# Patient Record
Sex: FEMALE | Race: WHITE | NOT HISPANIC OR LATINO | Employment: OTHER | URBAN - NONMETROPOLITAN AREA
[De-identification: names, ages, dates, MRNs, and addresses within clinical notes are randomized per-mention and may not be internally consistent; named-entity substitution may affect disease eponyms.]

---

## 2017-04-09 ENCOUNTER — APPOINTMENT (OUTPATIENT)
Dept: GENERAL RADIOLOGY | Facility: HOSPITAL | Age: 47
End: 2017-04-09

## 2017-04-09 ENCOUNTER — HOSPITAL ENCOUNTER (EMERGENCY)
Facility: HOSPITAL | Age: 47
Discharge: HOME | End: 2017-04-09
Attending: EMERGENCY MEDICINE | Admitting: EMERGENCY MEDICINE

## 2017-04-09 VITALS
BODY MASS INDEX: 39.08 KG/M2 | SYSTOLIC BLOOD PRESSURE: 124 MMHG | DIASTOLIC BLOOD PRESSURE: 83 MMHG | RESPIRATION RATE: 16 BRPM | HEART RATE: 83 BPM | WEIGHT: 249 LBS | OXYGEN SATURATION: 99 % | HEIGHT: 67 IN | TEMPERATURE: 97.9 F

## 2017-04-09 DIAGNOSIS — S93.402A SPRAIN OF LEFT ANKLE, UNSPECIFIED LIGAMENT, INITIAL ENCOUNTER: Primary | ICD-10-CM

## 2017-04-09 PROCEDURE — 73610 X-RAY EXAM OF ANKLE: CPT

## 2017-04-09 PROCEDURE — 73610 X-RAY EXAM OF ANKLE: CPT | Performed by: RADIOLOGY

## 2017-04-09 PROCEDURE — 25010000002 KETOROLAC TROMETHAMINE PER 15 MG: Performed by: PHYSICIAN ASSISTANT

## 2017-04-09 PROCEDURE — 99283 EMERGENCY DEPT VISIT LOW MDM: CPT

## 2017-04-09 PROCEDURE — 96372 THER/PROPH/DIAG INJ SC/IM: CPT

## 2017-04-09 RX ORDER — IBUPROFEN 600 MG/1
600 TABLET ORAL EVERY 8 HOURS PRN
Qty: 20 TABLET | Refills: 0 | Status: SHIPPED | OUTPATIENT
Start: 2017-04-09 | End: 2019-03-15

## 2017-04-09 RX ORDER — KETOROLAC TROMETHAMINE 30 MG/ML
60 INJECTION, SOLUTION INTRAMUSCULAR; INTRAVENOUS ONCE
Status: COMPLETED | OUTPATIENT
Start: 2017-04-09 | End: 2017-04-09

## 2017-04-09 RX ADMIN — KETOROLAC TROMETHAMINE 60 MG: 60 INJECTION, SOLUTION INTRAMUSCULAR at 16:07

## 2017-04-09 NOTE — ED PROVIDER NOTES
Subjective   Patient is a 46 y.o. female presenting with lower extremity pain.   History provided by:  Patient  Lower Extremity Issue   Location:  Ankle and foot  Time since incident:  30 minutes  Injury: yes    Mechanism of injury: fall    Fall:     Fall occurred:  Down stairs  Ankle location:  L ankle  Foot location:  L foot and sole of L foot  Pain details:     Quality:  Aching and throbbing    Radiates to:  Does not radiate    Severity:  Moderate    Onset quality:  Sudden    Duration:  1 hour    Timing:  Constant    Progression:  Worsening  Chronicity:  New  Dislocation: no    Foreign body present:  No foreign bodies  Prior injury to area:  Yes  Relieved by:  Nothing  Ineffective treatments:  None tried  Associated symptoms: decreased ROM and swelling    Associated symptoms: no fever    Risk factors: obesity        Review of Systems   Constitutional: Negative.  Negative for fever.   HENT: Negative.    Respiratory: Negative.    Cardiovascular: Negative.  Negative for chest pain.   Gastrointestinal: Negative.  Negative for abdominal pain.   Endocrine: Negative.    Genitourinary: Negative.  Negative for dysuria.   Skin: Negative.    Neurological: Negative.    Psychiatric/Behavioral: Negative.    All other systems reviewed and are negative.      History reviewed. No pertinent past medical history.    Allergies   Allergen Reactions   • Penicillins        Past Surgical History:   Procedure Laterality Date   • BLADDER SURGERY     •  SECTION     • CHOLECYSTECTOMY     • TUBAL ABDOMINAL LIGATION         History reviewed. No pertinent family history.    Social History     Social History   • Marital status:      Spouse name: N/A   • Number of children: N/A   • Years of education: N/A     Social History Main Topics   • Smoking status: Current Every Day Smoker     Packs/day: 0.50     Types: Cigarettes   • Smokeless tobacco: None   • Alcohol use None   • Drug use: None   • Sexual activity: Not Asked     Other  Topics Concern   • None     Social History Narrative   • None           Objective   Physical Exam   Constitutional: She is oriented to person, place, and time. She appears well-developed and well-nourished. No distress.   HENT:   Head: Normocephalic and atraumatic.   Right Ear: External ear normal.   Left Ear: External ear normal.   Nose: Nose normal.   Eyes: Conjunctivae and EOM are normal. Pupils are equal, round, and reactive to light.   Neck: Normal range of motion. Neck supple. No JVD present. No tracheal deviation present.   Cardiovascular: Normal rate, regular rhythm and normal heart sounds.    No murmur heard.  Pulmonary/Chest: Effort normal and breath sounds normal. No respiratory distress. She has no wheezes.   Abdominal: Soft. Bowel sounds are normal. There is no tenderness.   Musculoskeletal: She exhibits edema and tenderness. She exhibits no deformity.   Decreased ROM, swelling, and tenderness noted to L. Ankle.  Pain noted to planter side of left foot.    Neurological: She is alert and oriented to person, place, and time. No cranial nerve deficit.   Skin: Skin is warm and dry. No rash noted. She is not diaphoretic. No erythema. No pallor.   Psychiatric: She has a normal mood and affect. Her behavior is normal. Thought content normal.   Nursing note and vitals reviewed.      Procedures         ED Course  ED Course   Comment By Time   XR reviewed by Dr. Alberts:  Negative for acute bony abnormality. KATHERINE Daugherty 04/09 1700                  MDM  Number of Diagnoses or Management Options  Sprain of left ankle, unspecified ligament, initial encounter: minor     Amount and/or Complexity of Data Reviewed  Tests in the radiology section of CPT®: ordered and reviewed    Risk of Complications, Morbidity, and/or Mortality  Presenting problems: low  Diagnostic procedures: low  Management options: low    Patient Progress  Patient progress: stable      Final diagnoses:   Sprain of left ankle, unspecified  ligament, initial encounter            KATHERINE Daugherty  04/09/17 6916

## 2017-04-26 ENCOUNTER — OFFICE VISIT (OUTPATIENT)
Dept: ORTHOPEDIC SURGERY | Facility: CLINIC | Age: 47
End: 2017-04-26

## 2017-04-26 VITALS — HEIGHT: 67 IN | BODY MASS INDEX: 39.24 KG/M2 | WEIGHT: 250 LBS

## 2017-04-26 DIAGNOSIS — S93.402A SPRAIN OF LEFT ANKLE, UNSPECIFIED LIGAMENT, INITIAL ENCOUNTER: Primary | ICD-10-CM

## 2017-04-26 PROBLEM — M25.572 ACUTE LEFT ANKLE PAIN: Status: ACTIVE | Noted: 2017-04-26

## 2017-04-26 PROBLEM — R15.2 DEFECATION URGENCY: Status: ACTIVE | Noted: 2017-04-26

## 2017-04-26 PROCEDURE — 99203 OFFICE O/P NEW LOW 30 MIN: CPT | Performed by: PHYSICIAN ASSISTANT

## 2017-04-26 RX ORDER — FLUOXETINE 10 MG/1
CAPSULE ORAL
COMMUNITY
Start: 2017-04-20 | End: 2019-08-06

## 2017-04-26 RX ORDER — ERGOCALCIFEROL 1.25 MG/1
CAPSULE ORAL
COMMUNITY
Start: 2017-04-20 | End: 2019-03-15

## 2017-04-26 NOTE — PROGRESS NOTES
New Patient Visit        Patient: Briseyda Edouard  YOB: 1970  Date of encounter: 2017      History of Present Illness:   Briseyda Edouard is a 46 y.o. female who is referred here today by Celine MURPHY for evaluation of left ankle pain.  She states on 2017 she was coming down the steps of her porch when she stepped into a hole.  She states when she did that she rolled her ankle and heard a loud pop.  She states she had pain and swelling in the ankle and extended up into the leg.  She states she had increased pain with weightbearing and was unable to bear any weight on that for the first several weeks.  She still continued to have pain with weightbearing and walks with a limp because of this.  She's been icing this, elevating and using ibuprofen with minimal improvement.  She's localizes the majority of the pain along both the lateral and medial aspects of her ankle.    PMH:   Patient Active Problem List   Diagnosis   • Defecation urgency   • Vitamin D deficiency   • Anxiety and depression   • Acute left ankle pain     Past Medical History:   Diagnosis Date   • Anxiety and depression    • Vitamin D deficiency        PSH:  Past Surgical History:   Procedure Laterality Date   • BLADDER SURGERY     •  SECTION     • CHOLECYSTECTOMY     • TONSILLECTOMY AND ADENOIDECTOMY     • TUBAL ABDOMINAL LIGATION         Allergies:     Allergies   Allergen Reactions   • Penicillins        Medications:     Current Outpatient Prescriptions:   •  FLUoxetine (PROzac) 10 MG capsule, , Disp: , Rfl:   •  ibuprofen (ADVIL,MOTRIN) 600 MG tablet, Take 1 tablet by mouth Every 8 (Eight) Hours As Needed for Mild Pain (1-3)., Disp: 20 tablet, Rfl: 0  •  vitamin D (ERGOCALCIFEROL) 96683 UNITS capsule capsule, , Disp: , Rfl:     Social History:  Social History     Social History   • Marital status:      Spouse name: N/A   • Number of children: N/A   • Years of education: N/A     Occupational History   • Not on  "file.     Social History Main Topics   • Smoking status: Current Every Day Smoker     Packs/day: 0.50     Years: 30.00     Types: Cigarettes   • Smokeless tobacco: Never Used   • Alcohol use No   • Drug use: No   • Sexual activity: Not on file     Other Topics Concern   • Not on file     Social History Narrative       Family History:     Family History   Problem Relation Age of Onset   • Cancer Mother    • Diabetes Father        Review of Systems:   Review of Systems   Constitutional: Negative.    HENT: Negative.    Eyes: Negative.    Respiratory: Negative.    Cardiovascular: Negative.    Gastrointestinal: Negative.    Genitourinary: Positive for frequency and hematuria.   Musculoskeletal:        Pertinent positives in HPI   Neurological: Negative.    Hematological: Negative.    Psychiatric/Behavioral: Positive for dysphoric mood.       Physical Exam: 46 y.o. female  General Appearance:    Alert and oriented x 3, cooperative, in no acute distress                   Vitals:    04/26/17 0822   Weight: 250 lb (113 kg)   Height: 67\" (170.2 cm)                Musculoskeletal:Examination of the left ankle reveals moderate swelling with ecchymosis.  She has tenderness laterally along the lateral ligament complex and medially along the posterior tibial tendon.  She has good motion.  There is no gross instability.  Her neurovascular status is intact.    Radiology:     3 views of the left ankle were reviewed revealing no acute fractures or dislocations.    Assessment    ICD-10-CM ICD-9-CM   1. Sprain of left ankle, unspecified ligament, initial encounter S93.402A 845.00       Plan:  A 46-year-old female with acute injury sustained about 3 weeks ago.  X-rays of the ankle were reviewed revealing no acute fractures.  She has a grade 2-3 ankle sprain.  We will treat conservatively.  Today a immobilizer and provided her with an equalizer boot.  She is to wear the boot with all weightbearing activities.  She can remove it at night " and for bathing purposes.  She'll return back in 4 weeks for reevaluation.  X-rays are not necessary at her follow-up    Dasha MURPHY

## 2017-05-03 ENCOUNTER — OFFICE VISIT (OUTPATIENT)
Dept: UROLOGY | Facility: CLINIC | Age: 47
End: 2017-05-03

## 2017-05-03 VITALS
HEIGHT: 67 IN | WEIGHT: 250 LBS | DIASTOLIC BLOOD PRESSURE: 83 MMHG | SYSTOLIC BLOOD PRESSURE: 130 MMHG | BODY MASS INDEX: 39.24 KG/M2 | HEART RATE: 75 BPM

## 2017-05-03 DIAGNOSIS — R31.0 GROSS HEMATURIA: ICD-10-CM

## 2017-05-03 DIAGNOSIS — N39.0 URINARY TRACT INFECTION, SITE UNSPECIFIED: Primary | ICD-10-CM

## 2017-05-03 LAB
ANION GAP SERPL CALCULATED.3IONS-SCNC: 3 MMOL/L (ref 3.6–11.2)
BILIRUB BLD-MCNC: NEGATIVE MG/DL
BUN BLD-MCNC: 7 MG/DL (ref 7–21)
BUN/CREAT SERPL: 8.2 (ref 7–25)
CALCIUM SPEC-SCNC: 9.5 MG/DL (ref 7.7–10)
CHLORIDE SERPL-SCNC: 113 MMOL/L (ref 99–112)
CLARITY, POC: CLEAR
CO2 SERPL-SCNC: 26 MMOL/L (ref 24.3–31.9)
COLOR UR: YELLOW
CREAT BLD-MCNC: 0.85 MG/DL (ref 0.43–1.29)
GFR SERPL CREATININE-BSD FRML MDRD: 73 ML/MIN/1.73
GLUCOSE BLD-MCNC: 76 MG/DL (ref 70–110)
GLUCOSE UR STRIP-MCNC: NEGATIVE MG/DL
KETONES UR QL: NEGATIVE
LEUKOCYTE EST, POC: NEGATIVE
NITRITE UR-MCNC: NEGATIVE MG/ML
OSMOLALITY SERPL CALC.SUM OF ELEC: 279.8 MOSM/KG (ref 273–305)
PH UR: 5.5 [PH] (ref 5–8)
POTASSIUM BLD-SCNC: 3.8 MMOL/L (ref 3.5–5.3)
PROT UR STRIP-MCNC: NEGATIVE MG/DL
RBC # UR STRIP: ABNORMAL /UL
SODIUM BLD-SCNC: 142 MMOL/L (ref 135–153)
SP GR UR: 1.02 (ref 1–1.03)
UROBILINOGEN UR QL: NORMAL

## 2017-05-03 PROCEDURE — 99214 OFFICE O/P EST MOD 30 MIN: CPT | Performed by: NURSE PRACTITIONER

## 2017-05-03 PROCEDURE — 81003 URINALYSIS AUTO W/O SCOPE: CPT | Performed by: NURSE PRACTITIONER

## 2017-05-03 PROCEDURE — 80048 BASIC METABOLIC PNL TOTAL CA: CPT | Performed by: NURSE PRACTITIONER

## 2017-05-25 ENCOUNTER — OFFICE VISIT (OUTPATIENT)
Dept: ORTHOPEDIC SURGERY | Facility: CLINIC | Age: 47
End: 2017-05-25

## 2017-05-25 VITALS — BODY MASS INDEX: 39.24 KG/M2 | HEIGHT: 67 IN | WEIGHT: 250 LBS

## 2017-05-25 DIAGNOSIS — S93.402D SPRAIN OF LEFT ANKLE, UNSPECIFIED LIGAMENT, SUBSEQUENT ENCOUNTER: Primary | ICD-10-CM

## 2017-05-25 PROCEDURE — 99212 OFFICE O/P EST SF 10 MIN: CPT | Performed by: PHYSICIAN ASSISTANT

## 2017-06-13 ENCOUNTER — HOSPITAL ENCOUNTER (OUTPATIENT)
Dept: CT IMAGING | Facility: HOSPITAL | Age: 47
Discharge: HOME | End: 2017-06-13
Admitting: NURSE PRACTITIONER

## 2017-06-13 ENCOUNTER — PROCEDURE VISIT (OUTPATIENT)
Dept: UROLOGY | Facility: CLINIC | Age: 47
End: 2017-06-13

## 2017-06-13 VITALS — BODY MASS INDEX: 39.08 KG/M2 | HEIGHT: 67 IN | WEIGHT: 249 LBS

## 2017-06-13 DIAGNOSIS — R31.0 GROSS HEMATURIA: Primary | ICD-10-CM

## 2017-06-13 DIAGNOSIS — N39.0 URINARY TRACT INFECTION, SITE UNSPECIFIED: ICD-10-CM

## 2017-06-13 DIAGNOSIS — R31.0 GROSS HEMATURIA: ICD-10-CM

## 2017-06-13 LAB
BILIRUB BLD-MCNC: NEGATIVE MG/DL
CLARITY, POC: CLEAR
COLOR UR: YELLOW
GLUCOSE UR STRIP-MCNC: NEGATIVE MG/DL
KETONES UR QL: NEGATIVE
LEUKOCYTE EST, POC: NEGATIVE
NITRITE UR-MCNC: NEGATIVE MG/ML
PH UR: 6 [PH] (ref 5–8)
PROT UR STRIP-MCNC: NEGATIVE MG/DL
RBC # UR STRIP: ABNORMAL /UL
SP GR UR: 1.01 (ref 1–1.03)
UROBILINOGEN UR QL: NORMAL

## 2017-06-13 PROCEDURE — 0 IOPAMIDOL 61 % SOLUTION: Performed by: NURSE PRACTITIONER

## 2017-06-13 PROCEDURE — 52000 CYSTOURETHROSCOPY: CPT | Performed by: UROLOGY

## 2017-06-13 PROCEDURE — 81003 URINALYSIS AUTO W/O SCOPE: CPT | Performed by: UROLOGY

## 2017-06-13 PROCEDURE — 74178 CT ABD&PLV WO CNTR FLWD CNTR: CPT

## 2017-06-13 PROCEDURE — 74178 CT ABD&PLV WO CNTR FLWD CNTR: CPT | Performed by: RADIOLOGY

## 2017-06-13 RX ADMIN — IOPAMIDOL 100 ML: 612 INJECTION, SOLUTION INTRAVENOUS at 08:19

## 2017-06-13 NOTE — PROGRESS NOTES
Chief Complaint:       Chief Complaint   Patient presents with   • Cystoscopy     For episodes of gross hematuria and left flank pain           HPI:     Pt has had intermittent hematuria 2 months ago.  Pt had a ct scan that did not show any  pathology.   She did have bilateral ovarian cysts and a heterogeneous uterus.  HPI        PMI:      Past Medical History:   Diagnosis Date   • Anxiety and depression    • Cancer     MELANOMA   • Vitamin D deficiency            Medications:        Current Outpatient Prescriptions:   •  Cyanocobalamin (VITAMIN DEFICIENCY SYSTEM-B12) 1000 MCG/ML kit, Inject  as directed., Disp: , Rfl:   •  FLUoxetine (PROzac) 10 MG capsule, , Disp: , Rfl:   •  ibuprofen (ADVIL,MOTRIN) 600 MG tablet, Take 1 tablet by mouth Every 8 (Eight) Hours As Needed for Mild Pain (1-3)., Disp: 20 tablet, Rfl: 0  •  vitamin D (ERGOCALCIFEROL) 87710 UNITS capsule capsule, , Disp: , Rfl:   No current facility-administered medications for this visit.         Allergies:      Allergies   Allergen Reactions   • Penicillins Anaphylaxis           Past Surgical Histroy:      Past Surgical History:   Procedure Laterality Date   • BLADDER SURGERY     •  SECTION     • CHOLECYSTECTOMY     • TONSILLECTOMY AND ADENOIDECTOMY     • TUBAL ABDOMINAL LIGATION             Social History:      Social History     Social History   • Marital status:      Spouse name: N/A   • Number of children: N/A   • Years of education: N/A     Occupational History   • Not on file.     Social History Main Topics   • Smoking status: Current Every Day Smoker     Packs/day: 0.50     Years: 30.00     Types: Cigarettes   • Smokeless tobacco: Never Used   • Alcohol use No   • Drug use: No   • Sexual activity: Not on file     Other Topics Concern   • Not on file     Social History Narrative           Family History:      Family History   Problem Relation Age of Onset   • Cancer Mother      renal cell carconma   • Diabetes Father               Physical Exam:      Physical Exam        Procedure:      Procedure: Cystoscopy Female    Indication: hematuria    Urinalysis Performed Today:  Negative for Infection    Premedication:none    Informed Consent Obtained    Sterile prep performed in usual fashion    6 cc of topical lidocaine inserted urethrally    Flexible cystoscope inserted and bladder examined    Findings: normal: Urethra without lesions, Bladder mucosa without tumors or lesions, No stones seen, ureteral orifices are in orthotopic position and size.    Additional Procedure with Cystoscopy: none    Discussion:      Pt should follow up with her gynecologist regarding the GYN findings of the ct scan and she can return to see us in 1 year and check ua at that time.    Counseling was given to patient for the following topics diagnostic results. and the interim medical history and current results were reviewed.  A treatment plan with follow-up was made. Total time of the encounter was 11 minutes and 11 minutes were spent discussing Gross hematuria [R31.0] face-to-face.      This document has been electronically signed by Bud Nicholas MD June 13, 2017 10:39 AM

## 2017-07-06 ENCOUNTER — OFFICE VISIT (OUTPATIENT)
Dept: ORTHOPEDIC SURGERY | Facility: CLINIC | Age: 47
End: 2017-07-06

## 2017-07-06 VITALS — WEIGHT: 249 LBS | BODY MASS INDEX: 39.08 KG/M2 | HEIGHT: 67 IN

## 2017-07-06 DIAGNOSIS — S93.402D SPRAIN OF LEFT ANKLE, UNSPECIFIED LIGAMENT, SUBSEQUENT ENCOUNTER: Primary | ICD-10-CM

## 2017-07-06 PROCEDURE — 99212 OFFICE O/P EST SF 10 MIN: CPT | Performed by: PHYSICIAN ASSISTANT

## 2017-07-06 NOTE — PROGRESS NOTES
Briseyda Edouard   :1970    Date of encounter:2017        HPI:  Briseyda Edouard is a 46 y.o. female who returns here today for follow-up of a grade 3 left ankle sprain.  This now been approximately 3 months since her initial injury.  She states she no longer has to wear the ankle lace up.  She states pain is continuing to improve.  She states she still has some mild swelling at the end of the day but overall is doing well without significant complaints.      Exam:   Examination of the left ankle reveals no significant swelling or ecchymosis.  She has mild tenderness along the lateral ligament complex.  She has full range of motion without instability.  Her neurovascular status is intact.    Assessment    ICD-10-CM ICD-9-CM   1. Sprain of left ankle, unspecified ligament, subsequent encounter S93.402D 845.00       Plan:   A 46-year-old female with a grade 3 left ankle sprain.  Today she is doing well without further complaints.  She has no instability on examination.  She is released to all activities as tolerated and will return back on an as-needed basis with any further difficulties.    Dasha Alcantar PAC    Cc Celine MURPHY

## 2018-08-07 ENCOUNTER — OFFICE VISIT (OUTPATIENT)
Dept: UROLOGY | Facility: CLINIC | Age: 48
End: 2018-08-07

## 2018-08-07 VITALS — WEIGHT: 249 LBS | BODY MASS INDEX: 39.08 KG/M2 | HEIGHT: 67 IN

## 2018-08-07 DIAGNOSIS — R31.29 MICROHEMATURIA: Primary | ICD-10-CM

## 2018-08-07 LAB
BILIRUB BLD-MCNC: NEGATIVE MG/DL
CLARITY, POC: CLEAR
COLOR UR: YELLOW
GLUCOSE UR STRIP-MCNC: NEGATIVE MG/DL
KETONES UR QL: NEGATIVE
LEUKOCYTE EST, POC: NEGATIVE
NITRITE UR-MCNC: NEGATIVE MG/ML
PH UR: 5.5 [PH] (ref 5–8)
PROT UR STRIP-MCNC: NEGATIVE MG/DL
RBC # UR STRIP: ABNORMAL /UL
SP GR UR: 1.02 (ref 1–1.03)
UROBILINOGEN UR QL: NORMAL

## 2018-08-07 PROCEDURE — 99214 OFFICE O/P EST MOD 30 MIN: CPT | Performed by: UROLOGY

## 2018-08-07 PROCEDURE — 99406 BEHAV CHNG SMOKING 3-10 MIN: CPT | Performed by: UROLOGY

## 2018-08-07 NOTE — PROGRESS NOTES
Chief Complaint:          Chief Complaint   Patient presents with   • Urinary Tract Infection       HPI:   47 y.o. female.  47-year-old white female previously followed with Lexi Washburn always has visible blood had 3 urinary tract infections last year.  Mother is in hospice secondary to renal cell carcinoma she had the diagnosis in the 70's she never had chemotherapy she has a known large hiatal hernia.  She had a negative cystoscopy in 2017 negative CT and they have 2017 she's had mesh placed and mesh removed.  She has a InterStim stimulator this never worked in causing significant dysfunction she has severe urinary incontinence.  She is a  3 para 3 her urine today was 3+ positive for blood I'm going to get a renal ultrasound and follow-up with her based on this she'll need continued follow-up because the gross hematuria we also discussed removing her stimulator unfortunately this is not something I can do here because I have no certification I offered to refer her to the [facility]    Past Medical History:        Past Medical History:   Diagnosis Date   • Anxiety and depression    • Cancer (CMS/HCC)     MELANOMA   • Vitamin D deficiency          Current Meds:     Current Outpatient Prescriptions   Medication Sig Dispense Refill   • Cyanocobalamin (VITAMIN DEFICIENCY SYSTEM-B12) 1000 MCG/ML kit Inject  as directed.     • FLUoxetine (PROzac) 10 MG capsule      • ibuprofen (ADVIL,MOTRIN) 600 MG tablet Take 1 tablet by mouth Every 8 (Eight) Hours As Needed for Mild Pain (1-3). 20 tablet 0   • vitamin D (ERGOCALCIFEROL) 18036 UNITS capsule capsule        No current facility-administered medications for this visit.         Allergies:      Allergies   Allergen Reactions   • Penicillins Anaphylaxis        Past Surgical History:     Past Surgical History:   Procedure Laterality Date   • BLADDER SURGERY     •  SECTION     • CHOLECYSTECTOMY     • TONSILLECTOMY AND ADENOIDECTOMY     • TUBAL  ABDOMINAL LIGATION           Social History:     Social History     Social History   • Marital status:      Spouse name: N/A   • Number of children: N/A   • Years of education: N/A     Occupational History   • Not on file.     Social History Main Topics   • Smoking status: Current Every Day Smoker     Packs/day: 0.50     Years: 30.00     Types: Cigarettes   • Smokeless tobacco: Never Used   • Alcohol use No   • Drug use: No   • Sexual activity: Not on file     Other Topics Concern   • Not on file     Social History Narrative   • No narrative on file       Family History:     Family History   Problem Relation Age of Onset   • Cancer Mother         renal cell carconma   • Diabetes Father        Review of Systems:     Review of Systems   Constitutional: Negative.  Negative for activity change, appetite change, chills, diaphoresis, fatigue and unexpected weight change.   HENT: Negative for congestion, dental problem, drooling, ear discharge, ear pain, facial swelling, hearing loss, mouth sores, nosebleeds, postnasal drip, rhinorrhea, sinus pressure, sneezing, sore throat, tinnitus, trouble swallowing and voice change.    Eyes: Negative.  Negative for photophobia, pain, discharge, redness, itching and visual disturbance.   Respiratory: Negative.  Negative for apnea, cough, choking, chest tightness, shortness of breath, wheezing and stridor.    Cardiovascular: Negative.  Negative for chest pain, palpitations and leg swelling.   Gastrointestinal: Negative.  Negative for abdominal distention, abdominal pain, anal bleeding, blood in stool, constipation, diarrhea, nausea, rectal pain and vomiting.   Endocrine: Negative.  Negative for cold intolerance, heat intolerance, polydipsia, polyphagia and polyuria.   Genitourinary: Positive for hematuria.   Musculoskeletal: Negative.  Negative for arthralgias, back pain, gait problem, joint swelling, myalgias, neck pain and neck stiffness.   Skin: Negative.  Negative for color  change, pallor, rash and wound.   Allergic/Immunologic: Negative.  Negative for environmental allergies, food allergies and immunocompromised state.   Neurological: Negative.  Negative for dizziness, tremors, seizures, syncope, facial asymmetry, speech difficulty, weakness, light-headedness, numbness and headaches.   Hematological: Negative.  Negative for adenopathy. Does not bruise/bleed easily.   Psychiatric/Behavioral: Negative for agitation, behavioral problems, confusion, decreased concentration, dysphoric mood, hallucinations, self-injury, sleep disturbance and suicidal ideas. The patient is not nervous/anxious and is not hyperactive.    All other systems reviewed and are negative.      Physical Exam:     Physical Exam   Constitutional: She appears well-developed and well-nourished.   HENT:   Head: Normocephalic and atraumatic.   Right Ear: External ear normal.   Left Ear: External ear normal.   Mouth/Throat: Oropharynx is clear and moist.   Eyes: Pupils are equal, round, and reactive to light. Conjunctivae are normal.   Cardiovascular: Normal rate, regular rhythm, normal heart sounds and intact distal pulses.    Pulmonary/Chest: Effort normal and breath sounds normal.   Abdominal: Soft. Bowel sounds are normal. She exhibits no distension and no mass. There is no tenderness. There is no rebound and no guarding.   Genitourinary: No vaginal discharge found.   Genitourinary Comments: Right buttocks InterStim causing significant discomfort   Musculoskeletal: Normal range of motion.   Neurological: She is alert. She has normal reflexes.   Skin: Skin is warm and dry.   Psychiatric: She has a normal mood and affect. Her behavior is normal. Judgment and thought content normal.       I have reviewed the following portions of the patient's history: allergies, current medications, past family history, past medical history, past social history, past surgical history, problem list and ROS and confirm it's  accurate.      Procedure:       Assessment/Plan:   Hematuria-patient was diagnosed with hematuria.  We discussed the significance of microscopic hematuria versus gross hematuria.  We discussed the presence or absence of the type of clotting identified including vermiform clots consistent with ureteral bleeding versus just pink tinged urine versus shannan clots.  We discussed the presence of urokinase in the urine which causes the clots to dissolve with time.  We discussed the fact that it takes only a very small amount of blood in the urine to make the urine very red appearing and therefore give one the impression that there is much more blood loss that is really present.  He discussed the use of both an upper and lower tract investigation.  I discussed the fact that an upper tract investigation includes a normal renal ultrasound with a significant risks of missing more subtle lesions.  Progressing to a CT scan without contrast and finally the CT scan with contrast being the gold standard to diagnose the small neoplasms.  We discussed the lower tract investigation consisting of a cystoscopy in many of the cases where the upper tract study is negative.  Also discussed the fact that if there is a contraindication to the use of contrast we would do a noncontrasted study and this also has a chance of missing small lesions.  The specific instance would be cases of diabetes and chronic renal insufficiency.  Discussed the fact that there is about a 96% chance of a negative workup with episodes of microscopic hematuria and with much greater in the face of gross hematuria.  We discussed the fact that this is a non-cumulative test.  In other words if there is hematuria next year I would recommend continuing to work up the condition because of the fact that neoplasms may be small at the first workup and easily are missed.  I discussed the differential diagnosis of hematuria including trauma, neoplasia, infection, etc.  We  discussed the fact that if there is any history of chronic kidney disease or risk factors such as diabetes for contrast a noncontrasted study will be utilized.  We will initiate an investigation.  She previously had a CT and cystoscopy this year we'll do an ultrasound  Urinary incontinence:  Patient was diagnosed with urinary incontinence.  We discussed treatable and non-treatable causes of both stress and urge urinary incontinence.  With regards to stress urinary incontinence we discussed its relationship to childbirth and pelvic health.  We discussed the grading of stress incontinence with trying to quantitate the number of pads used.  We talked about leaking urine with laughing, lifting, coughing, and sexual intercourse.  Talked about the urge component and the concept of mixed incontinence where upon the stress treatable at the urge may exist and that 50% of the time the urge will resolve with treatment of the stress incontinence.  We talked with the diagnostic workup including a postvoid residual urine, urine and even a simple cystometrogram.  I discussed the findings that may be neurologically related including commonly seen with multiple sclerosis, Parkinson's disease, and stroke.  I talked about the various therapeutic options including anticholinergics, beta 3 agonists, and alpha blockade if there is a component of obstruction.  I discussed the side effects of anti-cholinergic including dry mouth, double vision etc.     Patient's Body mass index is 38.99 kg/m². BMI is above normal parameters. Recommendations include: educational material.    I advised Briseyda of the risks of continuing to use tobacco, and I provided her with tobacco cessation educational materials in the After Visit Summary.     During this visit, I spent 3-10 minutes counseling the patient regarding tobacco cessation.        This document has been electronically signed by ANKUSH LEVY MD August 7, 2018 8:58 AM

## 2018-08-09 PROBLEM — R31.29 MICROHEMATURIA: Status: ACTIVE | Noted: 2018-08-09

## 2018-08-10 ENCOUNTER — HOSPITAL ENCOUNTER (OUTPATIENT)
Dept: ULTRASOUND IMAGING | Facility: HOSPITAL | Age: 48
Discharge: HOME | End: 2018-08-10
Attending: UROLOGY | Admitting: UROLOGY

## 2018-08-10 DIAGNOSIS — R31.29 MICROHEMATURIA: ICD-10-CM

## 2018-08-10 PROCEDURE — 76775 US EXAM ABDO BACK WALL LIM: CPT

## 2018-08-10 PROCEDURE — 76775 US EXAM ABDO BACK WALL LIM: CPT | Performed by: RADIOLOGY

## 2018-09-28 ENCOUNTER — TRANSCRIBE ORDERS (OUTPATIENT)
Dept: ADMINISTRATIVE | Facility: HOSPITAL | Age: 48
End: 2018-09-28

## 2018-09-28 DIAGNOSIS — J32.0 CHRONIC MAXILLARY SINUSITIS: Primary | ICD-10-CM

## 2018-10-01 ENCOUNTER — HOSPITAL ENCOUNTER (OUTPATIENT)
Dept: CT IMAGING | Facility: HOSPITAL | Age: 48
Discharge: HOME | End: 2018-10-01
Admitting: OTOLARYNGOLOGY

## 2018-10-01 DIAGNOSIS — J32.0 CHRONIC MAXILLARY SINUSITIS: ICD-10-CM

## 2018-10-01 PROCEDURE — 70486 CT MAXILLOFACIAL W/O DYE: CPT | Performed by: RADIOLOGY

## 2018-10-01 PROCEDURE — 70486 CT MAXILLOFACIAL W/O DYE: CPT

## 2019-03-15 ENCOUNTER — APPOINTMENT (OUTPATIENT)
Dept: CT IMAGING | Facility: HOSPITAL | Age: 49
End: 2019-03-15

## 2019-03-15 ENCOUNTER — HOSPITAL ENCOUNTER (EMERGENCY)
Facility: HOSPITAL | Age: 49
Discharge: HOME | End: 2019-03-15
Attending: EMERGENCY MEDICINE | Admitting: EMERGENCY MEDICINE

## 2019-03-15 VITALS
BODY MASS INDEX: 39.24 KG/M2 | DIASTOLIC BLOOD PRESSURE: 92 MMHG | SYSTOLIC BLOOD PRESSURE: 137 MMHG | RESPIRATION RATE: 18 BRPM | HEIGHT: 67 IN | OXYGEN SATURATION: 95 % | HEART RATE: 87 BPM | WEIGHT: 250 LBS | TEMPERATURE: 99.7 F

## 2019-03-15 DIAGNOSIS — S16.1XXA STRAIN OF NECK MUSCLE, INITIAL ENCOUNTER: ICD-10-CM

## 2019-03-15 DIAGNOSIS — N30.01 ACUTE CYSTITIS WITH HEMATURIA: Primary | ICD-10-CM

## 2019-03-15 DIAGNOSIS — S39.012A STRAIN OF LUMBAR REGION, INITIAL ENCOUNTER: ICD-10-CM

## 2019-03-15 DIAGNOSIS — J01.00 ACUTE NON-RECURRENT MAXILLARY SINUSITIS: ICD-10-CM

## 2019-03-15 LAB
ALBUMIN SERPL-MCNC: 4.09 G/DL (ref 3.5–5.2)
ALBUMIN/GLOB SERPL: 1.6 G/DL
ALP SERPL-CCNC: 101 U/L (ref 39–117)
ALT SERPL W P-5'-P-CCNC: 7 U/L (ref 1–33)
ANION GAP SERPL CALCULATED.3IONS-SCNC: 12.5 MMOL/L
AST SERPL-CCNC: 19 U/L (ref 1–32)
B-HCG UR QL: NEGATIVE
BACTERIA UR QL AUTO: ABNORMAL /HPF
BASOPHILS # BLD AUTO: 0.05 10*3/MM3 (ref 0–0.2)
BASOPHILS NFR BLD AUTO: 0.6 % (ref 0–1.5)
BILIRUB SERPL-MCNC: 0.2 MG/DL (ref 0.1–1.2)
BILIRUB UR QL STRIP: NEGATIVE
BUN BLD-MCNC: 5 MG/DL (ref 6–20)
BUN/CREAT SERPL: 5.7 (ref 7–25)
CALCIUM SPEC-SCNC: 8.8 MG/DL (ref 8.6–10.5)
CHLORIDE SERPL-SCNC: 106 MMOL/L (ref 98–107)
CLARITY UR: CLEAR
CO2 SERPL-SCNC: 20.5 MMOL/L (ref 22–29)
COLOR UR: YELLOW
CREAT BLD-MCNC: 0.88 MG/DL (ref 0.57–1)
CRP SERPL-MCNC: 1.01 MG/DL (ref 0–0.5)
DEPRECATED RDW RBC AUTO: 41.9 FL (ref 37–54)
EOSINOPHIL # BLD AUTO: 0.3 10*3/MM3 (ref 0–0.4)
EOSINOPHIL NFR BLD AUTO: 3.7 % (ref 0.3–6.2)
ERYTHROCYTE [DISTWIDTH] IN BLOOD BY AUTOMATED COUNT: 13 % (ref 12.3–15.4)
ERYTHROCYTE [SEDIMENTATION RATE] IN BLOOD: 10 MM/HR (ref 0–20)
FLUAV AG NPH QL: NEGATIVE
FLUBV AG NPH QL IA: NEGATIVE
GFR SERPL CREATININE-BSD FRML MDRD: 69 ML/MIN/1.73
GLOBULIN UR ELPH-MCNC: 2.6 GM/DL
GLUCOSE BLD-MCNC: 98 MG/DL (ref 65–99)
GLUCOSE UR STRIP-MCNC: NEGATIVE MG/DL
HCT VFR BLD AUTO: 43.4 % (ref 34–46.6)
HGB BLD-MCNC: 14.1 G/DL (ref 12–15.9)
HGB UR QL STRIP.AUTO: ABNORMAL
HYALINE CASTS UR QL AUTO: ABNORMAL /LPF
IMM GRANULOCYTES # BLD AUTO: 0.05 10*3/MM3 (ref 0–0.05)
IMM GRANULOCYTES NFR BLD AUTO: 0.6 % (ref 0–0.5)
KETONES UR QL STRIP: NEGATIVE
LEUKOCYTE ESTERASE UR QL STRIP.AUTO: ABNORMAL
LYMPHOCYTES # BLD AUTO: 1.14 10*3/MM3 (ref 0.7–3.1)
LYMPHOCYTES NFR BLD AUTO: 14.2 % (ref 19.6–45.3)
MCH RBC QN AUTO: 29 PG (ref 26.6–33)
MCHC RBC AUTO-ENTMCNC: 32.5 G/DL (ref 31.5–35.7)
MCV RBC AUTO: 89.1 FL (ref 79–97)
MONOCYTES # BLD AUTO: 1.13 10*3/MM3 (ref 0.1–0.9)
MONOCYTES NFR BLD AUTO: 14.1 % (ref 5–12)
NEUTROPHILS # BLD AUTO: 5.37 10*3/MM3 (ref 1.4–7)
NEUTROPHILS NFR BLD AUTO: 66.8 % (ref 42.7–76)
NITRITE UR QL STRIP: NEGATIVE
PH UR STRIP.AUTO: 6 [PH] (ref 5–8)
PLATELET # BLD AUTO: 251 10*3/MM3 (ref 140–450)
PMV BLD AUTO: 10.8 FL (ref 6–12)
POTASSIUM BLD-SCNC: 3.6 MMOL/L (ref 3.5–5.2)
PROT SERPL-MCNC: 6.7 G/DL (ref 6–8.5)
PROT UR QL STRIP: NEGATIVE
RBC # BLD AUTO: 4.87 10*6/MM3 (ref 3.77–5.28)
RBC # UR: ABNORMAL /HPF
REF LAB TEST METHOD: ABNORMAL
S PYO AG THROAT QL: NEGATIVE
SODIUM BLD-SCNC: 139 MMOL/L (ref 136–145)
SP GR UR STRIP: 1.01 (ref 1–1.03)
SQUAMOUS #/AREA URNS HPF: ABNORMAL /HPF
UROBILINOGEN UR QL STRIP: ABNORMAL
WBC NRBC COR # BLD: 8.04 10*3/MM3 (ref 3.4–10.8)
WBC UR QL AUTO: ABNORMAL /HPF

## 2019-03-15 PROCEDURE — 72128 CT CHEST SPINE W/O DYE: CPT

## 2019-03-15 PROCEDURE — 25010000002 KETOROLAC TROMETHAMINE PER 15 MG: Performed by: PHYSICIAN ASSISTANT

## 2019-03-15 PROCEDURE — 87880 STREP A ASSAY W/OPTIC: CPT | Performed by: PHYSICIAN ASSISTANT

## 2019-03-15 PROCEDURE — 72131 CT LUMBAR SPINE W/O DYE: CPT

## 2019-03-15 PROCEDURE — 85025 COMPLETE CBC W/AUTO DIFF WBC: CPT | Performed by: PHYSICIAN ASSISTANT

## 2019-03-15 PROCEDURE — 72131 CT LUMBAR SPINE W/O DYE: CPT | Performed by: RADIOLOGY

## 2019-03-15 PROCEDURE — 72128 CT CHEST SPINE W/O DYE: CPT | Performed by: RADIOLOGY

## 2019-03-15 PROCEDURE — 87081 CULTURE SCREEN ONLY: CPT | Performed by: PHYSICIAN ASSISTANT

## 2019-03-15 PROCEDURE — 96372 THER/PROPH/DIAG INJ SC/IM: CPT

## 2019-03-15 PROCEDURE — 85652 RBC SED RATE AUTOMATED: CPT | Performed by: PHYSICIAN ASSISTANT

## 2019-03-15 PROCEDURE — 25010000002 CEFTRIAXONE PER 250 MG: Performed by: PHYSICIAN ASSISTANT

## 2019-03-15 PROCEDURE — 81025 URINE PREGNANCY TEST: CPT | Performed by: PHYSICIAN ASSISTANT

## 2019-03-15 PROCEDURE — 87804 INFLUENZA ASSAY W/OPTIC: CPT | Performed by: PHYSICIAN ASSISTANT

## 2019-03-15 PROCEDURE — 80053 COMPREHEN METABOLIC PANEL: CPT | Performed by: PHYSICIAN ASSISTANT

## 2019-03-15 PROCEDURE — 25010000002 METHYLPREDNISOLONE PER 40 MG: Performed by: PHYSICIAN ASSISTANT

## 2019-03-15 PROCEDURE — 86140 C-REACTIVE PROTEIN: CPT | Performed by: PHYSICIAN ASSISTANT

## 2019-03-15 PROCEDURE — 72125 CT NECK SPINE W/O DYE: CPT

## 2019-03-15 PROCEDURE — 99284 EMERGENCY DEPT VISIT MOD MDM: CPT

## 2019-03-15 PROCEDURE — 81001 URINALYSIS AUTO W/SCOPE: CPT | Performed by: PHYSICIAN ASSISTANT

## 2019-03-15 PROCEDURE — 72125 CT NECK SPINE W/O DYE: CPT | Performed by: RADIOLOGY

## 2019-03-15 PROCEDURE — 36415 COLL VENOUS BLD VENIPUNCTURE: CPT

## 2019-03-15 RX ORDER — CEFTRIAXONE 1 G/1
1000 INJECTION, POWDER, FOR SOLUTION INTRAMUSCULAR; INTRAVENOUS ONCE
Status: COMPLETED | OUTPATIENT
Start: 2019-03-15 | End: 2019-03-15

## 2019-03-15 RX ORDER — KETOROLAC TROMETHAMINE 30 MG/ML
60 INJECTION, SOLUTION INTRAMUSCULAR; INTRAVENOUS ONCE
Status: COMPLETED | OUTPATIENT
Start: 2019-03-15 | End: 2019-03-15

## 2019-03-15 RX ORDER — METHYLPREDNISOLONE SODIUM SUCCINATE 40 MG/ML
80 INJECTION, POWDER, LYOPHILIZED, FOR SOLUTION INTRAMUSCULAR; INTRAVENOUS ONCE
Status: COMPLETED | OUTPATIENT
Start: 2019-03-15 | End: 2019-03-15

## 2019-03-15 RX ORDER — ORPHENADRINE CITRATE 30 MG/ML
60 INJECTION INTRAMUSCULAR; INTRAVENOUS ONCE
Status: DISCONTINUED | OUTPATIENT
Start: 2019-03-15 | End: 2019-03-15

## 2019-03-15 RX ORDER — CYCLOBENZAPRINE HCL 10 MG
10 TABLET ORAL 3 TIMES DAILY PRN
Qty: 21 TABLET | Refills: 0 | Status: SHIPPED | OUTPATIENT
Start: 2019-03-15 | End: 2019-03-22

## 2019-03-15 RX ORDER — NAPROXEN 500 MG/1
500 TABLET ORAL 2 TIMES DAILY WITH MEALS
Qty: 14 TABLET | Refills: 0 | Status: SHIPPED | OUTPATIENT
Start: 2019-03-15 | End: 2019-03-22

## 2019-03-15 RX ORDER — LIDOCAINE HYDROCHLORIDE 10 MG/ML
2.1 INJECTION, SOLUTION EPIDURAL; INFILTRATION; INTRACAUDAL; PERINEURAL ONCE
Status: COMPLETED | OUTPATIENT
Start: 2019-03-15 | End: 2019-03-15

## 2019-03-15 RX ORDER — CEPHALEXIN 500 MG/1
500 CAPSULE ORAL 3 TIMES DAILY
Qty: 21 CAPSULE | Refills: 0 | Status: SHIPPED | OUTPATIENT
Start: 2019-03-15 | End: 2019-03-22

## 2019-03-15 RX ORDER — CYCLOBENZAPRINE HCL 10 MG
10 TABLET ORAL ONCE
Status: COMPLETED | OUTPATIENT
Start: 2019-03-15 | End: 2019-03-15

## 2019-03-15 RX ADMIN — CYCLOBENZAPRINE 10 MG: 10 TABLET, FILM COATED ORAL at 16:50

## 2019-03-15 RX ADMIN — LIDOCAINE HYDROCHLORIDE 2.1 ML: 10 INJECTION, SOLUTION EPIDURAL; INFILTRATION; INTRACAUDAL; PERINEURAL at 18:58

## 2019-03-15 RX ADMIN — METHYLPREDNISOLONE SODIUM SUCCINATE 80 MG: 40 INJECTION, POWDER, FOR SOLUTION INTRAMUSCULAR; INTRAVENOUS at 18:59

## 2019-03-15 RX ADMIN — CEFTRIAXONE 1000 MG: 1 INJECTION, POWDER, FOR SOLUTION INTRAMUSCULAR; INTRAVENOUS at 18:58

## 2019-03-15 RX ADMIN — KETOROLAC TROMETHAMINE 60 MG: 60 INJECTION, SOLUTION INTRAMUSCULAR at 16:48

## 2019-03-15 NOTE — DISCHARGE INSTRUCTIONS
Please complete your antibiotics and use flexeril and naproxen as needed for pain relief. Please see your PCP in 2 days or return to ER if symptoms worsen.

## 2019-03-15 NOTE — ED PROVIDER NOTES
Subjective   This is a 48 year old female patient who presents to the ER with 2 separate issues. The first issue is back pain. Patient has had intermittent neck, tspine and lspine pain that started 3 days ago. This pain has worsened and become constant since earlier this morning. The pain is aching in quality and radiates into the posterior aspect of the bilateral lower extremities. She denies numbness, tingling, bowel and bladder dysfunction. No history of injury.    Second issue patient presents for is cough, sore throat, headache and chills. She has no sick contacts at this time.             Review of Systems   Constitutional: Negative.  Negative for fever.   HENT: Positive for congestion, sinus pain and sore throat. Negative for dental problem, drooling, ear discharge, ear pain, facial swelling, hearing loss, mouth sores, nosebleeds, postnasal drip, rhinorrhea, sinus pressure, sneezing, tinnitus, trouble swallowing and voice change.    Respiratory: Negative.    Cardiovascular: Negative.  Negative for chest pain.   Gastrointestinal: Negative.  Negative for abdominal pain.   Endocrine: Negative.    Genitourinary: Negative.  Negative for dysuria.   Musculoskeletal: Positive for back pain and neck pain. Negative for arthralgias, gait problem, joint swelling, myalgias and neck stiffness.   Skin: Negative.    Neurological: Negative.    Psychiatric/Behavioral: Negative.    All other systems reviewed and are negative.      Past Medical History:   Diagnosis Date   • Anxiety and depression    • Cancer (CMS/HCC)     MELANOMA   • Vitamin D deficiency        Allergies   Allergen Reactions   • Penicillins Anaphylaxis       Past Surgical History:   Procedure Laterality Date   • BLADDER SURGERY     •  SECTION     • CHOLECYSTECTOMY     • TONSILLECTOMY AND ADENOIDECTOMY     • TUBAL ABDOMINAL LIGATION         Family History   Problem Relation Age of Onset   • Cancer Mother         renal cell carconma   • Diabetes Father         Social History     Socioeconomic History   • Marital status:      Spouse name: Not on file   • Number of children: Not on file   • Years of education: Not on file   • Highest education level: Not on file   Tobacco Use   • Smoking status: Current Every Day Smoker     Packs/day: 0.50     Years: 30.00     Pack years: 15.00     Types: Cigarettes   • Smokeless tobacco: Never Used   Substance and Sexual Activity   • Alcohol use: No   • Drug use: No           Objective   Physical Exam   Constitutional: She is oriented to person, place, and time. She appears well-developed and well-nourished. No distress.   HENT:   Head: Normocephalic and atraumatic.   Right Ear: External ear normal.   Left Ear: External ear normal.   Nose: Nose normal.   Mouth/Throat: Oropharynx is clear and moist. No oropharyngeal exudate.   Eyes: Conjunctivae and EOM are normal. Pupils are equal, round, and reactive to light.   Neck: Normal range of motion. Neck supple. No JVD present. No tracheal deviation present.   Cardiovascular: Normal rate, regular rhythm and normal heart sounds. Exam reveals no gallop and no friction rub.   No murmur heard.  Pulmonary/Chest: Effort normal and breath sounds normal. No stridor. No respiratory distress. She has no wheezes. She has no rales. She exhibits no tenderness.   Abdominal: Soft. Bowel sounds are normal. She exhibits no distension and no mass. There is no tenderness. There is no rebound and no guarding. No hernia.   Musculoskeletal: Normal range of motion. She exhibits tenderness. She exhibits no edema or deformity.   Tenderness to palpation in the paraspinal muscles of the cspine, tspine and lspine. Full ROM BUE and BLE. Neurovascular status and sensation BUE and BLE intact.    Neurological: She is alert and oriented to person, place, and time. No cranial nerve deficit.   Skin: Skin is warm and dry. No rash noted. She is not diaphoretic. No erythema. No pallor.   Psychiatric: She has a normal  mood and affect. Her behavior is normal. Thought content normal.   Nursing note and vitals reviewed.      Procedures           ED Course  ED Course as of Mar 15 1903   Fri Mar 15, 2019   1823 Spondylosis without stenosis; no evidence for acute abnormality per [facility] report.  CT Cervical Spine Without Contrast [MM]   1827 Mild spondylosis without stenosis per [facility] report.  CT Thoracic Spine Without Contrast [MM]   1830 Spondylosis without stenosis per [facility] report.  CT Lumbar Spine Without Contrast [MM]   1834 Patient diagnosed with UTI, acute sinusitis, and neck and low back strains. Will be d/c home with rx for flexeril, naproxen and keflex. Will see PCP in 2 days or return to ER if symptoms worsen.   [MM]      ED Course User Index  [MM] Paty Stern PA                  MDM  Number of Diagnoses or Management Options  Acute cystitis with hematuria:   Acute non-recurrent maxillary sinusitis:   Strain of lumbar region, initial encounter:   Strain of neck muscle, initial encounter:      Amount and/or Complexity of Data Reviewed  Clinical lab tests: ordered and reviewed  Tests in the radiology section of CPT®: ordered and reviewed  Discuss the patient with other providers: yes          Final diagnoses:   Acute cystitis with hematuria   Acute non-recurrent maxillary sinusitis   Strain of lumbar region, initial encounter   Strain of neck muscle, initial encounter            Paty Stern PA  03/15/19 1904

## 2019-03-17 LAB — BACTERIA SPEC AEROBE CULT: NORMAL

## 2019-05-16 ENCOUNTER — TRANSCRIBE ORDERS (OUTPATIENT)
Dept: ADMINISTRATIVE | Facility: HOSPITAL | Age: 49
End: 2019-05-16

## 2019-05-16 DIAGNOSIS — N92.1 METRORRHAGIA: Primary | ICD-10-CM

## 2019-05-20 ENCOUNTER — HOSPITAL ENCOUNTER (OUTPATIENT)
Dept: ULTRASOUND IMAGING | Facility: HOSPITAL | Age: 49
Discharge: HOME | End: 2019-05-20
Admitting: PHYSICIAN ASSISTANT

## 2019-05-20 DIAGNOSIS — N92.1 METRORRHAGIA: ICD-10-CM

## 2019-05-20 PROCEDURE — 76856 US EXAM PELVIC COMPLETE: CPT | Performed by: RADIOLOGY

## 2019-05-20 PROCEDURE — 76856 US EXAM PELVIC COMPLETE: CPT

## 2019-06-04 DIAGNOSIS — M25.562 PAIN IN BOTH KNEES, UNSPECIFIED CHRONICITY: Primary | ICD-10-CM

## 2019-06-04 DIAGNOSIS — M25.561 PAIN IN BOTH KNEES, UNSPECIFIED CHRONICITY: Primary | ICD-10-CM

## 2019-06-05 ENCOUNTER — APPOINTMENT (OUTPATIENT)
Dept: GENERAL RADIOLOGY | Facility: HOSPITAL | Age: 49
End: 2019-06-05

## 2019-08-06 ENCOUNTER — OFFICE VISIT (OUTPATIENT)
Dept: UROLOGY | Facility: CLINIC | Age: 49
End: 2019-08-06

## 2019-08-06 VITALS
WEIGHT: 242.6 LBS | TEMPERATURE: 97.9 F | HEIGHT: 67 IN | DIASTOLIC BLOOD PRESSURE: 90 MMHG | SYSTOLIC BLOOD PRESSURE: 140 MMHG | BODY MASS INDEX: 38.08 KG/M2

## 2019-08-06 DIAGNOSIS — R31.29 MICROHEMATURIA: Primary | ICD-10-CM

## 2019-08-06 DIAGNOSIS — R31.0 GROSS HEMATURIA: ICD-10-CM

## 2019-08-06 LAB
BILIRUB BLD-MCNC: NEGATIVE MG/DL
CLARITY, POC: CLEAR
COLOR UR: YELLOW
GLUCOSE UR STRIP-MCNC: NEGATIVE MG/DL
KETONES UR QL: NEGATIVE
LEUKOCYTE EST, POC: ABNORMAL
NITRITE UR-MCNC: NEGATIVE MG/ML
PH UR: 6 [PH] (ref 5–8)
PROT UR STRIP-MCNC: NEGATIVE MG/DL
RBC # UR STRIP: ABNORMAL /UL
SP GR UR: 1.01 (ref 1–1.03)
UROBILINOGEN UR QL: ABNORMAL

## 2019-08-06 PROCEDURE — 87086 URINE CULTURE/COLONY COUNT: CPT | Performed by: NURSE PRACTITIONER

## 2019-08-06 PROCEDURE — 99213 OFFICE O/P EST LOW 20 MIN: CPT | Performed by: UROLOGY

## 2019-08-06 RX ORDER — IBUPROFEN 800 MG/1
TABLET ORAL
COMMUNITY
Start: 2019-06-19 | End: 2021-06-15

## 2019-08-06 RX ORDER — ERGOCALCIFEROL 1.25 MG/1
50000 CAPSULE ORAL
COMMUNITY
Start: 2019-05-23

## 2019-08-06 RX ORDER — METHOCARBAMOL 750 MG/1
TABLET, FILM COATED ORAL
COMMUNITY
Start: 2019-06-19 | End: 2020-08-21

## 2019-08-06 RX ORDER — MEDROXYPROGESTERONE ACETATE 10 MG/1
10 TABLET ORAL TAKE AS DIRECTED
COMMUNITY
Start: 2019-06-27 | End: 2020-08-21

## 2019-08-06 RX ORDER — FLUOXETINE HYDROCHLORIDE 20 MG/1
60 CAPSULE ORAL DAILY
COMMUNITY
Start: 2019-06-27

## 2019-08-06 RX ORDER — BUSPIRONE HYDROCHLORIDE 5 MG/1
5 TABLET ORAL 3 TIMES DAILY
COMMUNITY
Start: 2019-06-27 | End: 2021-02-22 | Stop reason: DRUGHIGH

## 2019-08-06 NOTE — PROGRESS NOTES
Chief Complaint:          Chief Complaint   Patient presents with   • microhematuria       HPI:   48 y.o. female  previously followed with Lexi Washburn always has visible blood had 3 urinary tract infections last year.  Mother is in hospice secondary to renal cell carcinoma she had the diagnosis in the 70's she never had chemotherapy she has a known large hiatal hernia.  She had a negative cystoscopy in 2017 negative CT and they have 2017 she's had mesh placed and mesh removed.  She has a InterStim stimulator this never worked in causing significant dysfunction she has severe urinary incontinence.  She is a  3 para 3 her urine today was 3+ positive for blood I'm going to get a renal ultrasound and follow-up with her based on this she'll need continued follow-up because the gross hematuria we also discussed removing her stimulator unfortunately this is not something I can do here because I have no certification I offered to refer her to the [facility].  She returns today in follow-up of microscopic hematuria today urine was positive for blood she did have an episode of gross hematuria she saw 2 months ago in the emergency room but was not admitted she had fevers chills no nausea no vomiting no diarrhea but weakness in bilateral low back pain I am in a repeat a CT because of the episode of gross painless hematuria      Past Medical History:        Past Medical History:   Diagnosis Date   • Anxiety and depression    • Cancer (CMS/HCC)     MELANOMA   • Vitamin D deficiency          Current Meds:     Current Outpatient Medications   Medication Sig Dispense Refill   • FLUoxetine (PROzac) 10 MG capsule        No current facility-administered medications for this visit.         Allergies:      Allergies   Allergen Reactions   • Penicillins Anaphylaxis        Past Surgical History:     Past Surgical History:   Procedure Laterality Date   • BLADDER SURGERY     •  SECTION     • CHOLECYSTECTOMY      • TONSILLECTOMY AND ADENOIDECTOMY     • TUBAL ABDOMINAL LIGATION           Social History:     Social History     Socioeconomic History   • Marital status:      Spouse name: Not on file   • Number of children: Not on file   • Years of education: Not on file   • Highest education level: Not on file   Tobacco Use   • Smoking status: Current Every Day Smoker     Packs/day: 0.50     Years: 30.00     Pack years: 15.00     Types: Cigarettes   • Smokeless tobacco: Never Used   Substance and Sexual Activity   • Alcohol use: No   • Drug use: No       Family History:     Family History   Problem Relation Age of Onset   • Cancer Mother         renal cell carconma   • Diabetes Father        Review of Systems:     Review of Systems   Constitutional: Negative.  Negative for activity change, appetite change, chills, diaphoresis, fatigue and unexpected weight change.   HENT: Negative for congestion, dental problem, drooling, ear discharge, ear pain, facial swelling, hearing loss, mouth sores, nosebleeds, postnasal drip, rhinorrhea, sinus pressure, sneezing, sore throat, tinnitus, trouble swallowing and voice change.    Eyes: Negative.  Negative for photophobia, pain, discharge, redness, itching and visual disturbance.   Respiratory: Negative.  Negative for apnea, cough, choking, chest tightness, shortness of breath, wheezing and stridor.    Cardiovascular: Negative.  Negative for chest pain, palpitations and leg swelling.   Gastrointestinal: Negative.  Negative for abdominal distention, abdominal pain, anal bleeding, blood in stool, constipation, diarrhea, nausea, rectal pain and vomiting.   Endocrine: Negative.  Negative for cold intolerance, heat intolerance, polydipsia, polyphagia and polyuria.   Genitourinary: Positive for hematuria.   Musculoskeletal: Negative.  Negative for arthralgias, back pain, gait problem, joint swelling, myalgias, neck pain and neck stiffness.   Skin: Negative.  Negative for color change,  pallor, rash and wound.   Allergic/Immunologic: Negative.  Negative for environmental allergies, food allergies and immunocompromised state.   Neurological: Negative.  Negative for dizziness, tremors, seizures, syncope, facial asymmetry, speech difficulty, weakness, light-headedness, numbness and headaches.   Hematological: Negative.  Negative for adenopathy. Does not bruise/bleed easily.   Psychiatric/Behavioral: Negative for agitation, behavioral problems, confusion, decreased concentration, dysphoric mood, hallucinations, self-injury, sleep disturbance and suicidal ideas. The patient is not nervous/anxious and is not hyperactive.    All other systems reviewed and are negative.      Physical Exam:     Physical Exam   Constitutional: She appears well-developed and well-nourished.   HENT:   Head: Normocephalic and atraumatic.   Right Ear: External ear normal.   Left Ear: External ear normal.   Mouth/Throat: Oropharynx is clear and moist.   Eyes: Conjunctivae are normal. Pupils are equal, round, and reactive to light.   Cardiovascular: Normal rate, regular rhythm, normal heart sounds and intact distal pulses.   Pulmonary/Chest: Effort normal and breath sounds normal.   Abdominal: Soft. Bowel sounds are normal. She exhibits no distension and no mass. There is no tenderness. There is no rebound and no guarding.   Genitourinary: No vaginal discharge found.   Musculoskeletal: Normal range of motion.   Neurological: She is alert. She has normal reflexes.   Skin: Skin is warm and dry.   Psychiatric: She has a normal mood and affect. Her behavior is normal. Judgment and thought content normal.       I have reviewed the following portions of the patient's history: allergies, current medications, past family history, past medical history, past social history, past surgical history, problem list and ROS and confirm it's accurate.      Procedure:       Assessment/Plan:   Hematuria-patient was diagnosed with hematuria.  We  discussed the significance of microscopic hematuria versus gross hematuria.  We discussed the presence or absence of the type of clotting identified including vermiform clots consistent with ureteral bleeding versus just pink tinged urine versus shannan clots.  We discussed the presence of urokinase in the urine which causes the clots to dissolve with time.  We discussed the fact that it takes only a very small amount of blood in the urine to make the urine very red appearing and therefore give one the impression that there is much more blood loss that is really present.  He discussed the use of both an upper and lower tract investigation.  I discussed the fact that an upper tract investigation includes a normal renal ultrasound with a significant risks of missing more subtle lesions.  Progressing to a CT scan without contrast and finally the CT scan with contrast being the gold standard to diagnose the small neoplasms.  We discussed the lower tract investigation consisting of a cystoscopy in many of the cases where the upper tract study is negative.  Also discussed the fact that if there is a contraindication to the use of contrast we would do a noncontrasted study and this also has a chance of missing small lesions.  The specific instance would be cases of diabetes and chronic renal insufficiency.  Discussed the fact that there is about a 96% chance of a negative workup with episodes of microscopic hematuria and with much greater in the face of gross hematuria.  We discussed the fact that this is a non-cumulative test.  In other words if there is hematuria next year I would recommend continuing to work up the condition because of the fact that neoplasms may be small at the first workup and easily are missed.  I discussed the differential diagnosis of hematuria including trauma, neoplasia, infection, etc.  We discussed the fact that if there is any history of chronic kidney disease or risk factors such as diabetes  for contrast a noncontrasted study will be utilized.  We will initiate an investigation.  For CT in view of the episode of gross painless hematuria            Patient's There is no height or weight on file to calculate BMI. BMI is above normal parameters. Recommendations include: educational material.              This document has been electronically signed by ANKUSH LEVY MD August 6, 2019 9:37 AM

## 2019-08-07 LAB — BACTERIA SPEC AEROBE CULT: NORMAL

## 2019-08-08 PROBLEM — R31.0 GROSS HEMATURIA: Status: ACTIVE | Noted: 2019-08-08

## 2019-08-16 ENCOUNTER — HOSPITAL ENCOUNTER (OUTPATIENT)
Dept: CT IMAGING | Facility: HOSPITAL | Age: 49
Discharge: HOME | End: 2019-08-16
Admitting: UROLOGY

## 2019-08-16 DIAGNOSIS — R31.29 MICROHEMATURIA: ICD-10-CM

## 2019-08-16 PROCEDURE — 74176 CT ABD & PELVIS W/O CONTRAST: CPT | Performed by: RADIOLOGY

## 2019-08-16 PROCEDURE — 74176 CT ABD & PELVIS W/O CONTRAST: CPT

## 2019-08-22 ENCOUNTER — OFFICE VISIT (OUTPATIENT)
Dept: UROLOGY | Facility: CLINIC | Age: 49
End: 2019-08-22

## 2019-08-22 VITALS
BODY MASS INDEX: 39.12 KG/M2 | SYSTOLIC BLOOD PRESSURE: 138 MMHG | WEIGHT: 249.8 LBS | DIASTOLIC BLOOD PRESSURE: 72 MMHG | TEMPERATURE: 97.9 F

## 2019-08-22 DIAGNOSIS — Z80.51 FAMILY HISTORY OF RENAL CELL CARCINOMA: Primary | ICD-10-CM

## 2019-08-22 DIAGNOSIS — R10.2 PELVIC PAIN IN FEMALE: ICD-10-CM

## 2019-08-22 PROCEDURE — 99213 OFFICE O/P EST LOW 20 MIN: CPT | Performed by: UROLOGY

## 2019-08-22 NOTE — PROGRESS NOTES
Chief Complaint:          Chief Complaint   Patient presents with   • Flank Pain     review CT scan. negative for any stones       HPI:   48 y.o. female previously followed with Lexi Washburn always has visible blood had 3 urinary tract infections last year.  Mother is in hospice secondary to renal cell carcinoma she had the diagnosis in the 70's she never had chemotherapy she has a known large hiatal hernia.  She had a negative cystoscopy in 2017 negative CT and they have 2017 she's had mesh placed and mesh removed.  She has a InterStim stimulator this never worked in causing significant dysfunction she has severe urinary incontinence.  She is a  3 para 3 her urine today was 3+ positive for blood I'm going to get a renal ultrasound and follow-up with her based on this she'll need continued follow-up because the gross hematuria we also discussed removing her stimulator unfortunately this is not something I can do here because I have no certification I offered to refer her to the [facility].  She returns today in follow-up of microscopic hematuria today urine was positive for blood she did have an episode of gross hematuria she saw 2 months ago in the emergency room but was not admitted she had fevers chills no nausea no vomiting no diarrhea but weakness in bilateral low back pain I am in a repeat a CT because of the episode of gross painless hematuria  She returns today I reviewed her CT scan which was a stone protocol.  She is had frequent infections.  She sees a gynecologist has her Pap smears.  She has a very significant family history of renal cell carcinoma in her mother who had a very long and painful course.  She has severe pain from the prior InterStim and I would recommend removal I will set her up with Dr. Jose Adams in [place], I will see her back in 12 months    The image depicted above shows normal upper tracts this was noncontrast CT scan.  Past Medical History:        Past  Medical History:   Diagnosis Date   • Anxiety and depression    • Cancer (CMS/HCC)     MELANOMA   • Vitamin D deficiency          Current Meds:     Current Outpatient Medications   Medication Sig Dispense Refill   • busPIRone (BUSPAR) 5 MG tablet      • FLUoxetine (PROzac) 20 MG capsule      • ibuprofen (ADVIL,MOTRIN) 800 MG tablet      • medroxyPROGESTERone (PROVERA) 10 MG tablet      • methocarbamol (ROBAXIN) 750 MG tablet      • vitamin D (ERGOCALCIFEROL) 92601 units capsule capsule        No current facility-administered medications for this visit.         Allergies:      Allergies   Allergen Reactions   • Penicillins Anaphylaxis        Past Surgical History:     Past Surgical History:   Procedure Laterality Date   • BLADDER SURGERY     •  SECTION     • CHOLECYSTECTOMY     • TONSILLECTOMY AND ADENOIDECTOMY     • TUBAL ABDOMINAL LIGATION           Social History:     Social History     Socioeconomic History   • Marital status:      Spouse name: Not on file   • Number of children: Not on file   • Years of education: Not on file   • Highest education level: Not on file   Tobacco Use   • Smoking status: Current Every Day Smoker     Packs/day: 0.50     Years: 30.00     Pack years: 15.00     Types: Cigarettes   • Smokeless tobacco: Never Used   Substance and Sexual Activity   • Alcohol use: No   • Drug use: No       Family History:     Family History   Problem Relation Age of Onset   • Cancer Mother         renal cell carconma   • Diabetes Father        Review of Systems:     Review of Systems   Constitutional: Negative.  Negative for activity change, appetite change, chills, diaphoresis, fatigue and unexpected weight change.   HENT: Negative for congestion, dental problem, drooling, ear discharge, ear pain, facial swelling, hearing loss, mouth sores, nosebleeds, postnasal drip, rhinorrhea, sinus pressure, sneezing, sore throat, tinnitus, trouble swallowing and voice change.    Eyes: Negative.  Negative  for photophobia, pain, discharge, redness, itching and visual disturbance.   Respiratory: Negative.  Negative for apnea, cough, choking, chest tightness, shortness of breath, wheezing and stridor.    Cardiovascular: Negative.  Negative for chest pain, palpitations and leg swelling.   Gastrointestinal: Negative.  Negative for abdominal distention, abdominal pain, anal bleeding, blood in stool, constipation, diarrhea, nausea, rectal pain and vomiting.   Endocrine: Negative.  Negative for cold intolerance, heat intolerance, polydipsia, polyphagia and polyuria.   Musculoskeletal: Negative.  Negative for arthralgias, back pain, gait problem, joint swelling, myalgias, neck pain and neck stiffness.   Skin: Negative.  Negative for color change, pallor, rash and wound.   Allergic/Immunologic: Negative.  Negative for environmental allergies, food allergies and immunocompromised state.   Neurological: Negative.  Negative for dizziness, tremors, seizures, syncope, facial asymmetry, speech difficulty, weakness, light-headedness, numbness and headaches.   Hematological: Negative.  Negative for adenopathy. Does not bruise/bleed easily.   Psychiatric/Behavioral: Negative for agitation, behavioral problems, confusion, decreased concentration, dysphoric mood, hallucinations, self-injury, sleep disturbance and suicidal ideas. The patient is not nervous/anxious and is not hyperactive.    All other systems reviewed and are negative.      Physical Exam:     Physical Exam   Constitutional: She appears well-developed and well-nourished.   HENT:   Head: Normocephalic and atraumatic.   Right Ear: External ear normal.   Left Ear: External ear normal.   Mouth/Throat: Oropharynx is clear and moist.   Eyes: Conjunctivae are normal. Pupils are equal, round, and reactive to light.   Cardiovascular: Normal rate, regular rhythm, normal heart sounds and intact distal pulses.   Pulmonary/Chest: Effort normal and breath sounds normal.   Abdominal:  Soft. Bowel sounds are normal. She exhibits no distension and no mass. There is no tenderness. There is no rebound and no guarding.   Genitourinary: No vaginal discharge found.   Musculoskeletal: Normal range of motion.   Neurological: She is alert. She has normal reflexes.   Skin: Skin is warm and dry.   Psychiatric: She has a normal mood and affect. Her behavior is normal. Judgment and thought content normal.       I have reviewed the following portions of the patient's history: allergies, current medications, past family history, past medical history, past social history, past surgical history, problem list and ROS and confirm it's accurate.      Procedure:       Assessment/Plan:   History of renal cell carcinoma we will continue careful vigilance  Pelvic pain-he is desirous of removal of her InterStim and I will set her up to see Dr. Jose Adams at the [facility]            Patient's Body mass index is 39.12 kg/m². BMI is above normal parameters. Recommendations include: educational material.              This document has been electronically signed by ANKUSH LEVY MD August 22, 2019 11:02 AM

## 2019-08-26 DIAGNOSIS — R10.2 PELVIC PAIN IN FEMALE: Primary | ICD-10-CM

## 2019-08-26 PROBLEM — Z80.51 FAMILY HISTORY OF RENAL CELL CARCINOMA: Status: ACTIVE | Noted: 2019-08-26

## 2019-09-05 ENCOUNTER — OFFICE VISIT (OUTPATIENT)
Dept: UROLOGY | Facility: CLINIC | Age: 49
End: 2019-09-05

## 2019-09-05 ENCOUNTER — HOSPITAL ENCOUNTER (OUTPATIENT)
Dept: GENERAL RADIOLOGY | Facility: HOSPITAL | Age: 49
Discharge: HOME | End: 2019-09-05
Admitting: UROLOGY

## 2019-09-05 VITALS
SYSTOLIC BLOOD PRESSURE: 148 MMHG | WEIGHT: 234 LBS | BODY MASS INDEX: 36.73 KG/M2 | OXYGEN SATURATION: 98 % | HEART RATE: 87 BPM | TEMPERATURE: 97.5 F | HEIGHT: 67 IN | DIASTOLIC BLOOD PRESSURE: 94 MMHG

## 2019-09-05 DIAGNOSIS — N39.46 MIXED STRESS AND URGE URINARY INCONTINENCE: ICD-10-CM

## 2019-09-05 DIAGNOSIS — R31.21 ASYMPTOMATIC MICROSCOPIC HEMATURIA: Primary | ICD-10-CM

## 2019-09-05 DIAGNOSIS — R10.2 PELVIC PAIN IN FEMALE: ICD-10-CM

## 2019-09-05 DIAGNOSIS — Z80.51 FAMILY HISTORY OF RENAL CELL CARCINOMA: ICD-10-CM

## 2019-09-05 PROCEDURE — 99214 OFFICE O/P EST MOD 30 MIN: CPT | Performed by: UROLOGY

## 2019-09-05 PROCEDURE — 72170 X-RAY EXAM OF PELVIS: CPT

## 2019-09-05 RX ORDER — FLUTICASONE PROPIONATE 50 MCG
SPRAY, SUSPENSION (ML) NASAL
COMMUNITY
Start: 2019-08-30 | End: 2020-08-21

## 2019-09-05 NOTE — PROGRESS NOTES
"Chief Complaint  ALEXEY    Referring Provider  Steven Wayne,*    HPI  Ms. Edouard is a 49 y.o. female presents with complaint of urinary incontinence for >10 years. Pt has primarily urge mixed urinary incontinence.     Severity: Pt uses 3-4 pads a day and has tried some Rx, interstim for UUI, and mid urethral sling (5 yrs ago) for STEFF in the past  Associated Sx: Pt has + h/o daytime frequency, urgency and nocturia x3.      No h/o poor stream, straining, hesitancy or incomplete voiding.     2-3 UTI, + gross hematuria in past, + nephrolithiasis in past    She had interstim placed 2008 by Dr. Reveles in [place], [place]. She thinks it has been \"dead\" for 5-6 years. She thinks it worked for 6 mo after placed.   She has never tried botox    No vaginal discharge or bleeding.  No h/o something coming out of vagina   No fever/chills  No weight loss, appetite normal.    no Constipation  2 History of vaginal deliveries    Occasional pelvic pain    Past Medical History  Past Medical History:   Diagnosis Date   • Anxiety and depression    • Cancer (CMS/[facility])     MELANOMA   • Hot flashes    • Menstrual changes    • Pelvic pain    • Vitamin D deficiency        Past Surgical History  Past Surgical History:   Procedure Laterality Date   • BLADDER SURGERY     •  SECTION     • CHOLECYSTECTOMY     • TONSILLECTOMY AND ADENOIDECTOMY     • TUBAL ABDOMINAL LIGATION         Medications  Current Outpatient Medications   Medication Sig Dispense Refill   • busPIRone (BUSPAR) 5 MG tablet      • FLUoxetine (PROzac) 20 MG capsule      • fluticasone (FLONASE) 50 MCG/ACT nasal spray      • medroxyPROGESTERone (PROVERA) 10 MG tablet      • methocarbamol (ROBAXIN) 750 MG tablet      • vitamin D (ERGOCALCIFEROL) 55287 units capsule capsule      • ibuprofen (ADVIL,MOTRIN) 800 MG tablet        No current facility-administered medications for this visit.        Allergies  Allergies   Allergen Reactions   • Penicillins Anaphylaxis   • Sulfa " "Antibiotics Hives       Social History  Social History     Socioeconomic History   • Marital status:      Spouse name: Not on file   • Number of children: Not on file   • Years of education: Not on file   • Highest education level: Not on file   Tobacco Use   • Smoking status: Current Every Day Smoker     Packs/day: 0.50     Years: 30.00     Pack years: 15.00     Types: Cigarettes   • Smokeless tobacco: Never Used   Substance and Sexual Activity   • Alcohol use: No   • Drug use: No   • Sexual activity: Defer       Family History  She has no family history of bladder CA  RCC in mother     Review of Systems  Constitutional: No fevers or chills  Skin: Negative for rash  Endocrine: No heat/cold intolerance   Cardiovascular: Negative for chest pain or dyspnea on exertion  Respiratory: Negative for shortness of breath or wheezing  Gastrointestinal: No nausea or vomiting  Genitourinary: Negative for current gross hematuria.  Musculoskeletal: No flank pain  Neurological:  Negative for frequent headaches or dizziness  Lymph/Heme: Negative for leg swelling or calf pain.    Physical Exam  Visit Vitals  /94 (BP Location: Right arm, Patient Position: Sitting, Cuff Size: Adult)   Pulse 87   Temp 97.5 °F (36.4 °C)   Ht 170.2 cm (67\")   Wt 106 kg (234 lb)   SpO2 98%   BMI 36.65 kg/m²     Constitutional: NAD, WDWN  HEENT: NCAT. Conjunctivae normal  MMM  Cardiovascular: Regular rate  Pulmonary/Chest: Respirations are even and non-labored bilaterally  Abdominal: Soft with no distension, tenderness, masses, guarding or CVA tenderness; the battery unit for her InterStim device is placed very high in her lower back, well above her belt line.   Neurological: A + O x 3,  cranial nerves II-XII grossly intact  Extremities: PARDEEP x 4, warm, no clubbing, no cyanosis  Skin: Pink, warm, dry with no rash  Psychiatric:  Normal mood and affect    Genitourinary:   deferred    Labs  Brief Urine Lab Results  (Last result in the past 365 " days)      Color   Clarity   Blood   Leuk Est   Nitrite   Protein   CREAT   Urine HCG        08/06/19 1012 Yellow Clear 3+ Trace Negative Negative               Lab Results   Component Value Date    GLUCOSE 98 03/15/2019    CALCIUM 8.8 03/15/2019     03/15/2019    K 3.6 03/15/2019    CO2 20.5 (L) 03/15/2019     03/15/2019    BUN 5 (L) 03/15/2019    CREATININE 0.88 03/15/2019    EGFRIFAFRI 61 06/22/2016    EGFRIFNONA 69 03/15/2019    BCR 5.7 (L) 03/15/2019    ANIONGAP 12.5 03/15/2019       Lab Results   Component Value Date    WBC 8.04 03/15/2019    HGB 14.1 03/15/2019    HCT 43.4 03/15/2019    MCV 89.1 03/15/2019     03/15/2019       Xr Pelvis 1 Or 2 View  Result Date: 9/5/2019  Left sacral nerve stimulator in place.  This report was finalized on 9/5/2019 3:16 PM by Ventura Tracey M.D..    Ct Abdomen Pelvis Stone Protocol  Result Date: 8/16/2019  No evidence of stone or obstruction was seen involving the collecting system of either kidney.  846.43 mGy.cm The radiation dose reduction device was utilized for each scan per the ALARA (as low as reasonably achievable) protocol.  This report was finalized on 8/16/2019 2:07 PM by Dr. Chapo Rosales II, MD.        I have personally reviewed her labs and  imaging.     Assessment  Ms. Edouard is a 49 y.o. female with mixed urinary incontinence, STEFF >UUI. She has a history of mid urethral sling as well as InterStim, which no longer functions, was placed in 2008, and the battery unit is placed too high above her belt line, and rubs against chairs.     Plan  1. Fu next week for cysto, PE, PVR; I am likely thinking cystoscopy with bulking agent for her, though we need to rule out any mesh erosion.   2.  She will think about her case and decide if she wants a new InterStim placed in a more appropriate position in the meantime versus Botox      Jose Adams MD

## 2019-09-19 ENCOUNTER — PROCEDURE VISIT (OUTPATIENT)
Dept: UROLOGY | Facility: CLINIC | Age: 49
End: 2019-09-19

## 2019-09-19 VITALS — BODY MASS INDEX: 36.73 KG/M2 | HEIGHT: 67 IN | WEIGHT: 234 LBS | RESPIRATION RATE: 18 BRPM

## 2019-09-19 DIAGNOSIS — N39.41 URGE INCONTINENCE OF URINE: Primary | ICD-10-CM

## 2019-09-19 PROCEDURE — 52000 CYSTOURETHROSCOPY: CPT | Performed by: UROLOGY

## 2019-09-19 PROCEDURE — 99213 OFFICE O/P EST LOW 20 MIN: CPT | Performed by: UROLOGY

## 2019-09-19 NOTE — PROGRESS NOTES
"Chief Complaint  ALEXEY    Referring Provider  No ref. provider found    HPI  Ms. Edouard is a 49 y.o. female presents with complaint of urinary incontinence for >10 years. Pt has primarily urge mixed urinary incontinence.     Severity: Pt uses 3-4 pads a day and has tried some Rx, interstim for UUI, and mid urethral sling (5 yrs ago) for STEFF in the past  Associated Sx: Pt has + h/o daytime frequency, urgency and nocturia x3.      No h/o poor stream, straining, hesitancy or incomplete voiding.     2-3 UTI, + gross hematuria in past, + nephrolithiasis in past    She had interstim placed 2008 by Dr. Reveles in [place], [place]. She thinks it has been \"dead\" for 5-6 years. She thinks it worked for 6 mo after placed.   She has never tried botox    No vaginal discharge or bleeding.  No h/o something coming out of vagina   No fever/chills  No weight loss, appetite normal.    no Constipation  2 History of vaginal deliveries    Occasional pelvic pain    Past Medical History  Past Medical History:   Diagnosis Date   • Anxiety and depression    • Cancer (CMS/HCC)     MELANOMA   • Hot flashes    • Menstrual changes    • Pelvic pain    • Vitamin D deficiency        Past Surgical History  Past Surgical History:   Procedure Laterality Date   • BLADDER SURGERY     •  SECTION     • CHOLECYSTECTOMY     • TONSILLECTOMY AND ADENOIDECTOMY     • TUBAL ABDOMINAL LIGATION         Medications  Current Outpatient Medications   Medication Sig Dispense Refill   • busPIRone (BUSPAR) 5 MG tablet      • FLUoxetine (PROzac) 20 MG capsule      • fluticasone (FLONASE) 50 MCG/ACT nasal spray      • ibuprofen (ADVIL,MOTRIN) 800 MG tablet      • medroxyPROGESTERone (PROVERA) 10 MG tablet      • methocarbamol (ROBAXIN) 750 MG tablet      • vitamin D (ERGOCALCIFEROL) 21896 units capsule capsule        No current facility-administered medications for this visit.        Allergies  Allergies   Allergen Reactions   • Penicillins Anaphylaxis   • Sulfa " "Antibiotics Hives       Social History  Social History     Socioeconomic History   • Marital status:      Spouse name: Not on file   • Number of children: Not on file   • Years of education: Not on file   • Highest education level: Not on file   Tobacco Use   • Smoking status: Current Every Day Smoker     Packs/day: 0.50     Years: 30.00     Pack years: 15.00     Types: Cigarettes   • Smokeless tobacco: Never Used   Substance and Sexual Activity   • Alcohol use: No   • Drug use: No   • Sexual activity: Defer       Family History  She has no family history of bladder CA  RCC in mother     Review of Systems  Constitutional: No fevers or chills  Skin: Negative for rash  Endocrine: No heat/cold intolerance   Cardiovascular: Negative for chest pain or dyspnea on exertion  Respiratory: Negative for shortness of breath or wheezing  Gastrointestinal: No nausea or vomiting  Genitourinary: Negative for current gross hematuria.  Musculoskeletal: No flank pain  Neurological:  Negative for frequent headaches or dizziness  Lymph/Heme: Negative for leg swelling or calf pain.    Physical Exam  Visit Vitals  Resp 18   Ht 170.2 cm (67.01\")   Wt 106 kg (234 lb)   BMI 36.64 kg/m²     Constitutional: NAD, WDWN  HEENT: NCAT. Conjunctivae normal  MMM  Cardiovascular: Regular rate  Pulmonary/Chest: Respirations are even and non-labored bilaterally  Abdominal: Soft with no distension, tenderness, masses, guarding or CVA tenderness; the battery unit for her InterStim device is placed very high in her lower back, well above her belt line.   Neurological: A + O x 3,  cranial nerves II-XII grossly intact  Extremities: PARDEEP x 4, warm, no clubbing, no cyanosis  Skin: Pink, warm, dry with no rash  Psychiatric:  Normal mood and affect    Genitourinary:   No real stress urinary incontinence on cough leak test    Labs  Brief Urine Lab Results  (Last result in the past 365 days)      Color   Clarity   Blood   Leuk Est   Nitrite   Protein   CREAT "   Urine HCG        08/06/19 1012 Yellow Clear 3+ Trace Negative Negative               Lab Results   Component Value Date    GLUCOSE 98 03/15/2019    CALCIUM 8.8 03/15/2019     03/15/2019    K 3.6 03/15/2019    CO2 20.5 (L) 03/15/2019     03/15/2019    BUN 5 (L) 03/15/2019    CREATININE 0.88 03/15/2019    EGFRIFAFRI 61 06/22/2016    EGFRIFNONA 69 03/15/2019    BCR 5.7 (L) 03/15/2019    ANIONGAP 12.5 03/15/2019       Lab Results   Component Value Date    WBC 8.04 03/15/2019    HGB 14.1 03/15/2019    HCT 43.4 03/15/2019    MCV 89.1 03/15/2019     03/15/2019       Xr Pelvis 1 Or 2 View  Result Date: 9/5/2019  Left sacral nerve stimulator in place.  This report was finalized on 9/5/2019 3:16 PM by Ventura Tracey M.D..    Ct Abdomen Pelvis Stone Protocol  Result Date: 8/16/2019  No evidence of stone or obstruction was seen involving the collecting system of either kidney.  846.43 mGy.cm The radiation dose reduction device was utilized for each scan per the ALARA (as low as reasonably achievable) protocol.  This report was finalized on 8/16/2019 2:07 PM by Dr. Chapo Rosales II, MD.      Preprocedure diagnosis  Urge urinary incontinence    Postprocedure diagnosis  No bladder pathology identified.  No mesh erosion identified.    Procedure  Flexible Cystourethroscopy    Attending surgeon  Jose Adams MD    Anesthesia  2% lidocaine jelly intraurethrally    Complications  None    Indications  49 y.o. female undergoing a flexible cystoscopy for the above mentioned indications.    Informed consent was obtained.      Findings  Cystoscopy revealed one right and left ureteral orifice in the normal anatomic position, normal bladder mucosa and no tumors, masses or stones.      Procedure  The patient was placed in supine position and prepped and draped in sterile fashion with lidocaine jelly per urethra for anesthesia.  A timeout was performed.  The 14F flexible cystoscope was lubricated and gently  placed through the urethra and into the bladder.  The bladder was completely visualized.  The cystoscope was retroflexed and the bladder neck visualized.  The scope was withdrawn and the procedure terminated.  The patient tolerated the procedure well.          I have personally reviewed her labs and  imaging.     Assessment  Ms. Edouard is a 49 y.o. female with mixed urinary incontinence, STEFF >UUI. She has a history of mid urethral sling as well as InterStim, which no longer functions, was placed in 2008, and the battery unit is placed too high above her belt line, and rubs against chairs.     We discussed the risks, benefits, and alternatives to InterStim removal.  I offered her replacement with a better location of the lead and the battery unit.  She politely declined.  She voiced her understanding of all the risks and wished to proceed.    Plan  1.  Schedule for removal of InterStim device, then at 2 week FU appt we will do in office botox      Jose Adams MD

## 2019-10-16 ENCOUNTER — APPOINTMENT (OUTPATIENT)
Dept: PREADMISSION TESTING | Facility: HOSPITAL | Age: 49
End: 2019-10-16

## 2019-10-16 VITALS — WEIGHT: 231 LBS | HEIGHT: 67 IN | BODY MASS INDEX: 36.26 KG/M2

## 2019-10-16 RX ORDER — ASCORBIC ACID 500 MG
500 TABLET ORAL DAILY
COMMUNITY
End: 2021-06-15

## 2019-10-23 ENCOUNTER — HOSPITAL ENCOUNTER (OUTPATIENT)
Facility: HOSPITAL | Age: 49
Setting detail: HOSPITAL OUTPATIENT SURGERY
Discharge: HOME | End: 2019-10-23
Attending: UROLOGY | Admitting: UROLOGY

## 2019-10-23 ENCOUNTER — ANESTHESIA EVENT (OUTPATIENT)
Dept: PERIOP | Facility: HOSPITAL | Age: 49
End: 2019-10-23

## 2019-10-23 ENCOUNTER — APPOINTMENT (OUTPATIENT)
Dept: GENERAL RADIOLOGY | Facility: HOSPITAL | Age: 49
End: 2019-10-23

## 2019-10-23 ENCOUNTER — ANESTHESIA (OUTPATIENT)
Dept: PERIOP | Facility: HOSPITAL | Age: 49
End: 2019-10-23

## 2019-10-23 VITALS
RESPIRATION RATE: 18 BRPM | SYSTOLIC BLOOD PRESSURE: 140 MMHG | HEART RATE: 86 BPM | TEMPERATURE: 98.2 F | DIASTOLIC BLOOD PRESSURE: 82 MMHG | OXYGEN SATURATION: 97 %

## 2019-10-23 DIAGNOSIS — N39.41 URGE INCONTINENCE OF URINE: ICD-10-CM

## 2019-10-23 LAB
B-HCG UR QL: NEGATIVE
INTERNAL NEGATIVE CONTROL: NEGATIVE
INTERNAL POSITIVE CONTROL: POSITIVE
Lab: NORMAL

## 2019-10-23 PROCEDURE — 94799 UNLISTED PULMONARY SVC/PX: CPT

## 2019-10-23 PROCEDURE — 25010000002 KETOROLAC TROMETHAMINE PER 15 MG: Performed by: NURSE ANESTHETIST, CERTIFIED REGISTERED

## 2019-10-23 PROCEDURE — 25010000002 PROPOFOL 10 MG/ML EMULSION: Performed by: NURSE ANESTHETIST, CERTIFIED REGISTERED

## 2019-10-23 PROCEDURE — 94640 AIRWAY INHALATION TREATMENT: CPT

## 2019-10-23 PROCEDURE — 71045 X-RAY EXAM CHEST 1 VIEW: CPT

## 2019-10-23 PROCEDURE — 25010000002 MIDAZOLAM PER 1 MG: Performed by: NURSE ANESTHETIST, CERTIFIED REGISTERED

## 2019-10-23 PROCEDURE — 64585 REV/RMV PERPH NSTIM ELTRD RA: CPT | Performed by: UROLOGY

## 2019-10-23 PROCEDURE — 25010000002 ONDANSETRON PER 1 MG: Performed by: NURSE ANESTHETIST, CERTIFIED REGISTERED

## 2019-10-23 PROCEDURE — 25010000002 FENTANYL CITRATE (PF) 100 MCG/2ML SOLUTION: Performed by: NURSE ANESTHETIST, CERTIFIED REGISTERED

## 2019-10-23 PROCEDURE — 25010000003 LIDOCAINE 1 % SOLUTION: Performed by: UROLOGY

## 2019-10-23 PROCEDURE — 76000 FLUOROSCOPY <1 HR PHYS/QHP: CPT

## 2019-10-23 PROCEDURE — 25010000002 DEXAMETHASONE PER 1 MG: Performed by: NURSE ANESTHETIST, CERTIFIED REGISTERED

## 2019-10-23 PROCEDURE — 81025 URINE PREGNANCY TEST: CPT | Performed by: UROLOGY

## 2019-10-23 PROCEDURE — 64595 REV/RMV PRPH SAC/GSTR NPG/R: CPT | Performed by: UROLOGY

## 2019-10-23 RX ORDER — KETAMINE HCL IN NACL, ISO-OSM 100MG/10ML
SYRINGE (ML) INJECTION AS NEEDED
Status: DISCONTINUED | OUTPATIENT
Start: 2019-10-23 | End: 2019-10-23 | Stop reason: SURG

## 2019-10-23 RX ORDER — LEVOFLOXACIN 500 MG/1
500 TABLET, FILM COATED ORAL DAILY
Qty: 5 TABLET | Refills: 0 | Status: SHIPPED | OUTPATIENT
Start: 2019-10-23 | End: 2019-10-28

## 2019-10-23 RX ORDER — ONDANSETRON 2 MG/ML
INJECTION INTRAMUSCULAR; INTRAVENOUS AS NEEDED
Status: DISCONTINUED | OUTPATIENT
Start: 2019-10-23 | End: 2019-10-23 | Stop reason: SURG

## 2019-10-23 RX ORDER — FENTANYL CITRATE 50 UG/ML
INJECTION, SOLUTION INTRAMUSCULAR; INTRAVENOUS AS NEEDED
Status: DISCONTINUED | OUTPATIENT
Start: 2019-10-23 | End: 2019-10-23 | Stop reason: SURG

## 2019-10-23 RX ORDER — OXYCODONE HYDROCHLORIDE 5 MG/1
5 TABLET ORAL EVERY 6 HOURS PRN
Qty: 5 TABLET | Refills: 0 | Status: SHIPPED | OUTPATIENT
Start: 2019-10-23 | End: 2020-08-21

## 2019-10-23 RX ORDER — ACETAMINOPHEN 325 MG/1
650 TABLET ORAL EVERY 6 HOURS
Qty: 30 TABLET | Refills: 0 | Status: SHIPPED | OUTPATIENT
Start: 2019-10-23 | End: 2019-10-26

## 2019-10-23 RX ORDER — PROMETHAZINE HYDROCHLORIDE 25 MG/1
25 TABLET ORAL ONCE AS NEEDED
Status: DISCONTINUED | OUTPATIENT
Start: 2019-10-23 | End: 2019-10-23 | Stop reason: HOSPADM

## 2019-10-23 RX ORDER — MEPERIDINE HYDROCHLORIDE 50 MG/ML
12.5 INJECTION INTRAMUSCULAR; INTRAVENOUS; SUBCUTANEOUS
Status: DISCONTINUED | OUTPATIENT
Start: 2019-10-23 | End: 2019-10-23 | Stop reason: HOSPADM

## 2019-10-23 RX ORDER — IPRATROPIUM BROMIDE AND ALBUTEROL SULFATE 2.5; .5 MG/3ML; MG/3ML
SOLUTION RESPIRATORY (INHALATION)
Status: COMPLETED
Start: 2019-10-23 | End: 2019-10-23

## 2019-10-23 RX ORDER — PROMETHAZINE HYDROCHLORIDE 25 MG/1
25 SUPPOSITORY RECTAL ONCE AS NEEDED
Status: DISCONTINUED | OUTPATIENT
Start: 2019-10-23 | End: 2019-10-23 | Stop reason: HOSPADM

## 2019-10-23 RX ORDER — SODIUM CHLORIDE, SODIUM LACTATE, POTASSIUM CHLORIDE, CALCIUM CHLORIDE 600; 310; 30; 20 MG/100ML; MG/100ML; MG/100ML; MG/100ML
1000 INJECTION, SOLUTION INTRAVENOUS CONTINUOUS
Status: DISCONTINUED | OUTPATIENT
Start: 2019-10-23 | End: 2019-10-23 | Stop reason: HOSPADM

## 2019-10-23 RX ORDER — LIDOCAINE HYDROCHLORIDE 10 MG/ML
INJECTION, SOLUTION INFILTRATION; PERINEURAL AS NEEDED
Status: DISCONTINUED | OUTPATIENT
Start: 2019-10-23 | End: 2019-10-23 | Stop reason: HOSPADM

## 2019-10-23 RX ORDER — PROMETHAZINE HYDROCHLORIDE 25 MG/ML
6.25 INJECTION, SOLUTION INTRAMUSCULAR; INTRAVENOUS ONCE AS NEEDED
Status: DISCONTINUED | OUTPATIENT
Start: 2019-10-23 | End: 2019-10-23 | Stop reason: HOSPADM

## 2019-10-23 RX ORDER — MEPERIDINE HYDROCHLORIDE 50 MG/ML
25 INJECTION INTRAMUSCULAR; INTRAVENOUS; SUBCUTANEOUS ONCE
Status: COMPLETED | OUTPATIENT
Start: 2019-10-23 | End: 2019-10-23

## 2019-10-23 RX ORDER — SODIUM CHLORIDE 0.9 % (FLUSH) 0.9 %
10 SYRINGE (ML) INJECTION AS NEEDED
Status: DISCONTINUED | OUTPATIENT
Start: 2019-10-23 | End: 2019-10-23 | Stop reason: HOSPADM

## 2019-10-23 RX ORDER — CLINDAMYCIN PHOSPHATE 900 MG/50ML
900 INJECTION, SOLUTION INTRAVENOUS ONCE
Status: COMPLETED | OUTPATIENT
Start: 2019-10-23 | End: 2019-10-23

## 2019-10-23 RX ORDER — DEXAMETHASONE SODIUM PHOSPHATE 4 MG/ML
INJECTION, SOLUTION INTRA-ARTICULAR; INTRALESIONAL; INTRAMUSCULAR; INTRAVENOUS; SOFT TISSUE AS NEEDED
Status: DISCONTINUED | OUTPATIENT
Start: 2019-10-23 | End: 2019-10-23 | Stop reason: SURG

## 2019-10-23 RX ORDER — MIDAZOLAM HYDROCHLORIDE 1 MG/ML
INJECTION INTRAMUSCULAR; INTRAVENOUS AS NEEDED
Status: DISCONTINUED | OUTPATIENT
Start: 2019-10-23 | End: 2019-10-23 | Stop reason: SURG

## 2019-10-23 RX ORDER — KETOROLAC TROMETHAMINE 30 MG/ML
INJECTION, SOLUTION INTRAMUSCULAR; INTRAVENOUS AS NEEDED
Status: DISCONTINUED | OUTPATIENT
Start: 2019-10-23 | End: 2019-10-23 | Stop reason: SURG

## 2019-10-23 RX ORDER — MEPERIDINE HYDROCHLORIDE 50 MG/ML
INJECTION INTRAMUSCULAR; INTRAVENOUS; SUBCUTANEOUS
Status: COMPLETED
Start: 2019-10-23 | End: 2019-10-23

## 2019-10-23 RX ORDER — IPRATROPIUM BROMIDE AND ALBUTEROL SULFATE 2.5; .5 MG/3ML; MG/3ML
3 SOLUTION RESPIRATORY (INHALATION) ONCE AS NEEDED
Status: COMPLETED | OUTPATIENT
Start: 2019-10-23 | End: 2019-10-23

## 2019-10-23 RX ORDER — ONDANSETRON 2 MG/ML
4 INJECTION INTRAMUSCULAR; INTRAVENOUS ONCE AS NEEDED
Status: DISCONTINUED | OUTPATIENT
Start: 2019-10-23 | End: 2019-10-23 | Stop reason: HOSPADM

## 2019-10-23 RX ORDER — LIDOCAINE HYDROCHLORIDE 20 MG/ML
INJECTION, SOLUTION INTRAVENOUS AS NEEDED
Status: DISCONTINUED | OUTPATIENT
Start: 2019-10-23 | End: 2019-10-23 | Stop reason: SURG

## 2019-10-23 RX ORDER — DOCUSATE SODIUM 100 MG/1
100 CAPSULE, LIQUID FILLED ORAL 2 TIMES DAILY
Qty: 15 CAPSULE | Refills: 1 | Status: SHIPPED | OUTPATIENT
Start: 2019-10-23 | End: 2020-08-21

## 2019-10-23 RX ADMIN — IPRATROPIUM BROMIDE AND ALBUTEROL SULFATE 3 ML: 2.5; .5 SOLUTION RESPIRATORY (INHALATION) at 10:07

## 2019-10-23 RX ADMIN — ONDANSETRON 4 MG: 2 INJECTION INTRAMUSCULAR; INTRAVENOUS at 08:48

## 2019-10-23 RX ADMIN — MEPERIDINE HYDROCHLORIDE 12.5 MG: 50 INJECTION INTRAMUSCULAR; INTRAVENOUS; SUBCUTANEOUS at 10:25

## 2019-10-23 RX ADMIN — MEPERIDINE HYDROCHLORIDE 12.5 MG: 50 INJECTION INTRAMUSCULAR; INTRAVENOUS; SUBCUTANEOUS at 10:19

## 2019-10-23 RX ADMIN — MIDAZOLAM HYDROCHLORIDE 2 MG: 1 INJECTION, SOLUTION INTRAMUSCULAR; INTRAVENOUS at 08:40

## 2019-10-23 RX ADMIN — KETOROLAC TROMETHAMINE 30 MG: 30 INJECTION, SOLUTION INTRAMUSCULAR at 08:48

## 2019-10-23 RX ADMIN — FENTANYL CITRATE 25 MCG: 50 INJECTION, SOLUTION INTRAMUSCULAR; INTRAVENOUS at 09:22

## 2019-10-23 RX ADMIN — Medication 20 MG: at 08:45

## 2019-10-23 RX ADMIN — SODIUM CHLORIDE, POTASSIUM CHLORIDE, SODIUM LACTATE AND CALCIUM CHLORIDE: 600; 310; 30; 20 INJECTION, SOLUTION INTRAVENOUS at 09:53

## 2019-10-23 RX ADMIN — CLINDAMYCIN PHOSPHATE 900 MG: 900 INJECTION, SOLUTION INTRAVENOUS at 08:48

## 2019-10-23 RX ADMIN — SODIUM CHLORIDE, POTASSIUM CHLORIDE, SODIUM LACTATE AND CALCIUM CHLORIDE 1000 ML: 600; 310; 30; 20 INJECTION, SOLUTION INTRAVENOUS at 08:35

## 2019-10-23 RX ADMIN — PROPOFOL 100 MCG/KG/MIN: 10 INJECTION, EMULSION INTRAVENOUS at 08:45

## 2019-10-23 RX ADMIN — IPRATROPIUM BROMIDE AND ALBUTEROL SULFATE 3 ML: .5; 3 SOLUTION RESPIRATORY (INHALATION) at 10:07

## 2019-10-23 RX ADMIN — DEXAMETHASONE SODIUM PHOSPHATE 4 MG: 4 INJECTION, SOLUTION INTRAMUSCULAR; INTRAVENOUS at 08:48

## 2019-10-23 RX ADMIN — FENTANYL CITRATE 25 MCG: 50 INJECTION, SOLUTION INTRAMUSCULAR; INTRAVENOUS at 09:53

## 2019-10-23 RX ADMIN — Medication 10 MG: at 09:22

## 2019-10-23 RX ADMIN — LIDOCAINE HYDROCHLORIDE 100 MG: 20 INJECTION, SOLUTION INTRAVENOUS at 09:53

## 2019-10-23 RX ADMIN — PHENOL 2 SPRAY: 1.5 LIQUID ORAL at 10:49

## 2019-10-23 RX ADMIN — FENTANYL CITRATE 50 MCG: 50 INJECTION, SOLUTION INTRAMUSCULAR; INTRAVENOUS at 08:40

## 2019-10-23 RX ADMIN — LIDOCAINE HYDROCHLORIDE 60 MG: 20 INJECTION, SOLUTION INTRAVENOUS at 08:45

## 2019-10-23 RX ADMIN — LIDOCAINE HYDROCHLORIDE: 20 SOLUTION ORAL; TOPICAL at 10:43

## 2019-10-23 NOTE — SIGNIFICANT NOTE
1125-  Celine Talamantes [facility] v/o, anesthesia to evaluate pt prior to discharge to home.

## 2019-10-23 NOTE — OP NOTE
Operative Report     DATE PERFORMED: 10/23/19     ATTENDING PHYSICIAN: Jose Adams MD    PREOPERATIVE DIAGNOSIS: Urge urinary incontinence    POSTOPERATIVE DIAGNOSIS: Same    PROCEDURE PERFORMED:   1) InterStim removal  2) fluoroscopy less than 1 hour    ANESTHESIA: MAC     BLOOD LOSS: Minimal.     SPECIMEN: None.     COMPLICATIONS: None.     INDICATION FOR PROCEDURE: Patient Briseyda Edouard is a 49 y.o.-year-old with a history of urinary retention/ Refractory urge incontinence, who underwent an InterStim procedure in a years ago and says her InterStim is not working now for her at all.  She desires for it to be removed.    DESCRIPTION OF PROCEDURE:   The patient was taken to the main operating theater and placed in the supine position. Sequential compression devices were applied to both lower extremities. The patient underwent smooth induction of MAC anesthesia. The patient received intravenous antibiotics prior to the procedure. An operative time-out was performed identifying the patient, the procedure and the surgeon. The patient was placed in the prone position, carefully with all pressure points padded. Surgical site was sterilely prepped and draped.     We began the procedure by making an incision on the previously made pocket for . An approximately 4 cm incision was revised and this was deepened with the help of Bovie electrocautery, going through the subcutaneous tissue.  The IPG was then removed and disconnected from the wire.  We made a separate counterincision over the sacrum with the assistance of fluoroscopy to localize the lead.  We dissected out and removed the tines in the the lead intact.  The deep tissue was closed in both incisions with 3-0 running Vicryl.  The patient did vomit during closure, and there was concern for aspiration.  Therefore the patient had to be flipped onto her side for the procedure could be finished.  The incisions were then cleaned with Betadine after the  patient was stabilized and the skin was closed with 3-0 running Vicryl.    The patient was transferred to PACU in stable condition. The instrument, needle and sponge count was correct x2.     She will follow-up with me in 4 weeks for Botox.

## 2019-10-23 NOTE — SIGNIFICANT NOTE
Notified [facility] RN  of v/o to have pt cleared for discharge to home from [facility].

## 2019-10-23 NOTE — DISCHARGE INSTRUCTIONS
No pushing, pulling, tugging,  heavy lifting, or strenuous activity.  No major decision making, driving, or drinking alcoholic beverages for 24 hours. ( due to the medications you have  received)  Always use good hand hygiene/washing techniques.  NO driving while taking pain medications.    To assist you in voiding:  Drink plenty of fluids  Listen to running water while attempting to void.    If you are unable to urinate and you have an uncomfortable urge to void or it has been   6 hours since you were discharged, return to the Emergency Room    * if you have an incision:  Check your incision area every day for signs of infection.   Check for:  * more redness, swelling, or pain  *more fluid or blood  *warmth  *pus or bad smell.    Follow Dr. Adams's instructions as directed.      Home Care After Interstim removal  The following instructions will help you care for yourself, or be cared for upon your return home today.  These are guidelines for your care right after surgery only.     Diet  Drink plenty of liquids and eat light meals today.    Start your regular diet tomorrow.    Activity  Start normal activities in twenty-four (24) hours.    Wound Care and Hygiene  No restrictions, start normal routine.  Keep dressing on until follow up visit.    Shower and bathing  You may shower 48 hours after surgery  You may take a bath in 2 weeks  Please avoid swimming for 2-4 weeks    Anesthesia Precautions & Expectations  After anesthesia, rest for 24 hours.  Do not drive, drink alcoholic beverages or make any important decisions during this time.  General anesthesia may cause a sore throat, jaw discomfort or muscle aches.  These symptoms can last for one or two days.     What to Expect after Surgery  Soreness over the incision.  Mild bleeding at the incision site.    Call your Doctor  Severe pain not controlled by oral medication  Temperature above 101.5 degrees  Inability to urinate within eight (8) hours after  surgery  Drainage from the incision    Other Contacts  Urology Office:  [id number]   [place], [place] [address] [phone] office  [phone] fax    Follow up Appointment  You have a follow up scheduled with Dr. Adams on 11/21/2019 10:10 AM.  We will do Botox at that time.

## 2019-10-23 NOTE — ANESTHESIA POSTPROCEDURE EVALUATION
Patient: Briseyda Edouard    Procedure Summary     Date:  10/23/19 Room / Location:  [facility] OR  /  [facility]    Anesthesia Start:  0839 Anesthesia Stop:  1001    Procedure:  INTERSTIM REMOVAL (N/A ) Diagnosis:       Urge incontinence of urine      (Urge incontinence of urine [N39.41])    Surgeon:  Jose Adams MD Provider:  Yessy Talamantes CRNA    Anesthesia Type:  MAC ASA Status:  3          Anesthesia Type: MAC  Last vitals  BP   140/82 (10/23/19 1200)   Temp   98.2 °F (36.8 °C) (10/23/19 1130)   Pulse   86 (10/23/19 1200)   Resp   18 (10/23/19 1200)     SpO2   97 % (10/23/19 1200)     Post Anesthesia Care and Evaluation    Patient location during evaluation: PHASE II  Patient participation: complete - patient participated  Level of consciousness: awake and alert  Pain score: 0  Pain management: satisfactory to patient  Airway patency: patent (pt complains of sore throat, no obvious trauma to OP )  Anesthetic complications: No anesthetic complications  PONV Status: none  Cardiovascular status: acceptable and stable  Respiratory status: acceptable and room air  Hydration status: acceptable    Comments: CXR negative for aspiration, oxygen saturation stable on RA oxygen and rhonchi upon expiration cleared with cough, discussed signs and symptoms of pneumonia and discussed results of negative findings on CXR, pt told to use chloraseptic throat spray as needed and to continue with ice water. Pt given instructions of post op care and how to get in contact with the anesthesia department. Dr. Adams aware and pt discharged to home with her daughter.

## 2019-10-23 NOTE — H&P
HPI  Briseyda Edouard is a 49 y.o. with history of refractory urge urinary incontinence, with a nonfunctioning InterStim device that she wants removed.    No recent fevers or new LUTS  Does not take any blood thinners    Past Medical History  Past Medical History:   Diagnosis Date   • Anxiety and depression    • Body piercing     both ears   • Cancer (CMS/HCC)     MELANOMA-right breast, back   • Diverticulitis    • Hiatal hernia    • History of being tatooed    • Hot flashes    • Kidney stones    • Menstrual changes    • Pelvic pain    • Pneumonia    • Urinary incontinence    • Vitamin D deficiency    • Wears glasses     for reading       Past Surgical History  Past Surgical History:   Procedure Laterality Date   • BLADDER SURGERY      sling x 2   •  SECTION      x1   • CHOLECYSTECTOMY     • COLONOSCOPY     • ENDOSCOPY     • SKIN BIOPSY     • TONSILLECTOMY AND ADENOIDECTOMY     • TUBAL ABDOMINAL LIGATION         Medications    Current Facility-Administered Medications:   •  lactated ringers infusion 1,000 mL, 1,000 mL, Intravenous, Continuous, Jose Adams MD  •  sodium chloride 0.9 % flush 10 mL, 10 mL, Intravenous, PRN, Jose Adams MD    Allergies  Allergies   Allergen Reactions   • Penicillins Anaphylaxis   • Sulfa Antibiotics Hives       Social History  Social History     Socioeconomic History   • Marital status:      Spouse name: Not on file   • Number of children: Not on file   • Years of education: Not on file   • Highest education level: Not on file   Tobacco Use   • Smoking status: Current Every Day Smoker     Packs/day: 0.50     Years: 30.00     Pack years: 15.00     Types: Cigarettes   • Smokeless tobacco: Never Used   Substance and Sexual Activity   • Alcohol use: No   • Drug use: No   • Sexual activity: Defer       Review of Systems  Constitutional: No fevers or chills  Skin: Negative for rash  Endocrine: No heat/cold intolerance   Cardiovascular: Negative for chest pain  or dyspnea on exertion  Respiratory: Negative for shortness of breath or wheezing  Gastrointestinal: No constipation, nausea or vomiting  Genitourinary: Negative for new lower urinary tract symptoms, current gross hematuria or dysuria.  Musculoskeletal: No flank pain  Neurological:  Negative for frequent headaches or dizziness  Lymph/Heme: Negative for leg swelling or calf pain.    Physical Exam  Visit Vitals  LMP 10/09/2019     Constitutional: NAD, WDWN.   HEENT: NCAT. Conjunctivae normal.  MMM.    Cardiovascular: Regular rate.  Pulmonary/Chest: Respirations are even and non-labored bilaterally.  Abdominal: Soft. No distension, tenderness, masses or guarding. No CVA tenderness.  Neurological: A + O x 3.  Cranial Nerves II-XII grossly intact. Normal gait.  Extremities: PARDEEP x 4, Warm. No clubbing.  No cyanosis.    Skin: Pink, warm and dry.  No rashes noted.  Psychiatric:  Normal mood and affect    Labs  @LASTLABOSUSHORT(SODIUM,POTASSIUM,CHLORIDE,CO2,BUN,CREATSERUM,GLUCOSE)@  @LASTLABOSUSHORT(WBC,WBCCOUNT,WBCFETAL,HGB,HCT,PLATELET,MCV)@   No results found for: PSA  No components found for: CREATSERUM      Assessment  Briseyda Edouard is a 49 y.o. female who presents with urge urinary incontinence    Plan  1. To OR for InterStim removal    Jose Adams MD

## 2019-10-23 NOTE — ANESTHESIA PREPROCEDURE EVALUATION
Anesthesia Evaluation     Patient summary reviewed and Nursing notes reviewed   no history of anesthetic complications:  NPO Solid Status: > 8 hours  NPO Liquid Status: > 8 hours           Airway   Mallampati: II  TM distance: >3 FB  Neck ROM: full  No difficulty expected  Dental - normal exam     Pulmonary - normal exam   (+) pneumonia (4-5 years ago ) resolved , a smoker Current Abstained day of surgery,   Cardiovascular - negative cardio ROS and normal exam  Exercise tolerance: good (4-7 METS)        Neuro/Psych  (+) psychiatric history Anxiety and Depression,     GI/Hepatic/Renal/Endo    (+) obesity,  hiatal hernia,  renal disease stones,     Musculoskeletal (-) negative ROS    Abdominal    Substance History - negative use     OB/GYN    (-)  Pregnant    Comment: Urine pregnancy negative       Other      history of cancer (melanoma ) remission                    Anesthesia Plan    ASA 3     MAC   (Risks and benefits discussed including risk of aspiration, recall and dental damage. All patient questions answered. Will continue with POC.)  intravenous induction   Anesthetic plan, all risks, benefits, and alternatives have been provided, discussed and informed consent has been obtained with: patient.

## 2019-11-15 ENCOUNTER — PROCEDURE VISIT (OUTPATIENT)
Dept: UROLOGY | Facility: CLINIC | Age: 49
End: 2019-11-15

## 2019-11-15 VITALS — HEIGHT: 67 IN | BODY MASS INDEX: 36.26 KG/M2 | RESPIRATION RATE: 18 BRPM | WEIGHT: 231 LBS

## 2019-11-15 DIAGNOSIS — N39.41 URGE INCONTINENCE OF URINE: ICD-10-CM

## 2019-11-15 PROCEDURE — 52287 CYSTOSCOPY CHEMODENERVATION: CPT | Performed by: UROLOGY

## 2019-11-15 PROCEDURE — 81003 URINALYSIS AUTO W/O SCOPE: CPT | Performed by: UROLOGY

## 2019-11-15 NOTE — PROGRESS NOTES
"Chief Complaint  ALEXEY    Referring Provider  No ref. provider found    HPI  Ms. Edouard is a 49 y.o. female presents with complaint of urinary incontinence for >10 years. Pt has primarily urge mixed urinary incontinence.     She is status post removal of her InterStim device.  She is healed well and has had no fevers and minimal discomfort.    For historical review,  Severity: Pt uses 3-4 pads a day and has tried some Rx, interstim for UUI, and mid urethral sling (5 yrs ago) for STEFF in the past  Associated Sx: Pt has + h/o daytime frequency, urgency and nocturia x3.      No h/o poor stream, straining, hesitancy or incomplete voiding.     2-3 UTI, + gross hematuria in past, + nephrolithiasis in past    She had interstim placed 2008 by Dr. Reveles in [place], [place]. She thinks it has been \"dead\" for 5-6 years. She thinks it worked for 6 mo after placed.   She has never tried botox    No vaginal discharge or bleeding.  No h/o something coming out of vagina   No fever/chills  No weight loss, appetite normal.    no Constipation  2 History of vaginal deliveries    Occasional pelvic pain    Past Medical History  Past Medical History:   Diagnosis Date   • Anxiety and depression    • Body piercing     both ears   • Cancer (CMS/HCC)     MELANOMA-right breast, back   • Diverticulitis    • Hiatal hernia    • History of being tatooed    • Hot flashes    • Kidney stones    • Menstrual changes    • Pelvic pain    • Pneumonia    • Urinary incontinence    • Vitamin D deficiency    • Wears glasses     for reading       Past Surgical History  Past Surgical History:   Procedure Laterality Date   • BLADDER SURGERY      sling x 2   •  SECTION      x1   • CHOLECYSTECTOMY     • COLONOSCOPY     • ENDOSCOPY     • INTERSTIM REMOVAL N/A 10/23/2019    Procedure: INTERSTIM REMOVAL;  Surgeon: Jose Adams MD;  Location: [facility];  Service: Urology   • SKIN BIOPSY     • TONSILLECTOMY AND ADENOIDECTOMY     • TUBAL ABDOMINAL LIGATION "         Medications  Current Outpatient Medications   Medication Sig Dispense Refill   • B Complex Vitamins (VITAMIN B COMPLEX PO) Take 1 capsule by mouth Daily.     • busPIRone (BUSPAR) 5 MG tablet Take 5 mg by mouth 3 (Three) Times a Day.     • docusate sodium (COLACE) 100 MG capsule Take 1 capsule by mouth 2 (Two) Times a Day. If taking percocet 15 capsule 1   • FLUoxetine (PROzac) 20 MG capsule Take 20 mg by mouth Daily.     • fluticasone (FLONASE) 50 MCG/ACT nasal spray      • ibuprofen (ADVIL,MOTRIN) 800 MG tablet      • medroxyPROGESTERone (PROVERA) 10 MG tablet Take 10 mg by mouth Take As Directed. Takes for 10 days/month     • methocarbamol (ROBAXIN) 750 MG tablet      • oxyCODONE (ROXICODONE) 5 MG immediate release tablet Take 1 tablet by mouth Every 6 (Six) Hours As Needed for Moderate Pain  or Severe Pain . 5 tablet 0   • vitamin C (ASCORBIC ACID) 500 MG tablet Take 500 mg by mouth Daily.     • vitamin D (ERGOCALCIFEROL) 48726 units capsule capsule Take 50,000 Units by mouth Every 7 (Seven) Days.     • VITAMIN E PO Take 1 capsule by mouth Daily.       No current facility-administered medications for this visit.        Allergies  Allergies   Allergen Reactions   • Penicillins Anaphylaxis   • Sulfa Antibiotics Hives       Social History  Social History     Socioeconomic History   • Marital status:      Spouse name: Not on file   • Number of children: Not on file   • Years of education: Not on file   • Highest education level: Not on file   Tobacco Use   • Smoking status: Current Every Day Smoker     Packs/day: 0.50     Years: 30.00     Pack years: 15.00     Types: Cigarettes   • Smokeless tobacco: Never Used   Substance and Sexual Activity   • Alcohol use: No   • Drug use: No   • Sexual activity: Defer       Family History  She has no family history of bladder CA  RCC in mother     Review of Systems  Constitutional: No fevers or chills  Skin: Negative for rash  Endocrine: No heat/cold intolerance  "  Cardiovascular: Negative for chest pain or dyspnea on exertion  Respiratory: Negative for shortness of breath or wheezing  Gastrointestinal: No nausea or vomiting  Genitourinary: Negative for current gross hematuria.  Musculoskeletal: No flank pain  Neurological:  Negative for frequent headaches or dizziness  Lymph/Heme: Negative for leg swelling or calf pain.    Physical Exam  Visit Vitals  Resp 18   Ht 170.2 cm (67.01\")   Wt 105 kg (231 lb)   BMI 36.17 kg/m²     Constitutional: NAD, WDWN  HEENT: NCAT. Conjunctivae normal  MMM  Cardiovascular: Regular rate  Pulmonary/Chest: Respirations are even and non-labored bilaterally  Abdominal: Soft with no distension, tenderness, masses, guarding or CVA tenderness; the battery unit for her InterStim device is placed very high in her lower back, well above her belt line.   Neurological: A + O x 3,  cranial nerves II-XII grossly intact  Extremities: PARDEEP x 4, warm, no clubbing, no cyanosis  Skin: Pink, warm, dry with no rash  Psychiatric:  Normal mood and affect    Genitourinary:   No real stress urinary incontinence on cough leak test    Labs  Brief Urine Lab Results  (Last result in the past 365 days)      Color   Clarity   Blood   Leuk Est   Nitrite   Protein   CREAT   Urine HCG        10/23/19 0835               Negative           Lab Results   Component Value Date    GLUCOSE 98 03/15/2019    CALCIUM 8.8 03/15/2019     03/15/2019    K 3.6 03/15/2019    CO2 20.5 (L) 03/15/2019     03/15/2019    BUN 5 (L) 03/15/2019    CREATININE 0.88 03/15/2019    EGFRIFAFRI 61 06/22/2016    EGFRIFNONA 69 03/15/2019    BCR 5.7 (L) 03/15/2019    ANIONGAP 12.5 03/15/2019       Lab Results   Component Value Date    WBC 8.04 03/15/2019    HGB 14.1 03/15/2019    HCT 43.4 03/15/2019    MCV 89.1 03/15/2019     03/15/2019       Xr Pelvis 1 Or 2 View  Result Date: 9/5/2019  Left sacral nerve stimulator in place.  This report was finalized on 9/5/2019 3:16 PM by Ventura" EULALIO Tracey.    Ct Abdomen Pelvis Stone Protocol  Result Date: 8/16/2019  No evidence of stone or obstruction was seen involving the collecting system of either kidney.  846.43 mGy.cm The radiation dose reduction device was utilized for each scan per the ALARA (as low as reasonably achievable) protocol.  This report was finalized on 8/16/2019 2:07 PM by Dr. Chapo Rosalse II, MD.      Preprocedure diagnosis  Urge urinary incontinence    Postprocedure diagnosis  No bladder pathology identified.  No mesh erosion identified.    Procedure  Flexible Cystourethroscopy    Attending surgeon  Jose Adams MD    Anesthesia  2% lidocaine jelly intraurethrally    Complications  None    Indications  49 y.o. female undergoing a flexible cystoscopy for the above mentioned indications.    Informed consent was obtained.      Findings  Cystoscopy revealed one right and left ureteral orifice in the normal anatomic position, normal bladder mucosa and no tumors, masses or stones.      Procedure  The patient was placed in supine position and prepped and draped in sterile fashion with lidocaine jelly per urethra for anesthesia.  A timeout was performed.  The 14F flexible cystoscope was lubricated and gently placed through the urethra and into the bladder.  The bladder was completely visualized.  The cystoscope was retroflexed and the bladder neck visualized.  The scope was withdrawn and the procedure terminated.  The patient tolerated the procedure well.          I have personally reviewed her labs and  imaging.     Assessment  Ms. Edouard is a 49 y.o. female with mixed urinary incontinence, STEFF >UUI. She has a history of mid urethral sling as well as InterStim, which no longer functions, was placed in 2008, and the battery unit is placed too high above her belt line, and rubs against chairs.  She is status post removal of her InterStim device and presents today for Botox.    OPERATIVE NOTE:  DATE:  11/15/19    OPERATION  PERFORMED:   Cystoscopy with bladder Botox (onabotulinumtoxin A) injection ( 100 units). NDC#3417-1528-54    PREOPERATIVE DIAGNOSES:   1. Refractory Overactive bladder  2. Urinary incontinence.     POSTOPERATIVE DIAGNOSES:   Same     ATTENDING SURGEON:   Jose Adams MD    SPECIMEN: none    BLOOD LOSS :  0 ml    COMPLICATIONS: none    INDICATION: Briseyda Edouard is a 49 y.o. yrs old patient with Refractory Overactive bladder, who has failed multiple PO meds. After discussion, a decision was made to proceed with bladder botox injection.   The efficacy is close to 80% with 6% chance of retention of urine and need for self catheterization. Pt will usually need re-treatment in 6 - 9 months.    ANESTHESIA: 30 cc of 1 % lidocaine, kept for 30 minutes before procedure.    DESCRIPTION OF OPERATION:   Patient brought into the clinic procedure room, was placed in supine position. Local 1 % lidocaine was placed in bladder with catheter and kept for 30 minutes before procedure. Pt was then converted to a dorsal lithotomy position. The genitalia prepped and draped in the usual sterile manner. Pt received PO Cipro before the procedure.    We then placed a flexible cystoscope per urethra. The bladder was inspected. There were no lesions in the bladder or in the urethra. There were no tumors or calculi. There were grade 1 trabeculations. There was no foreign body.     I personally reconstituted the Botox on a back table. We added a drop of methylene blue for identification of injection site. 100 units were placed in 20 mL of saline to create a 5 unit/mL dilution. We used a bladder map with 5 x 4 grid, injecting the trigone, lateral wall, and posterior wall, injecting 1 mL into each site at a 5 mm depth. Botox was injected without any complications. We took care to avoid any significant vessels. There was no evidence of any bleeding or systemic absorption at the end of the procedure. The bladder was drained the cystoscope was  removed. The patient tolerated this procedure well.    I was present and participated in the entire procedure.      Plan  1.  Follow-up in 2 weeks for post void residual and then 6 months for repeat Botox      Jose Adams MD

## 2019-12-05 ENCOUNTER — OFFICE VISIT (OUTPATIENT)
Dept: UROLOGY | Facility: CLINIC | Age: 49
End: 2019-12-05

## 2019-12-05 VITALS
HEART RATE: 86 BPM | HEIGHT: 67 IN | DIASTOLIC BLOOD PRESSURE: 86 MMHG | WEIGHT: 231 LBS | OXYGEN SATURATION: 100 % | RESPIRATION RATE: 18 BRPM | BODY MASS INDEX: 36.26 KG/M2 | SYSTOLIC BLOOD PRESSURE: 128 MMHG

## 2019-12-05 DIAGNOSIS — N39.41 URGE INCONTINENCE: ICD-10-CM

## 2019-12-05 PROCEDURE — 99213 OFFICE O/P EST LOW 20 MIN: CPT | Performed by: UROLOGY

## 2019-12-05 PROCEDURE — 51798 US URINE CAPACITY MEASURE: CPT | Performed by: UROLOGY

## 2019-12-05 NOTE — PROGRESS NOTES
"Chief Complaint  ALEXEY    Referring Provider  No ref. provider found    HPI  Ms. Edouard is a 49 y.o. female presents with complaint of urinary incontinence for >10 years. Pt has primarily urge mixed urinary incontinence.     She is here for 2-week follow-up after Botox.  She is not having any leaking she says.  She says she is not using any pads.  She is very happy.    For historical review,  Severity: Pt uses 3-4 pads a day and has tried some Rx, interstim for UUI, and mid urethral sling (5 yrs ago) for STEFF in the past  Associated Sx: Pt has + h/o daytime frequency, urgency and nocturia x3.      No h/o poor stream, straining, hesitancy or incomplete voiding.     2-3 UTI, + gross hematuria in past, + nephrolithiasis in past    She had interstim placed 2008 by Dr. Reveles in [place], [place]. She thinks it has been \"dead\" for 5-6 years. She thinks it worked for 6 mo after placed.   She has never tried botox    No vaginal discharge or bleeding.  No h/o something coming out of vagina   No fever/chills  No weight loss, appetite normal.    no Constipation  2 History of vaginal deliveries    Occasional pelvic pain    Past Medical History  Past Medical History:   Diagnosis Date   • Anxiety and depression    • Body piercing     both ears   • Cancer (CMS/HCC)     MELANOMA-right breast, back   • Diverticulitis    • Hiatal hernia    • History of being tatooed    • Hot flashes    • Kidney stones    • Menstrual changes    • Pelvic pain    • Pneumonia    • Urinary incontinence    • Vitamin D deficiency    • Wears glasses     for reading       Past Surgical History  Past Surgical History:   Procedure Laterality Date   • BLADDER SURGERY      sling x 2   •  SECTION      x1   • CHOLECYSTECTOMY     • COLONOSCOPY     • ENDOSCOPY     • INTERSTIM REMOVAL N/A 10/23/2019    Procedure: INTERSTIM REMOVAL;  Surgeon: Jose Adams MD;  Location: [facility];  Service: Urology   • SKIN BIOPSY     • TONSILLECTOMY AND ADENOIDECTOMY   "   • TUBAL ABDOMINAL LIGATION         Medications  Current Outpatient Medications   Medication Sig Dispense Refill   • B Complex Vitamins (VITAMIN B COMPLEX PO) Take 1 capsule by mouth Daily.     • busPIRone (BUSPAR) 5 MG tablet Take 5 mg by mouth 3 (Three) Times a Day.     • docusate sodium (COLACE) 100 MG capsule Take 1 capsule by mouth 2 (Two) Times a Day. If taking percocet 15 capsule 1   • FLUoxetine (PROzac) 20 MG capsule Take 20 mg by mouth Daily.     • fluticasone (FLONASE) 50 MCG/ACT nasal spray      • ibuprofen (ADVIL,MOTRIN) 800 MG tablet      • medroxyPROGESTERone (PROVERA) 10 MG tablet Take 10 mg by mouth Take As Directed. Takes for 10 days/month     • methocarbamol (ROBAXIN) 750 MG tablet      • oxyCODONE (ROXICODONE) 5 MG immediate release tablet Take 1 tablet by mouth Every 6 (Six) Hours As Needed for Moderate Pain  or Severe Pain . 5 tablet 0   • vitamin C (ASCORBIC ACID) 500 MG tablet Take 500 mg by mouth Daily.     • vitamin D (ERGOCALCIFEROL) 25932 units capsule capsule Take 50,000 Units by mouth Every 7 (Seven) Days.     • VITAMIN E PO Take 1 capsule by mouth Daily.       No current facility-administered medications for this visit.        Allergies  Allergies   Allergen Reactions   • Penicillins Anaphylaxis   • Sulfa Antibiotics Hives       Social History  Social History     Socioeconomic History   • Marital status:      Spouse name: Not on file   • Number of children: Not on file   • Years of education: Not on file   • Highest education level: Not on file   Tobacco Use   • Smoking status: Current Every Day Smoker     Packs/day: 0.50     Years: 30.00     Pack years: 15.00     Types: Cigarettes   • Smokeless tobacco: Never Used   Substance and Sexual Activity   • Alcohol use: No   • Drug use: No   • Sexual activity: Defer       Family History  She has no family history of bladder CA  RCC in mother     Review of Systems  Constitutional: No fevers or chills  Skin: Negative for  "rash  Endocrine: No heat/cold intolerance   Cardiovascular: Negative for chest pain or dyspnea on exertion  Respiratory: Negative for shortness of breath or wheezing  Gastrointestinal: No nausea or vomiting  Genitourinary: Negative for current gross hematuria.  Musculoskeletal: No flank pain  Neurological:  Negative for frequent headaches or dizziness  Lymph/Heme: Negative for leg swelling or calf pain.    Physical Exam  Visit Vitals  /86   Pulse 86   Resp 18   Ht 170.2 cm (67.01\")   Wt 105 kg (231 lb)   SpO2 100%   BMI 36.17 kg/m²     Constitutional: NAD, WDWN  HEENT: NCAT. Conjunctivae normal  MMM  Cardiovascular: Regular rate  Pulmonary/Chest: Respirations are even and non-labored bilaterally  Abdominal: Soft with no distension, tenderness, masses, guarding or CVA tenderness; the battery unit for her InterStim device is placed very high in her lower back, well above her belt line.   Neurological: A + O x 3,  cranial nerves II-XII grossly intact  Extremities: APRDEEP x 4, warm, no clubbing, no cyanosis  Skin: Pink, warm, dry with no rash  Psychiatric:  Normal mood and affect    Genitourinary:   No real stress urinary incontinence on cough leak test    Labs  Brief Urine Lab Results  (Last result in the past 365 days)      Color   Clarity   Blood   Leuk Est   Nitrite   Protein   CREAT   Urine HCG        11/15/19 1628 Yellow Clear 2+ Negative Negative Negative               Lab Results   Component Value Date    GLUCOSE 98 03/15/2019    CALCIUM 8.8 03/15/2019     03/15/2019    K 3.6 03/15/2019    CO2 20.5 (L) 03/15/2019     03/15/2019    BUN 5 (L) 03/15/2019    CREATININE 0.88 03/15/2019    EGFRIFAFRI 61 06/22/2016    EGFRIFNONA 69 03/15/2019    BCR 5.7 (L) 03/15/2019    ANIONGAP 12.5 03/15/2019       Lab Results   Component Value Date    WBC 8.04 03/15/2019    HGB 14.1 03/15/2019    HCT 43.4 03/15/2019    MCV 89.1 03/15/2019     03/15/2019       Xr Pelvis 1 Or 2 View  Result Date: 9/5/2019  Left " sacral nerve stimulator in place.  This report was finalized on 9/5/2019 3:16 PM by Ventura Tracey M.D..    Ct Abdomen Pelvis Stone Protocol  Result Date: 8/16/2019  No evidence of stone or obstruction was seen involving the collecting system of either kidney.  846.43 mGy.cm The radiation dose reduction device was utilized for each scan per the ALARA (as low as reasonably achievable) protocol.  This report was finalized on 8/16/2019 2:07 PM by Dr. Chapo Rosales II, MD.      PVR  Post-void residual performed with ultrasound scanner by staff and interpreted by me -13  This was done to make sure she is not part of the 6% the develop urinary retention after Botox.        I have personally reviewed her labs and  imaging.     Assessment  Ms. Edouard is a 49 y.o. female with mixed urinary incontinence, STEFF >UUI. She has a history of mid urethral sling as well as InterStim, which no longer functions, was placed in 2008, and the battery unit is placed too high above her belt line, and rubs against chairs.  She is status post removal of her InterStim device and presents today for Botox.      Plan  1.  Follow-up in  6 months for repeat Botox      Jose Adams MD

## 2019-12-12 ENCOUNTER — OFFICE VISIT (OUTPATIENT)
Dept: SURGERY | Facility: CLINIC | Age: 49
End: 2019-12-12

## 2019-12-12 VITALS — HEIGHT: 67 IN | BODY MASS INDEX: 36.33 KG/M2 | WEIGHT: 231.48 LBS

## 2019-12-12 DIAGNOSIS — M25.512 ACUTE PAIN OF LEFT SHOULDER: Primary | ICD-10-CM

## 2019-12-12 PROCEDURE — 99202 OFFICE O/P NEW SF 15 MIN: CPT | Performed by: SURGERY

## 2019-12-12 NOTE — PROGRESS NOTES
Subjective   Briseyda Edouard is a 49 y.o. female.     Chief Complaint: left shoulder pain and lump    History of Present Illness She has had a pain and lump in the scapula area of her left shoulder that seems to vary in size at times. She had a melanoma excised there a few years ago but no sign of recurrence. She does do a lot of lifting patients at work.     The following portions of the patient's history were reviewed and updated as appropriate: current medications, past family history, past medical history, past social history, past surgical history and problem list.    Review of Systems    Objective   Physical Exam I cannot feel a mass. The old scar is well healed. No adenopathy felt.    Assessment/Plan   Briseyda was seen today for mass.    Diagnoses and all orders for this visit:    Acute pain of left shoulder    the pain may be from muscle spasm. If the lump becomes more defined return.

## 2020-07-09 ENCOUNTER — PROCEDURE VISIT (OUTPATIENT)
Dept: UROLOGY | Facility: CLINIC | Age: 50
End: 2020-07-09

## 2020-07-09 VITALS — HEIGHT: 67 IN | WEIGHT: 231 LBS | BODY MASS INDEX: 36.26 KG/M2

## 2020-07-09 DIAGNOSIS — R31.29 MICROSCOPIC HEMATURIA: Primary | ICD-10-CM

## 2020-07-09 DIAGNOSIS — N39.41 URGE INCONTINENCE: ICD-10-CM

## 2020-07-09 LAB
BILIRUB BLD-MCNC: NEGATIVE MG/DL
CLARITY, POC: CLEAR
COLOR UR: YELLOW
GLUCOSE UR STRIP-MCNC: NEGATIVE MG/DL
KETONES UR QL: NEGATIVE
LEUKOCYTE EST, POC: NEGATIVE
NITRITE UR-MCNC: NEGATIVE MG/ML
PH UR: 6 [PH] (ref 5–8)
PROT UR STRIP-MCNC: NEGATIVE MG/DL
RBC # UR STRIP: ABNORMAL /UL
SP GR UR: 1.02 (ref 1–1.03)
UROBILINOGEN UR QL: NORMAL

## 2020-07-09 PROCEDURE — 81003 URINALYSIS AUTO W/O SCOPE: CPT | Performed by: UROLOGY

## 2020-07-09 PROCEDURE — 52287 CYSTOSCOPY CHEMODENERVATION: CPT | Performed by: UROLOGY

## 2020-07-10 NOTE — PROGRESS NOTES
OPERATIVE NOTE:    DATE: 7/9/2020    OPERATION PERFORMED:   Cystoscopy with bladder Botox (onabotulinumtoxin A) injection ( 100 units). NDC#9952-5505-24    PREOPERATIVE DIAGNOSES:   1. Refractory Overactive bladder  2. Urinary incontinence.     POSTOPERATIVE DIAGNOSES:   Same     ATTENDING SURGEON:   Jose Adams MD    SPECIMEN: none    BLOOD LOSS : 0 ml    COMPLICATIONS: none    INDICATION: Briseyda Edouard is a 49 y.o. yrs old patient with Refractory Overactive bladder, who has failed multiple PO meds. After discussion, a decision was made to proceed with bladder botox injection.   The efficacy is close to 80% with 6% chance of retention of urine and need for self catheterization. Pt will usually need re-treatment in 6 - 9 months.    ANESTHESIA: 30 cc of 1 % lidocaine, kept for 30 minutes before procedure.    DESCRIPTION OF OPERATION:   Patient brought into the clinic procedure room, was placed in supine position. Local 1 % lidocaine was placed in bladder with catheter and kept for 30 minutes before procedure. Pt was then converted to a dorsal lithotomy position. The genitalia prepped and draped in the usual sterile manner. Pt received PO Cipro before the procedure.    We then placed a flexible cystoscope per urethra. The bladder was inspected. There were no lesions in the bladder or in the urethra. There were no tumors or calculi. There were grade 1 trabeculations. There was no foreign body.     I personally reconstituted the Botox on a back table. We added a drop of methylene blue for identification of injection site.  100 hundred units were placed in 20 mL of saline to create a 5 unit/mL dilution. We used a bladder map with 5 x 4 grid, injecting the trigone, lateral wall, and posterior wall, injecting 1 mL into each site at a 5 mm depth. Botox was injected without any complications. We took care to avoid any significant vessels. There was no evidence of any bleeding or systemic absorption at the end of the  procedure. The bladder was drained the cystoscope was removed. The patient tolerated this procedure well.    I was present and participated in the entire procedure.

## 2020-08-06 ENCOUNTER — OFFICE VISIT (OUTPATIENT)
Dept: UROLOGY | Facility: CLINIC | Age: 50
End: 2020-08-06

## 2020-08-06 VITALS
SYSTOLIC BLOOD PRESSURE: 138 MMHG | HEIGHT: 67 IN | HEART RATE: 89 BPM | RESPIRATION RATE: 18 BRPM | WEIGHT: 231 LBS | TEMPERATURE: 99.2 F | OXYGEN SATURATION: 99 % | DIASTOLIC BLOOD PRESSURE: 72 MMHG | BODY MASS INDEX: 36.26 KG/M2

## 2020-08-06 DIAGNOSIS — N39.0 URINARY TRACT INFECTION WITHOUT HEMATURIA, SITE UNSPECIFIED: ICD-10-CM

## 2020-08-06 DIAGNOSIS — N39.41 URGE INCONTINENCE: Primary | ICD-10-CM

## 2020-08-06 LAB
BILIRUB BLD-MCNC: NEGATIVE MG/DL
CLARITY, POC: CLEAR
COLOR UR: YELLOW
GLUCOSE UR STRIP-MCNC: NEGATIVE MG/DL
KETONES UR QL: NEGATIVE
LEUKOCYTE EST, POC: NEGATIVE
NITRITE UR-MCNC: POSITIVE MG/ML
PH UR: 5.5 [PH] (ref 5–8)
PROT UR STRIP-MCNC: NEGATIVE MG/DL
RBC # UR STRIP: ABNORMAL /UL
SP GR UR: 1.02 (ref 1–1.03)
UROBILINOGEN UR QL: NORMAL

## 2020-08-06 PROCEDURE — 51798 US URINE CAPACITY MEASURE: CPT | Performed by: UROLOGY

## 2020-08-06 PROCEDURE — 99214 OFFICE O/P EST MOD 30 MIN: CPT | Performed by: UROLOGY

## 2020-08-06 RX ORDER — NITROFURANTOIN 25; 75 MG/1; MG/1
100 CAPSULE ORAL 2 TIMES DAILY
Qty: 14 CAPSULE | Refills: 0 | Status: SHIPPED | OUTPATIENT
Start: 2020-08-06 | End: 2020-08-21

## 2020-08-06 NOTE — PROGRESS NOTES
"Chief Complaint  ALEXEY    Referring Provider  No ref. provider found    HPI  Ms. Edouard is a 49 y.o. female presents with complaint of urinary incontinence for >10 years. Pt has primarily urge mixed urinary incontinence.     She is here for 2-week follow-up after Botox.  She is not having any leaking she says.  She says she is not using any pads.  She does think she has a urinary tract infection today due to dysuria.    For historical review,  Severity: Pt uses 3-4 pads a day and has tried some Rx, interstim for UUI, and mid urethral sling (5 yrs ago) for STEFF in the past  Associated Sx: Pt has + h/o daytime frequency, urgency and nocturia x3.      No h/o poor stream, straining, hesitancy or incomplete voiding.     2-3 UTI, + gross hematuria in past, + nephrolithiasis in past    She had interstim placed 2008 by Dr. Reveles in [place], [place]. She thinks it has been \"dead\" for 5-6 years. She thinks it worked for 6 mo after placed.   She has never tried botox    No vaginal discharge or bleeding.  No h/o something coming out of vagina   No fever/chills  No weight loss, appetite normal.    no Constipation  2 History of vaginal deliveries    Occasional pelvic pain    Past Medical History  Past Medical History:   Diagnosis Date   • Anxiety and depression    • Body piercing     both ears   • Cancer (CMS/HCC)     MELANOMA-right breast, back   • Diverticulitis    • Hiatal hernia    • History of being tatooed    • Hot flashes    • Kidney stones    • Menstrual changes    • Pelvic pain    • Pneumonia    • Urinary incontinence    • Vitamin D deficiency    • Wears glasses     for reading       Past Surgical History  Past Surgical History:   Procedure Laterality Date   • BLADDER SURGERY      sling x 2   •  SECTION      x1   • CHOLECYSTECTOMY     • COLONOSCOPY     • ENDOSCOPY     • INTERSTIM REMOVAL N/A 10/23/2019    Procedure: INTERSTIM REMOVAL;  Surgeon: Jose Adams MD;  Location: [facility];  Service: Urology   • " SKIN BIOPSY     • TONSILLECTOMY AND ADENOIDECTOMY     • TUBAL ABDOMINAL LIGATION         Medications  Current Outpatient Medications   Medication Sig Dispense Refill   • B Complex Vitamins (VITAMIN B COMPLEX PO) Take 1 capsule by mouth Daily.     • busPIRone (BUSPAR) 5 MG tablet Take 5 mg by mouth 3 (Three) Times a Day.     • FLUoxetine (PROzac) 20 MG capsule Take 10 mg by mouth 3 (Three) Times a Day.     • vitamin C (ASCORBIC ACID) 500 MG tablet Take 500 mg by mouth Daily.     • vitamin D (ERGOCALCIFEROL) 56085 units capsule capsule Take 50,000 Units by mouth Every 7 (Seven) Days.     • VITAMIN E PO Take 1 capsule by mouth Daily.     • docusate sodium (COLACE) 100 MG capsule Take 1 capsule by mouth 2 (Two) Times a Day. If taking percocet 15 capsule 1   • fluticasone (FLONASE) 50 MCG/ACT nasal spray      • ibuprofen (ADVIL,MOTRIN) 800 MG tablet      • medroxyPROGESTERone (PROVERA) 10 MG tablet Take 10 mg by mouth Take As Directed. Takes for 10 days/month     • methocarbamol (ROBAXIN) 750 MG tablet      • oxyCODONE (ROXICODONE) 5 MG immediate release tablet Take 1 tablet by mouth Every 6 (Six) Hours As Needed for Moderate Pain  or Severe Pain . 5 tablet 0     No current facility-administered medications for this visit.        Allergies  Allergies   Allergen Reactions   • Penicillins Anaphylaxis       Social History  Social History     Socioeconomic History   • Marital status:      Spouse name: Not on file   • Number of children: Not on file   • Years of education: Not on file   • Highest education level: Not on file   Tobacco Use   • Smoking status: Current Every Day Smoker     Packs/day: 0.50     Years: 30.00     Pack years: 15.00     Types: Cigarettes   • Smokeless tobacco: Never Used   Substance and Sexual Activity   • Alcohol use: No   • Drug use: No   • Sexual activity: Defer       Family History  She has no family history of bladder CA  RCC in mother     Review of Systems  Constitutional: No fevers or  "chills  Skin: Negative for rash  Endocrine: No heat/cold intolerance   Cardiovascular: Negative for chest pain or dyspnea on exertion  Respiratory: Negative for shortness of breath or wheezing  Gastrointestinal: No nausea or vomiting  Genitourinary: Negative for current gross hematuria.  Musculoskeletal: No flank pain  Neurological:  Negative for frequent headaches or dizziness  Lymph/Heme: Negative for leg swelling or calf pain.    Physical Exam  Visit Vitals  /82   Pulse 89   Temp 99.2 °F (37.3 °C)   Resp 18   Ht 170.2 cm (67.01\")   Wt 105 kg (231 lb)   SpO2 99%   BMI 36.17 kg/m²     Constitutional: NAD, WDWN  HEENT: NCAT. Conjunctivae normal  MMM  Cardiovascular: Regular rate  Pulmonary/Chest: Respirations are even and non-labored bilaterally  Abdominal: Soft with no distension, tenderness, masses, guarding or CVA tenderness;  Neurological: A + O x 3,  cranial nerves II-XII grossly intact  Extremities: PARDEEP x 4, warm, no clubbing, no cyanosis  Skin: Pink, warm, dry with no rash  Psychiatric:  Normal mood and affect    Genitourinary:   No real stress urinary incontinence on cough leak test    Labs  Brief Urine Lab Results  (Last result in the past 365 days)      Color   Clarity   Blood   Leuk Est   Nitrite   Protein   CREAT   Urine HCG        07/09/20 1112 Yellow Clear 3+ Negative Negative Negative               Lab Results   Component Value Date    GLUCOSE 98 03/15/2019    CALCIUM 8.8 03/15/2019     03/15/2019    K 3.6 03/15/2019    CO2 20.5 (L) 03/15/2019     03/15/2019    BUN 5 (L) 03/15/2019    CREATININE 0.88 03/15/2019    EGFRIFAFRI 61 06/22/2016    EGFRIFNONA 69 03/15/2019    BCR 5.7 (L) 03/15/2019    ANIONGAP 12.5 03/15/2019       Lab Results   Component Value Date    WBC 8.04 03/15/2019    HGB 14.1 03/15/2019    HCT 43.4 03/15/2019    MCV 89.1 03/15/2019     03/15/2019       Xr Pelvis 1 Or 2 View  Result Date: 9/5/2019  Left sacral nerve stimulator in place.  This report was " finalized on 9/5/2019 3:16 PM by Ventura Tracey M.D..    Ct Abdomen Pelvis Stone Protocol  Result Date: 8/16/2019  No evidence of stone or obstruction was seen involving the collecting system of either kidney.  846.43 mGy.cm The radiation dose reduction device was utilized for each scan per the ALARA (as low as reasonably achievable) protocol.  This report was finalized on 8/16/2019 2:07 PM by Dr. Chapo Rosales II, MD.      PVR  Post-void residual performed with ultrasound scanner by staff and interpreted by me -109 mL  This was done to make sure she is not part of the 6% the develop urinary retention after Botox.        I have personally reviewed her labs and  imaging.     Assessment  Ms. Edouard is a 49 y.o. female with mixed urinary incontinence, STEFF >UUI. She has a history of mid urethral sling as well as InterStim, which no longer functions, was placed in 2008, and the battery unit is placed too high above her belt line, and rubs against chairs.  She is status post removal of her InterStim device and presents today for FU after Botox.    She was retaining a small amount of urine, approximately 100 mL today.  She has to work more to empty, but is emptying.  She may have a urinary tract infection today.    Plan  1.  Follow-up in 1 month for repeat PVR and 6 months for repeat Botox  2   Send UCx      Jose Adams MD

## 2020-08-08 LAB
BACTERIA UR CULT: ABNORMAL
BACTERIA UR CULT: ABNORMAL
OTHER ANTIBIOTIC SUSC ISLT: ABNORMAL

## 2020-08-21 ENCOUNTER — OFFICE VISIT (OUTPATIENT)
Dept: UROLOGY | Facility: CLINIC | Age: 50
End: 2020-08-21

## 2020-08-21 VITALS — HEIGHT: 67 IN | TEMPERATURE: 98.4 F | BODY MASS INDEX: 36.18 KG/M2

## 2020-08-21 DIAGNOSIS — N32.81 DETRUSOR INSTABILITY: ICD-10-CM

## 2020-08-21 DIAGNOSIS — R31.29 MICROSCOPIC HEMATURIA: Primary | ICD-10-CM

## 2020-08-21 DIAGNOSIS — N39.41 URGE INCONTINENCE OF URINE: ICD-10-CM

## 2020-08-21 LAB
BILIRUB BLD-MCNC: NEGATIVE MG/DL
CLARITY, POC: CLEAR
COLOR UR: YELLOW
GLUCOSE UR STRIP-MCNC: NEGATIVE MG/DL
KETONES UR QL: NEGATIVE
LEUKOCYTE EST, POC: NEGATIVE
NITRITE UR-MCNC: NEGATIVE MG/ML
PH UR: 5.5 [PH] (ref 5–8)
PROT UR STRIP-MCNC: ABNORMAL MG/DL
RBC # UR STRIP: ABNORMAL /UL
SP GR UR: 1.02 (ref 1–1.03)
UROBILINOGEN UR QL: ABNORMAL

## 2020-08-21 PROCEDURE — 99213 OFFICE O/P EST LOW 20 MIN: CPT | Performed by: UROLOGY

## 2020-08-21 PROCEDURE — 51798 US URINE CAPACITY MEASURE: CPT | Performed by: UROLOGY

## 2020-08-21 RX ORDER — LISINOPRIL 10 MG/1
10 TABLET ORAL DAILY
COMMUNITY

## 2020-08-21 NOTE — PROGRESS NOTES
Chief Complaint:          Chief Complaint   Patient presents with   • Blood in Urine     Yearly fu        HPI:   49 y.o. female  previously followed with Lexi Washburn always has visible blood had 3 urinary tract infections last year.  Mother is in hospice secondary to renal cell carcinoma she had the diagnosis in the 70's she never had chemotherapy she has a known large hiatal hernia.  She had a negative cystoscopy in 2017 negative CT and they have 2017 she's had mesh placed and mesh removed.  She has a InterStim stimulator this never worked in causing significant dysfunction she has severe urinary incontinence.  She is a  3 para 3 her urine today was 3+ positive for blood I'm going to get a renal ultrasound and follow-up with her based on this she'll need continued follow-up because the gross hematuria we also discussed removing her stimulator unfortunately this is not something I can do here because I have no certification I offered to refer her to the [facility].  She returns today in follow-up of microscopic hematuria today urine was positive for blood she did have an episode of gross hematuria she saw 2 months ago in the emergency room but was not admitted she had fevers chills no nausea no vomiting no diarrhea but weakness in bilateral low back pain I am in a repeat a CT because of the episode of gross painless hematuria  She returns today I reviewed her CT scan which was a stone protocol.  She is had frequent infections.  She sees a gynecologist has her Pap smears.  She has a very significant family history of renal cell carcinoma in her mother who had a very long and painful course.  She has severe pain from the prior InterStim and I would recommend removal I will set her up with Dr. Jose Adams in [place], I will see her back in 12 months    The image depicted above shows normal upper tracts this was noncontrast CT scan.  She returns today.  She is incontinence.  She saw Dr. Adams  who removed her stimulator and did a Botox she was pleased with.  First 1 was great she was told at work to well her residual is negligible today.  Her urine is negative I gave her reassurance I will see her back in 6 months she requested to follow here because of the drive and this is certainly reasonable.      Past Medical History:        Past Medical History:   Diagnosis Date   • Anxiety and depression    • Body piercing     both ears   • Cancer (CMS/HCC)     MELANOMA-right breast, back   • Diverticulitis    • Hiatal hernia    • History of being tatooed    • Hot flashes    • Kidney stones    • Menstrual changes    • Pelvic pain    • Pneumonia    • Urinary incontinence    • Vitamin D deficiency    • Wears glasses     for reading         Current Meds:     Current Outpatient Medications   Medication Sig Dispense Refill   • B Complex Vitamins (VITAMIN B COMPLEX PO) Take 1 capsule by mouth Daily.     • busPIRone (BUSPAR) 5 MG tablet Take 5 mg by mouth 3 (Three) Times a Day.     • FLUoxetine (PROzac) 20 MG capsule Take 10 mg by mouth 3 (Three) Times a Day.     • lisinopril (PRINIVIL,ZESTRIL) 10 MG tablet Take 10 mg by mouth Daily.     • vitamin C (ASCORBIC ACID) 500 MG tablet Take 500 mg by mouth Daily.     • vitamin D (ERGOCALCIFEROL) 88196 units capsule capsule Take 50,000 Units by mouth Every 7 (Seven) Days.     • ibuprofen (ADVIL,MOTRIN) 800 MG tablet        No current facility-administered medications for this visit.         Allergies:      Allergies   Allergen Reactions   • Penicillins Anaphylaxis        Past Surgical History:     Past Surgical History:   Procedure Laterality Date   • BLADDER SURGERY      sling x 2   •  SECTION      x1   • CHOLECYSTECTOMY     • COLONOSCOPY     • ENDOSCOPY     • INTERSTIM REMOVAL N/A 10/23/2019    Procedure: INTERSTIM REMOVAL;  Surgeon: Jose Adams MD;  Location: [facility];  Service: Urology   • SKIN BIOPSY     • TONSILLECTOMY AND ADENOIDECTOMY     • TUBAL  ABDOMINAL LIGATION           Social History:     Social History     Socioeconomic History   • Marital status:      Spouse name: Not on file   • Number of children: Not on file   • Years of education: Not on file   • Highest education level: Not on file   Tobacco Use   • Smoking status: Current Every Day Smoker     Packs/day: 0.50     Years: 30.00     Pack years: 15.00     Types: Cigarettes   • Smokeless tobacco: Never Used   Substance and Sexual Activity   • Alcohol use: No   • Drug use: No   • Sexual activity: Defer       Family History:     Family History   Problem Relation Age of Onset   • Cancer Mother         renal cell carconma   • Diabetes Father    • Hypertension Father        Review of Systems:     Review of Systems   Constitutional: Negative.  Negative for activity change, appetite change, chills, diaphoresis, fatigue and unexpected weight change.   HENT: Negative for congestion, dental problem, drooling, ear discharge, ear pain, facial swelling, hearing loss, mouth sores, nosebleeds, postnasal drip, rhinorrhea, sinus pressure, sneezing, sore throat, tinnitus, trouble swallowing and voice change.    Eyes: Negative.  Negative for photophobia, pain, discharge, redness, itching and visual disturbance.   Respiratory: Negative.  Negative for apnea, cough, choking, chest tightness, shortness of breath, wheezing and stridor.    Cardiovascular: Negative.  Negative for chest pain, palpitations and leg swelling.   Gastrointestinal: Negative.  Negative for abdominal distention, abdominal pain, anal bleeding, blood in stool, constipation, diarrhea, nausea, rectal pain and vomiting.   Endocrine: Negative.  Negative for cold intolerance, heat intolerance, polydipsia, polyphagia and polyuria.   Musculoskeletal: Negative.  Negative for arthralgias, back pain, gait problem, joint swelling, myalgias, neck pain and neck stiffness.   Skin: Negative.  Negative for color change, pallor, rash and wound.      Allergic/Immunologic: Negative.  Negative for environmental allergies, food allergies and immunocompromised state.   Neurological: Negative.  Negative for dizziness, tremors, seizures, syncope, facial asymmetry, speech difficulty, weakness, light-headedness, numbness and headaches.   Hematological: Negative.  Negative for adenopathy. Does not bruise/bleed easily.   Psychiatric/Behavioral: Negative for agitation, behavioral problems, confusion, decreased concentration, dysphoric mood, hallucinations, self-injury, sleep disturbance and suicidal ideas. The patient is not nervous/anxious and is not hyperactive.    All other systems reviewed and are negative.      Physical Exam:     Physical Exam   Constitutional: She appears well-developed and well-nourished.   HENT:   Head: Normocephalic and atraumatic.   Right Ear: External ear normal.   Left Ear: External ear normal.   Mouth/Throat: Oropharynx is clear and moist.   Eyes: Pupils are equal, round, and reactive to light. Conjunctivae are normal.   Cardiovascular: Normal rate, regular rhythm, normal heart sounds and intact distal pulses.   Pulmonary/Chest: Effort normal and breath sounds normal.   Abdominal: Soft. Bowel sounds are normal. She exhibits no distension and no mass. There is no tenderness. There is no rebound and no guarding.   Genitourinary: No vaginal discharge found.   Musculoskeletal: Normal range of motion.   Neurological: She is alert. She has normal reflexes.   Skin: Skin is warm and dry.   Psychiatric: She has a normal mood and affect. Her behavior is normal. Judgment and thought content normal.       I have reviewed the following portions of the patient's history: allergies, current medications, past family history, past medical history, past social history, past surgical history, problem list and ROS and confirm it's accurate.      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  The mainstay of treatment has been tight cholinergics which basically caused the bladder to have decreased contractility.  We have discussed the side effects of these treatments including dry mouth, double vision, and increasing constipation.  Botox-patient wishes to proceed with a Botox injection in the bladder.  We discussed the efficacy of this treatment.  We discussed the effects on voiding.  I explained that there is an approximate 6% risk of retention with 100 units of Botox injected in the posterior bladder in a submucosal fashion.  We talked about the need for intermittent clean catheterization if urinary retention were to follow.  We discussed the fact that it lasts up to 6 months.  The, we discussed the increasing risk of retention with higher doses.  We discussed the efficacy of this treatment and neurogenic bladders particularly detrusor hyperreflexia and reviewed the appropriate literature in this regard.  Patient wishes to proceed.  She had an unremarkable Botox with negligible residual and she is doing great now            Patient's Body mass index is 36.18 kg/m². BMI is above normal parameters. Recommendations include: educational material.              This document has been electronically signed by ANKUSH LEVY MD August 21, 2020 14:38

## 2020-08-24 PROBLEM — N32.81 DETRUSOR INSTABILITY: Status: ACTIVE | Noted: 2020-08-24

## 2020-09-08 ENCOUNTER — TELEPHONE (OUTPATIENT)
Dept: UROLOGY | Facility: CLINIC | Age: 50
End: 2020-09-08

## 2021-02-22 ENCOUNTER — OFFICE VISIT (OUTPATIENT)
Dept: UROLOGY | Facility: CLINIC | Age: 51
End: 2021-02-22

## 2021-02-22 VITALS — WEIGHT: 242 LBS | BODY MASS INDEX: 37.98 KG/M2 | HEIGHT: 67 IN | TEMPERATURE: 98.1 F

## 2021-02-22 DIAGNOSIS — R31.29 MICROHEMATURIA: Primary | ICD-10-CM

## 2021-02-22 DIAGNOSIS — R31.9 URINARY TRACT INFECTION WITH HEMATURIA, SITE UNSPECIFIED: ICD-10-CM

## 2021-02-22 DIAGNOSIS — N39.41 URGE INCONTINENCE OF URINE: ICD-10-CM

## 2021-02-22 DIAGNOSIS — N39.0 URINARY TRACT INFECTION WITH HEMATURIA, SITE UNSPECIFIED: ICD-10-CM

## 2021-02-22 LAB
BILIRUB BLD-MCNC: NEGATIVE MG/DL
CLARITY, POC: ABNORMAL
COLOR UR: YELLOW
GLUCOSE UR STRIP-MCNC: NEGATIVE MG/DL
KETONES UR QL: NEGATIVE
LEUKOCYTE EST, POC: ABNORMAL
NITRITE UR-MCNC: POSITIVE MG/ML
PH UR: 5.5 [PH] (ref 5–8)
PROT UR STRIP-MCNC: NEGATIVE MG/DL
RBC # UR STRIP: ABNORMAL /UL
SP GR UR: 1.02 (ref 1–1.03)
UROBILINOGEN UR QL: NORMAL

## 2021-02-22 PROCEDURE — 99214 OFFICE O/P EST MOD 30 MIN: CPT | Performed by: UROLOGY

## 2021-02-22 PROCEDURE — 87086 URINE CULTURE/COLONY COUNT: CPT | Performed by: UROLOGY

## 2021-02-22 PROCEDURE — 87186 SC STD MICRODIL/AGAR DIL: CPT | Performed by: UROLOGY

## 2021-02-22 PROCEDURE — 87077 CULTURE AEROBIC IDENTIFY: CPT | Performed by: UROLOGY

## 2021-02-22 RX ORDER — NITROFURANTOIN 25; 75 MG/1; MG/1
100 CAPSULE ORAL 2 TIMES DAILY
Qty: 20 CAPSULE | Refills: 3 | Status: SHIPPED | OUTPATIENT
Start: 2021-02-22 | End: 2021-06-15

## 2021-02-22 RX ORDER — BUSPIRONE HYDROCHLORIDE 10 MG/1
1 TABLET ORAL
COMMUNITY
Start: 2021-01-27

## 2021-02-22 NOTE — PROGRESS NOTES
Chief Complaint:          Chief Complaint   Patient presents with   • Blood in Urine     6 mnth f/u       HPI:   50 y.o. female returns today there is a question of urinary tract infection.  She was given Macrodantin empirically pending the results of her labs.  She is having no complaints but she is desirous of an additional Botox injection as it worked so well last time.  I will go ahead and arrange this for her pending the results of her culture.    Past Medical History:        Past Medical History:   Diagnosis Date   • Anxiety and depression    • Body piercing     both ears   • Cancer (CMS/HCC)     MELANOMA-right breast, back   • Diverticulitis    • Hiatal hernia    • History of being tatooed    • Hot flashes    • Kidney stones    • Menstrual changes    • Pelvic pain    • Pneumonia    • Urinary incontinence    • Vitamin D deficiency    • Wears glasses     for reading         Current Meds:     Current Outpatient Medications   Medication Sig Dispense Refill   • B Complex Vitamins (VITAMIN B COMPLEX PO) Take 1 capsule by mouth Daily.     • busPIRone (BUSPAR) 10 MG tablet Take 1 tablet by mouth 3 (Three) Times a Day.     • FLUoxetine (PROzac) 20 MG capsule Take 10 mg by mouth 3 (Three) Times a Day.     • ibuprofen (ADVIL,MOTRIN) 800 MG tablet      • lisinopril (PRINIVIL,ZESTRIL) 10 MG tablet Take 10 mg by mouth Daily.     • vitamin C (ASCORBIC ACID) 500 MG tablet Take 500 mg by mouth Daily.     • vitamin D (ERGOCALCIFEROL) 44767 units capsule capsule Take 50,000 Units by mouth Every 7 (Seven) Days.       No current facility-administered medications for this visit.         Allergies:      Allergies   Allergen Reactions   • Penicillins Anaphylaxis        Past Surgical History:     Past Surgical History:   Procedure Laterality Date   • BLADDER SURGERY      sling x 2   •  SECTION      x1   • CHOLECYSTECTOMY     • COLONOSCOPY     • ENDOSCOPY     • INTERSTIM REMOVAL N/A 10/23/2019    Procedure: INTERSTIM REMOVAL;   Surgeon: Jose Adams MD;  Location: [facility];  Service: Urology   • SKIN BIOPSY     • TONSILLECTOMY AND ADENOIDECTOMY     • TUBAL ABDOMINAL LIGATION           Social History:     Social History     Socioeconomic History   • Marital status:      Spouse name: Not on file   • Number of children: Not on file   • Years of education: Not on file   • Highest education level: Not on file   Tobacco Use   • Smoking status: Current Every Day Smoker     Packs/day: 0.50     Years: 30.00     Pack years: 15.00     Types: Cigarettes   • Smokeless tobacco: Never Used   Substance and Sexual Activity   • Alcohol use: No   • Drug use: No   • Sexual activity: Defer       Family History:     Family History   Problem Relation Age of Onset   • Cancer Mother         renal cell carconma   • Diabetes Father    • Hypertension Father        Review of Systems:     Review of Systems   Constitutional: Negative.  Negative for activity change, appetite change, chills, diaphoresis, fatigue and unexpected weight change.   HENT: Negative for congestion, dental problem, drooling, ear discharge, ear pain, facial swelling, hearing loss, mouth sores, nosebleeds, postnasal drip, rhinorrhea, sinus pressure, sneezing, sore throat, tinnitus, trouble swallowing and voice change.    Eyes: Negative.  Negative for photophobia, pain, discharge, redness, itching and visual disturbance.   Respiratory: Negative.  Negative for apnea, cough, choking, chest tightness, shortness of breath, wheezing and stridor.    Cardiovascular: Negative.  Negative for chest pain, palpitations and leg swelling.   Gastrointestinal: Negative.  Negative for abdominal distention, abdominal pain, anal bleeding, blood in stool, constipation, diarrhea, nausea, rectal pain and vomiting.   Endocrine: Negative.  Negative for cold intolerance, heat intolerance, polydipsia, polyphagia and polyuria.   Genitourinary: Positive for frequency and urgency.   Musculoskeletal: Negative.   Negative for arthralgias, back pain, gait problem, joint swelling, myalgias, neck pain and neck stiffness.   Skin: Negative.  Negative for color change, pallor, rash and wound.   Allergic/Immunologic: Negative.  Negative for environmental allergies, food allergies and immunocompromised state.   Neurological: Negative.  Negative for dizziness, tremors, seizures, syncope, facial asymmetry, speech difficulty, weakness, light-headedness, numbness and headaches.   Hematological: Negative.  Negative for adenopathy. Does not bruise/bleed easily.   Psychiatric/Behavioral: Negative for agitation, behavioral problems, confusion, decreased concentration, dysphoric mood, hallucinations, self-injury, sleep disturbance and suicidal ideas. The patient is not nervous/anxious and is not hyperactive.    All other systems reviewed and are negative.      Physical Exam:     Physical Exam  Constitutional:       Appearance: She is well-developed.   HENT:      Head: Normocephalic and atraumatic.      Right Ear: External ear normal.      Left Ear: External ear normal.   Eyes:      Conjunctiva/sclera: Conjunctivae normal.      Pupils: Pupils are equal, round, and reactive to light.   Cardiovascular:      Rate and Rhythm: Normal rate and regular rhythm.      Heart sounds: Normal heart sounds.   Pulmonary:      Effort: Pulmonary effort is normal.      Breath sounds: Normal breath sounds.   Abdominal:      General: Bowel sounds are normal. There is no distension.      Palpations: Abdomen is soft. There is no mass.      Tenderness: There is no abdominal tenderness. There is no guarding or rebound.   Genitourinary:     Vagina: No vaginal discharge.   Musculoskeletal: Normal range of motion.   Skin:     General: Skin is warm and dry.   Neurological:      Mental Status: She is alert.      Deep Tendon Reflexes: Reflexes are normal and symmetric.   Psychiatric:         Behavior: Behavior normal.         Thought Content: Thought content normal.          Judgment: Judgment normal.         I have reviewed the following portions of the patient's history: allergies, current medications, past family history, past medical history, past social history, past surgical history, problem list and ROS and confirm it's accurate.      Procedure:       Assessment/Plan:   Recurrent urinary tract infections-patient has been referred and diagnosed with recurrent urinary tract infections.  We discussed the types of organisms that are found in the urinary tract indicating that the vast majority are results of the patient's own gastrointestinal jose angel.  We discussed how many of the antibiotics that are utilized can actually exacerbate these infections by creating resistant organisms and there is only a very few antibiotics that are concentrated in the urine and do not affect the rectal reservoir nor cause recurrent yeast vaginitis.  We discussed the risk factors for recurrent infections being intercourse in younger patients and atrophic changes in older patients.  We discussed the symptoms that are found including pain, pressure, burning, frequency, urgency suprapubic pain and painful intercourse.  I discussed upper tract symptoms including fevers, chills, and indicated the workup would be much more aggressive if the patient were to present with recurrent infections in the face of upper tract symptomatology such as fever.  I discussed the history of vesicoureteral reflux in young patients and finally chronic renal scarring as a result of such.  I recommend concomitant probiotics with treatment with antibiotics to protect the rectal reservoir including over-the-counter yogurt preparations to shannan oral pills containing the appropriate probiotics.  She has a culture pending him get empirically treated with Macrobid  Botox-patient wishes to proceed with a Botox injection in the bladder.  We discussed the efficacy of this treatment.  We discussed the effects on voiding.  I explained that  there is an approximate 6% risk of retention with 100 units of Botox injected in the posterior bladder in a submucosal fashion.  We talked about the need for intermittent clean catheterization if urinary retention were to follow.  We discussed the fact that it lasts up to 6 months.  The, we discussed the increasing risk of retention with higher doses.  We discussed the efficacy of this treatment and neurogenic bladders particularly detrusor hyperreflexia and reviewed the appropriate literature in this regard.  Patient wishes to proceed.      Patient reports that she is  currently experiencing any symptoms of urinary incontinence.      Patient's Body mass index is 36.18 kg/m². BMI is above normal parameters. Recommendations include: educational material.              This document has been electronically signed by ANKUSH LEVY MD February 22, 2021 15:36 EST

## 2021-02-24 LAB — BACTERIA SPEC AEROBE CULT: ABNORMAL

## 2021-02-25 PROBLEM — R31.9 URINARY TRACT INFECTION WITH HEMATURIA: Status: ACTIVE | Noted: 2021-02-25

## 2021-02-25 PROBLEM — N39.0 URINARY TRACT INFECTION WITH HEMATURIA: Status: ACTIVE | Noted: 2021-02-25

## 2021-02-25 PROBLEM — N32.81 DETRUSOR HYPERREFLEXIA: Status: ACTIVE | Noted: 2021-02-25

## 2021-02-25 RX ORDER — GENTAMICIN SULFATE 80 MG/100ML
80 INJECTION, SOLUTION INTRAVENOUS ONCE
Status: CANCELLED | OUTPATIENT
Start: 2021-03-17 | End: 2021-02-25

## 2021-03-03 DIAGNOSIS — N39.41 URGE INCONTINENCE: Primary | ICD-10-CM

## 2021-03-15 ENCOUNTER — LAB (OUTPATIENT)
Dept: LAB | Facility: HOSPITAL | Age: 51
End: 2021-03-15

## 2021-03-15 DIAGNOSIS — N39.41 URGE INCONTINENCE: ICD-10-CM

## 2021-03-16 DIAGNOSIS — N39.41 URGE INCONTINENCE: Primary | ICD-10-CM

## 2021-05-07 ENCOUNTER — APPOINTMENT (OUTPATIENT)
Dept: CT IMAGING | Facility: HOSPITAL | Age: 51
End: 2021-05-07

## 2021-05-07 ENCOUNTER — HOSPITAL ENCOUNTER (EMERGENCY)
Facility: HOSPITAL | Age: 51
Discharge: HOME | End: 2021-05-07
Attending: FAMILY MEDICINE | Admitting: FAMILY MEDICINE

## 2021-05-07 VITALS
OXYGEN SATURATION: 98 % | BODY MASS INDEX: 40.81 KG/M2 | HEIGHT: 67 IN | TEMPERATURE: 98.7 F | WEIGHT: 260 LBS | DIASTOLIC BLOOD PRESSURE: 84 MMHG | SYSTOLIC BLOOD PRESSURE: 140 MMHG | HEART RATE: 80 BPM | RESPIRATION RATE: 16 BRPM

## 2021-05-07 DIAGNOSIS — N30.01 ACUTE CYSTITIS WITH HEMATURIA: Primary | ICD-10-CM

## 2021-05-07 LAB
ALBUMIN SERPL-MCNC: 4.31 G/DL (ref 3.5–5.2)
ALBUMIN/GLOB SERPL: 1.4 G/DL
ALP SERPL-CCNC: 102 U/L (ref 39–117)
ALT SERPL W P-5'-P-CCNC: 6 U/L (ref 1–33)
ANION GAP SERPL CALCULATED.3IONS-SCNC: 10.5 MMOL/L (ref 5–15)
AST SERPL-CCNC: 17 U/L (ref 1–32)
BACTERIA UR QL AUTO: ABNORMAL /HPF
BASOPHILS # BLD AUTO: 0.13 10*3/MM3 (ref 0–0.2)
BASOPHILS NFR BLD AUTO: 1 % (ref 0–1.5)
BILIRUB SERPL-MCNC: 0.3 MG/DL (ref 0–1.2)
BILIRUB UR QL STRIP: NEGATIVE
BUN SERPL-MCNC: 12 MG/DL (ref 6–20)
BUN/CREAT SERPL: 10.5 (ref 7–25)
CALCIUM SPEC-SCNC: 9.7 MG/DL (ref 8.6–10.5)
CHLORIDE SERPL-SCNC: 107 MMOL/L (ref 98–107)
CLARITY UR: CLEAR
CO2 SERPL-SCNC: 24.5 MMOL/L (ref 22–29)
COLOR UR: YELLOW
CREAT SERPL-MCNC: 1.14 MG/DL (ref 0.57–1)
CRP SERPL-MCNC: 0.76 MG/DL (ref 0–0.5)
D-LACTATE SERPL-SCNC: 0.8 MMOL/L (ref 0.5–2)
DEPRECATED RDW RBC AUTO: 43.3 FL (ref 37–54)
EOSINOPHIL # BLD AUTO: 0.62 10*3/MM3 (ref 0–0.4)
EOSINOPHIL NFR BLD AUTO: 4.9 % (ref 0.3–6.2)
ERYTHROCYTE [DISTWIDTH] IN BLOOD BY AUTOMATED COUNT: 12.6 % (ref 12.3–15.4)
GFR SERPL CREATININE-BSD FRML MDRD: 51 ML/MIN/1.73
GLOBULIN UR ELPH-MCNC: 3.2 GM/DL
GLUCOSE SERPL-MCNC: 87 MG/DL (ref 65–99)
GLUCOSE UR STRIP-MCNC: NEGATIVE MG/DL
HCG SERPL QL: NEGATIVE
HCT VFR BLD AUTO: 47.8 % (ref 34–46.6)
HGB BLD-MCNC: 14.6 G/DL (ref 12–15.9)
HGB UR QL STRIP.AUTO: ABNORMAL
HOLD SPECIMEN: NORMAL
HYALINE CASTS UR QL AUTO: ABNORMAL /LPF
IMM GRANULOCYTES # BLD AUTO: 0.09 10*3/MM3 (ref 0–0.05)
IMM GRANULOCYTES NFR BLD AUTO: 0.7 % (ref 0–0.5)
INR PPP: 0.91 (ref 0.9–1.1)
KETONES UR QL STRIP: NEGATIVE
LEUKOCYTE ESTERASE UR QL STRIP.AUTO: ABNORMAL
LIPASE SERPL-CCNC: 45 U/L (ref 13–60)
LYMPHOCYTES # BLD AUTO: 3.69 10*3/MM3 (ref 0.7–3.1)
LYMPHOCYTES NFR BLD AUTO: 29.1 % (ref 19.6–45.3)
MCH RBC QN AUTO: 28.4 PG (ref 26.6–33)
MCHC RBC AUTO-ENTMCNC: 30.5 G/DL (ref 31.5–35.7)
MCV RBC AUTO: 93 FL (ref 79–97)
MONOCYTES # BLD AUTO: 0.89 10*3/MM3 (ref 0.1–0.9)
MONOCYTES NFR BLD AUTO: 7 % (ref 5–12)
NEUTROPHILS NFR BLD AUTO: 57.3 % (ref 42.7–76)
NEUTROPHILS NFR BLD AUTO: 7.25 10*3/MM3 (ref 1.7–7)
NITRITE UR QL STRIP: NEGATIVE
NRBC BLD AUTO-RTO: 0 /100 WBC (ref 0–0.2)
PH UR STRIP.AUTO: 5.5 [PH] (ref 5–8)
PLATELET # BLD AUTO: 335 10*3/MM3 (ref 140–450)
PMV BLD AUTO: 9.7 FL (ref 6–12)
POTASSIUM SERPL-SCNC: 4.2 MMOL/L (ref 3.5–5.2)
PROT SERPL-MCNC: 7.5 G/DL (ref 6–8.5)
PROT UR QL STRIP: NEGATIVE
PROTHROMBIN TIME: 12.1 SECONDS (ref 11.9–14.1)
RBC # BLD AUTO: 5.14 10*6/MM3 (ref 3.77–5.28)
RBC # UR: ABNORMAL /HPF
REF LAB TEST METHOD: ABNORMAL
SODIUM SERPL-SCNC: 142 MMOL/L (ref 136–145)
SP GR UR STRIP: 1.01 (ref 1–1.03)
SQUAMOUS #/AREA URNS HPF: ABNORMAL /HPF
UROBILINOGEN UR QL STRIP: ABNORMAL
WBC # BLD AUTO: 12.67 10*3/MM3 (ref 3.4–10.8)
WBC UR QL AUTO: ABNORMAL /HPF
WHOLE BLOOD HOLD SPECIMEN: NORMAL
WHOLE BLOOD HOLD SPECIMEN: NORMAL

## 2021-05-07 PROCEDURE — 81001 URINALYSIS AUTO W/SCOPE: CPT | Performed by: FAMILY MEDICINE

## 2021-05-07 PROCEDURE — 25010000002 CEFTRIAXONE: Performed by: FAMILY MEDICINE

## 2021-05-07 PROCEDURE — 25010000002 ONDANSETRON PER 1 MG: Performed by: FAMILY MEDICINE

## 2021-05-07 PROCEDURE — 25010000002 MORPHINE PER 10 MG: Performed by: FAMILY MEDICINE

## 2021-05-07 PROCEDURE — 96365 THER/PROPH/DIAG IV INF INIT: CPT

## 2021-05-07 PROCEDURE — 84703 CHORIONIC GONADOTROPIN ASSAY: CPT | Performed by: FAMILY MEDICINE

## 2021-05-07 PROCEDURE — 99283 EMERGENCY DEPT VISIT LOW MDM: CPT

## 2021-05-07 PROCEDURE — 86140 C-REACTIVE PROTEIN: CPT | Performed by: FAMILY MEDICINE

## 2021-05-07 PROCEDURE — 80053 COMPREHEN METABOLIC PANEL: CPT | Performed by: FAMILY MEDICINE

## 2021-05-07 PROCEDURE — 96375 TX/PRO/DX INJ NEW DRUG ADDON: CPT

## 2021-05-07 PROCEDURE — 85610 PROTHROMBIN TIME: CPT | Performed by: FAMILY MEDICINE

## 2021-05-07 PROCEDURE — 85025 COMPLETE CBC W/AUTO DIFF WBC: CPT | Performed by: FAMILY MEDICINE

## 2021-05-07 PROCEDURE — 74176 CT ABD & PELVIS W/O CONTRAST: CPT

## 2021-05-07 PROCEDURE — 74176 CT ABD & PELVIS W/O CONTRAST: CPT | Performed by: RADIOLOGY

## 2021-05-07 PROCEDURE — 83690 ASSAY OF LIPASE: CPT | Performed by: FAMILY MEDICINE

## 2021-05-07 PROCEDURE — 83605 ASSAY OF LACTIC ACID: CPT | Performed by: FAMILY MEDICINE

## 2021-05-07 PROCEDURE — 25010000002 PROCHLORPERAZINE 10 MG/2ML SOLUTION: Performed by: FAMILY MEDICINE

## 2021-05-07 PROCEDURE — 87086 URINE CULTURE/COLONY COUNT: CPT | Performed by: FAMILY MEDICINE

## 2021-05-07 RX ORDER — PROMETHAZINE HYDROCHLORIDE 25 MG/1
25 TABLET ORAL EVERY 6 HOURS PRN
Qty: 10 TABLET | Refills: 0 | Status: SHIPPED | OUTPATIENT
Start: 2021-05-07 | End: 2021-05-20 | Stop reason: SDUPTHER

## 2021-05-07 RX ORDER — CEFDINIR 300 MG/1
300 CAPSULE ORAL 2 TIMES DAILY
Qty: 14 CAPSULE | Refills: 0 | Status: SHIPPED | OUTPATIENT
Start: 2021-05-07 | End: 2021-05-24

## 2021-05-07 RX ORDER — SODIUM CHLORIDE 0.9 % (FLUSH) 0.9 %
10 SYRINGE (ML) INJECTION AS NEEDED
Status: DISCONTINUED | OUTPATIENT
Start: 2021-05-07 | End: 2021-05-08 | Stop reason: HOSPADM

## 2021-05-07 RX ORDER — PROCHLORPERAZINE EDISYLATE 5 MG/ML
5 INJECTION INTRAMUSCULAR; INTRAVENOUS ONCE
Status: COMPLETED | OUTPATIENT
Start: 2021-05-07 | End: 2021-05-07

## 2021-05-07 RX ORDER — ONDANSETRON 2 MG/ML
4 INJECTION INTRAMUSCULAR; INTRAVENOUS ONCE
Status: COMPLETED | OUTPATIENT
Start: 2021-05-07 | End: 2021-05-07

## 2021-05-07 RX ORDER — MORPHINE SULFATE 2 MG/ML
2 INJECTION, SOLUTION INTRAMUSCULAR; INTRAVENOUS ONCE
Status: COMPLETED | OUTPATIENT
Start: 2021-05-07 | End: 2021-05-07

## 2021-05-07 RX ADMIN — SODIUM CHLORIDE, POTASSIUM CHLORIDE, SODIUM LACTATE AND CALCIUM CHLORIDE 1000 ML: 600; 310; 30; 20 INJECTION, SOLUTION INTRAVENOUS at 19:49

## 2021-05-07 RX ADMIN — CEFTRIAXONE 1 G: 1 INJECTION, POWDER, FOR SOLUTION INTRAMUSCULAR; INTRAVENOUS at 22:49

## 2021-05-07 RX ADMIN — MORPHINE SULFATE 2 MG: 2 INJECTION, SOLUTION INTRAMUSCULAR; INTRAVENOUS at 22:03

## 2021-05-07 RX ADMIN — ONDANSETRON 4 MG: 2 INJECTION INTRAMUSCULAR; INTRAVENOUS at 19:50

## 2021-05-07 RX ADMIN — PROCHLORPERAZINE EDISYLATE 5 MG: 5 INJECTION INTRAMUSCULAR; INTRAVENOUS at 22:07

## 2021-05-08 NOTE — ED NOTES
Dr garcia instructed to wait until Rochephin abx finished infusing before discharge.      Tahmina Ku, RN  05/07/21 [id number]

## 2021-05-09 LAB — BACTERIA SPEC AEROBE CULT: NORMAL

## 2021-05-18 ENCOUNTER — TRANSCRIBE ORDERS (OUTPATIENT)
Dept: ADMINISTRATIVE | Facility: HOSPITAL | Age: 51
End: 2021-05-18

## 2021-05-18 ENCOUNTER — HOSPITAL ENCOUNTER (OUTPATIENT)
Dept: GENERAL RADIOLOGY | Facility: HOSPITAL | Age: 51
Discharge: HOME | End: 2021-05-18
Admitting: PHYSICIAN ASSISTANT

## 2021-05-18 DIAGNOSIS — M54.50 LOW BACK PAIN, UNSPECIFIED BACK PAIN LATERALITY, UNSPECIFIED CHRONICITY, UNSPECIFIED WHETHER SCIATICA PRESENT: Primary | ICD-10-CM

## 2021-05-18 DIAGNOSIS — M54.50 LOW BACK PAIN, UNSPECIFIED BACK PAIN LATERALITY, UNSPECIFIED CHRONICITY, UNSPECIFIED WHETHER SCIATICA PRESENT: ICD-10-CM

## 2021-05-18 PROCEDURE — 72110 X-RAY EXAM L-2 SPINE 4/>VWS: CPT | Performed by: RADIOLOGY

## 2021-05-18 PROCEDURE — 72110 X-RAY EXAM L-2 SPINE 4/>VWS: CPT

## 2021-05-20 ENCOUNTER — OFFICE VISIT (OUTPATIENT)
Dept: UROLOGY | Facility: CLINIC | Age: 51
End: 2021-05-20

## 2021-05-20 ENCOUNTER — PREP FOR SURGERY (OUTPATIENT)
Dept: OTHER | Facility: HOSPITAL | Age: 51
End: 2021-05-20

## 2021-05-20 VITALS — TEMPERATURE: 98 F | WEIGHT: 261 LBS | BODY MASS INDEX: 40.97 KG/M2 | HEIGHT: 67 IN

## 2021-05-20 DIAGNOSIS — R10.9 RIGHT FLANK PAIN, CHRONIC: ICD-10-CM

## 2021-05-20 DIAGNOSIS — G89.29 RIGHT FLANK PAIN, CHRONIC: ICD-10-CM

## 2021-05-20 DIAGNOSIS — R31.29 MICROHEMATURIA: Primary | ICD-10-CM

## 2021-05-20 DIAGNOSIS — N20.1 RIGHT URETERAL STONE: ICD-10-CM

## 2021-05-20 DIAGNOSIS — N20.1 RIGHT URETERAL STONE: Primary | ICD-10-CM

## 2021-05-20 DIAGNOSIS — N30.01 ACUTE CYSTITIS WITH HEMATURIA: ICD-10-CM

## 2021-05-20 LAB
BILIRUB BLD-MCNC: NEGATIVE MG/DL
CLARITY, POC: CLEAR
COLOR UR: YELLOW
GLUCOSE UR STRIP-MCNC: NEGATIVE MG/DL
KETONES UR QL: NEGATIVE
LEUKOCYTE EST, POC: ABNORMAL
NITRITE UR-MCNC: NEGATIVE MG/ML
PH UR: 6 [PH] (ref 5–8)
PROT UR STRIP-MCNC: NEGATIVE MG/DL
RBC # UR STRIP: ABNORMAL /UL
SP GR UR: 1 (ref 1–1.03)
UROBILINOGEN UR QL: NORMAL

## 2021-05-20 PROCEDURE — 87086 URINE CULTURE/COLONY COUNT: CPT | Performed by: NURSE PRACTITIONER

## 2021-05-20 PROCEDURE — 99214 OFFICE O/P EST MOD 30 MIN: CPT | Performed by: NURSE PRACTITIONER

## 2021-05-20 RX ORDER — HYDROCODONE BITARTRATE AND ACETAMINOPHEN 10; 325 MG/1; MG/1
1 TABLET ORAL EVERY 6 HOURS PRN
Qty: 10 TABLET | Refills: 0 | Status: SHIPPED | OUTPATIENT
Start: 2021-05-20 | End: 2021-06-15 | Stop reason: SDUPTHER

## 2021-05-20 RX ORDER — GENTAMICIN SULFATE 80 MG/100ML
80 INJECTION, SOLUTION INTRAVENOUS ONCE
Status: CANCELLED | OUTPATIENT
Start: 2021-05-26 | End: 2021-05-20

## 2021-05-20 RX ORDER — PROMETHAZINE HYDROCHLORIDE 25 MG/1
25 TABLET ORAL EVERY 8 HOURS PRN
Qty: 20 TABLET | Refills: 0 | Status: SHIPPED | OUTPATIENT
Start: 2021-05-20 | End: 2021-10-11 | Stop reason: SDUPTHER

## 2021-05-20 RX ORDER — PROMETHAZINE HYDROCHLORIDE 25 MG/1
25 TABLET ORAL EVERY 8 HOURS PRN
Qty: 20 TABLET | Refills: 0 | Status: SHIPPED | OUTPATIENT
Start: 2021-05-20 | End: 2021-05-20 | Stop reason: SDUPTHER

## 2021-05-20 NOTE — PROGRESS NOTES
Chief Complaint  Right Ureteral stone/Right flank Pain (follow up Right Ureteral Stone/UTI)    Subjective          Briseyda Edouard presents to [facility]  History of Present Illness  Ms. Briseyda Edouard is a very pleasant but ill looking and very frail lady today who presents to the clinic for evaluation of kidney stones.  She reports she has been miserable with right flank pain, lower back pain, and urinary symptoms consistent with UTI.    Patient reports she initially presented to the ER on 05/07/20/2021 with 10/10 right flank pain, dysuria and generalized abdominal pain/discomfort.  She had a CT scan done that showed a 5 mm proximal ureteral stone, but unfortunately was missed by the radiologist.  She was treated for UTI and sent home on p.o. antibiotics which unfortunately did not improve her discomfort.    Patient returned to her PCPs office due to worsening flank pain and lower back discomfort.  She had an x-ray of her lumbar spine, with incidental finding of a  5 mm calcification that could be in line with the right ureter.  Also Mild multilevel degenerative disc disease and fairly moderate facet arthropathy.    On presentation to clinic today, she is in apparent discomfort.  She seen leaning over the exam table, dry heaving.  She reports right flank pain that is still very colicky, She has right lower quadrant pain, and reports pressure/discomfort still in her right lower abdomen and pelvic region, making it very difficult to find any comfortable position.  She reports urinary symptoms of frequency, urgency, but reports improvement since starting antibiotics.  Denies dysuria, burning on urination and gross hematuria.  She continues to have nausea with vomiting, she has had chills without fevers.  She has very poor appetite, has been unable to eat.  Patient is requesting hospitalization for IV fluid.    HER Urine dipstick today showed 1+ leukocyte esterase, negative  "nitrites, 3+ microscopic hematuria.  She is currently on antibiotics cefdinir was given in the ER, she has a significant past medical history as listed below.    Objective   Vital Signs:   Temp 98 °F (36.7 °C)   Ht 170.2 cm (67\")   Wt 118 kg (261 lb)   BMI 40.88 kg/m²     Physical Exam  Constitutional:       General: She is in acute distress.      Appearance: She is well-developed. She is obese. She is ill-appearing.   HENT:      Head: Normocephalic and atraumatic.   Eyes:      Pupils: Pupils are equal, round, and reactive to light.   Neck:      Thyroid: No thyromegaly.      Trachea: No tracheal deviation.   Cardiovascular:      Rate and Rhythm: Normal rate and regular rhythm.      Heart sounds: No murmur heard.     Pulmonary:      Effort: Pulmonary effort is normal. No respiratory distress.      Breath sounds: Normal breath sounds. No stridor. No wheezing.   Abdominal:      General: Bowel sounds are normal. There is distension.      Palpations: Abdomen is soft.      Tenderness: There is abdominal tenderness. There is right CVA tenderness and guarding.   Genitourinary:     Labia:         Right: No tenderness.         Left: No tenderness.       Vagina: Normal. No vaginal discharge.   Musculoskeletal:         General: Tenderness ( RIGHT FLANK PAIN/LOWER ACK) present. No deformity. Normal range of motion.      Cervical back: Normal range of motion.   Skin:     General: Skin is warm and dry.      Capillary Refill: Capillary refill takes less than 2 seconds.      Coloration: Skin is pale.      Findings: No erythema or rash.   Neurological:      Mental Status: She is alert and oriented to person, place, and time.      Cranial Nerves: No cranial nerve deficit.      Sensory: No sensory deficit.      Coordination: Coordination normal.   Psychiatric:         Behavior: Behavior normal.         Thought Content: Thought content normal.         Judgment: Judgment normal.        Result Review :     CMP    CMP 5/7/21   Glucose " 87   BUN 12   Creatinine 1.14 (A)   eGFR Non African Am 51 (A)   Sodium 142   Potassium 4.2   Chloride 107   Calcium 9.7   Albumin 4.31   Total Bilirubin 0.3   Alkaline Phosphatase 102   AST (SGOT) 17   ALT (SGPT) 6   (A) Abnormal value            CBC w/diff    CBC w/Diff 5/7/21   WBC 12.67 (A)   RBC 5.14   Hemoglobin 14.6   Hematocrit 47.8 (A)   MCV 93.0   MCH 28.4   MCHC 30.5 (A)   RDW 12.6   Platelets 335   Neutrophil Rel % 57.3   Immature Granulocyte Rel % 0.7 (A)   Lymphocyte Rel % 29.1   Monocyte Rel % 7.0   Eosinophil Rel % 4.9   Basophil Rel % 1.0   (A) Abnormal value            UA    Urinalysis 2/22/21 5/7/21 5/7/21 5/20/21     2117 2117    Squamous Epithelial Cells, UA   0-2    Specific Gravity, UA  1.008     Ketones, UA Negative Negative  Negative   Blood, UA  Moderate (2+) (A)     Leukocytes, UA Large (3+) (A) Small (1+) (A)  Small (1+) (A)   Nitrite, UA  Negative     RBC, UA   21-30 (A)    WBC, UA   6-12 (A)    Bacteria, UA   1+ (A)    (A) Abnormal value            Urine Culture    Urine Culture 8/6/20 2/22/21 5/7/21   Urine Culture Final report (A) >100,000 CFU/mL Klebsiella pneumoniae ssp pneumoniae (A) >100,000 CFU/mL Mixed Belkis Isolated   (A) Abnormal value            Data reviewed: Radiologic studies CT abdomen and pelvis done 05/07/2021, x-ray spine lumbar complete done 05/18/2021.-Showing 5 mm right UPJ stone          Assessment and Plan    Diagnoses and all orders for this visit:    1. Microhematuria (Primary)  -     POC Urinalysis Dipstick, Automated  -     Urine Culture - Urine, Urine, Clean Catch  -     HYDROcodone-acetaminophen (Norco)  MG per tablet; Take 1 tablet by mouth Every 6 (Six) Hours As Needed for Moderate Pain .  Dispense: 10 tablet; Refill: 0    2. Right ureteral stone  -     Discontinue: promethazine (PHENERGAN) 25 MG tablet; Take 1 tablet by mouth Every 8 (Eight) Hours As Needed for Nausea or Vomiting.  Dispense: 20 tablet; Refill: 0  -     promethazine (PHENERGAN) 25 MG  tablet; Take 1 tablet by mouth Every 8 (Eight) Hours As Needed for Nausea or Vomiting.  Dispense: 20 tablet; Refill: 0    3. Right flank pain, chronic  -     Discontinue: promethazine (PHENERGAN) 25 MG tablet; Take 1 tablet by mouth Every 8 (Eight) Hours As Needed for Nausea or Vomiting.  Dispense: 20 tablet; Refill: 0  -     promethazine (PHENERGAN) 25 MG tablet; Take 1 tablet by mouth Every 8 (Eight) Hours As Needed for Nausea or Vomiting.  Dispense: 20 tablet; Refill: 0    Right Renal Calculus/RIGHT 5MM UPJ STONE/UTI: The Patient has been diagnosed with a right renal calculus. S He presents with right colicky pain that has been intermittent but now constant, occasionally sharp with pelvic pressure and discomfort ongoing for 2 weeks.     We have discussed the various parameters regarding spontaneous passage including the notion that a 2-3 mm stone has a high likelihood of spontaneous passage versus a larger stone of > 5mm like he She has  being caught up in the upper areas of the urinary tract.    We also discussed the medical management of stone disease and the use of medical expulsive therapy in the form of Flomax. This is used in an off label setting. I also talked about nonoperative management including ambulation and increasing fluids and hot tub as being an effective adjuncts in the treatment of a stone.     We discussed the absolute relative indicators for intervention including the presence of sepsis, and pain we cannot control as the primary need for urgent intervention.  We discussed placement of a stent if indicated and the management of the stent as well.     I Discussed this case with Dr Kinney who is agreeable with her plan of care.     The patient has been scheduled for Right Ureteroscopy Laser Lithotripsy WITH Possible Right Stent on Wednesday, 05/26/2021.    She has been given a refill of pain medication just in case and Nausea medication and Flomax for medical expulsive therapy.    Follow-up  with patient after procedure,    Narcotic pain medication-The Patient has significant acute pain that I believe would be an indication for the use of narcotic pain medication.  I discussed the significant risks of pain medication and the fact that this will be a short only option and I will give her no more than a three-day supply of pain medication.  And I will not plan long-term medication and that  PATIENT will be sent to a pain clinic if at all becomes necessary.      We discussed signing a pain medication agreement and the fact that we're going to run a state SHANNAN review to be sure the patient is not getting pain medication from elsewhere.  If this is the case we will not give pain medication.  As part of the patient's treatment plan if they are being prescribed a controlled substance with potential for abuse.      This patient has been made aware of the appropriate dose of such medications including, the risk for somnolence, limited ability to drive and/or safety and the significant potential for overdose.  It has been made clear that these medications are for the prescribed patient only without concomitant use of alcohol or other CONTROLLED substances unless prescribed by the medical provider.  THE PATIENT Has completed prescribing agreement detailing the terms TO continue prescribing him a controlled substance,  Including monitoring SHANNAN REPORTS, the possibility of urine drug screens and PILL Counts. The patient is aware that we review SHANNAN reports on a regular basis and scan them into the chart.  History and physical examination PORTRAYED continued safe and appropriate use of controlled substances.    FINALLY, Also discussed the fact that the new [place] legislation allows only a THREE-DAY prescription for pain medication.  In this situation he will be referred to a chronic pain clinic if need be .    THE PATIENT IS AGREEABLE WITH PLAN OF CARE.      Follow Up   No follow-ups on file.     Patient was  given instructions and counseling regarding her condition or for health maintenance advice. Please see specific information pulled into the AVS if appropriate.

## 2021-05-21 ENCOUNTER — TELEPHONE (OUTPATIENT)
Dept: GASTROENTEROLOGY | Facility: CLINIC | Age: 51
End: 2021-05-21

## 2021-05-21 DIAGNOSIS — N20.1 RIGHT URETERAL STONE: Primary | ICD-10-CM

## 2021-05-21 LAB — BACTERIA SPEC AEROBE CULT: NORMAL

## 2021-05-21 NOTE — TELEPHONE ENCOUNTER
Spoke with patient and let her know that her urine culture came back normal. She voiced understanding.

## 2021-05-24 ENCOUNTER — PRE-ADMISSION TESTING (OUTPATIENT)
Dept: PREADMISSION TESTING | Facility: HOSPITAL | Age: 51
End: 2021-05-24

## 2021-05-24 ENCOUNTER — LAB (OUTPATIENT)
Dept: LAB | Facility: HOSPITAL | Age: 51
End: 2021-05-24

## 2021-05-24 ENCOUNTER — TELEPHONE (OUTPATIENT)
Dept: GASTROENTEROLOGY | Facility: CLINIC | Age: 51
End: 2021-05-24

## 2021-05-24 DIAGNOSIS — N20.1 RIGHT URETERAL STONE: ICD-10-CM

## 2021-05-24 LAB
QT INTERVAL: 346 MS
QTC INTERVAL: 450 MS

## 2021-05-24 PROCEDURE — C9803 HOPD COVID-19 SPEC COLLECT: HCPCS

## 2021-05-24 PROCEDURE — 93005 ELECTROCARDIOGRAM TRACING: CPT

## 2021-05-24 PROCEDURE — U0004 COV-19 TEST NON-CDC HGH THRU: HCPCS

## 2021-05-24 NOTE — DISCHARGE INSTRUCTIONS

## 2021-05-26 ENCOUNTER — HOSPITAL ENCOUNTER (OUTPATIENT)
Facility: HOSPITAL | Age: 51
Setting detail: HOSPITAL OUTPATIENT SURGERY
Discharge: HOME | End: 2021-05-26
Attending: UROLOGY | Admitting: UROLOGY

## 2021-05-26 ENCOUNTER — APPOINTMENT (OUTPATIENT)
Dept: GENERAL RADIOLOGY | Facility: HOSPITAL | Age: 51
End: 2021-05-26

## 2021-05-26 ENCOUNTER — ANESTHESIA (OUTPATIENT)
Dept: PERIOP | Facility: HOSPITAL | Age: 51
End: 2021-05-26

## 2021-05-26 ENCOUNTER — ANESTHESIA EVENT (OUTPATIENT)
Dept: PERIOP | Facility: HOSPITAL | Age: 51
End: 2021-05-26

## 2021-05-26 VITALS
SYSTOLIC BLOOD PRESSURE: 128 MMHG | RESPIRATION RATE: 17 BRPM | DIASTOLIC BLOOD PRESSURE: 86 MMHG | HEIGHT: 67 IN | HEART RATE: 73 BPM | TEMPERATURE: 98.6 F | BODY MASS INDEX: 39.71 KG/M2 | OXYGEN SATURATION: 98 % | WEIGHT: 253 LBS

## 2021-05-26 DIAGNOSIS — R10.9 RIGHT FLANK PAIN, CHRONIC: ICD-10-CM

## 2021-05-26 DIAGNOSIS — G89.29 RIGHT FLANK PAIN, CHRONIC: ICD-10-CM

## 2021-05-26 DIAGNOSIS — N20.1 RIGHT URETERAL STONE: ICD-10-CM

## 2021-05-26 DIAGNOSIS — N30.01 ACUTE CYSTITIS WITH HEMATURIA: ICD-10-CM

## 2021-05-26 LAB
B-HCG UR QL: NEGATIVE
INTERNAL NEGATIVE CONTROL: NEGATIVE
INTERNAL POSITIVE CONTROL: POSITIVE
Lab: NORMAL
SARS-COV-2 RNA NOSE QL NAA+PROBE: NOT DETECTED
SARS-COV-2 RNA RESP QL NAA+PROBE: NOT DETECTED

## 2021-05-26 PROCEDURE — 81025 URINE PREGNANCY TEST: CPT | Performed by: ANESTHESIOLOGY

## 2021-05-26 PROCEDURE — 25010000002 IOPAMIDOL 61 % SOLUTION

## 2021-05-26 PROCEDURE — 25010000002 GENTAMICIN PER 80 MG: Performed by: NURSE PRACTITIONER

## 2021-05-26 PROCEDURE — 76000 FLUOROSCOPY <1 HR PHYS/QHP: CPT

## 2021-05-26 PROCEDURE — 25010000002 FENTANYL CITRATE (PF) 50 MCG/ML SOLUTION: Performed by: NURSE ANESTHETIST, CERTIFIED REGISTERED

## 2021-05-26 PROCEDURE — 25010000002 MIDAZOLAM PER 1 MG: Performed by: NURSE ANESTHETIST, CERTIFIED REGISTERED

## 2021-05-26 PROCEDURE — 76000 FLUOROSCOPY <1 HR PHYS/QHP: CPT | Performed by: RADIOLOGY

## 2021-05-26 PROCEDURE — 52353 CYSTOURETERO W/LITHOTRIPSY: CPT | Performed by: UROLOGY

## 2021-05-26 RX ORDER — ATROPA BELLADONNA AND OPIUM 16.2; 3 MG/1; MG/1
SUPPOSITORY RECTAL AS NEEDED
Status: DISCONTINUED | OUTPATIENT
Start: 2021-05-26 | End: 2021-05-26 | Stop reason: HOSPADM

## 2021-05-26 RX ORDER — FENTANYL CITRATE 50 UG/ML
INJECTION, SOLUTION INTRAMUSCULAR; INTRAVENOUS AS NEEDED
Status: DISCONTINUED | OUTPATIENT
Start: 2021-05-26 | End: 2021-05-26 | Stop reason: SURG

## 2021-05-26 RX ORDER — IPRATROPIUM BROMIDE AND ALBUTEROL SULFATE 2.5; .5 MG/3ML; MG/3ML
3 SOLUTION RESPIRATORY (INHALATION) ONCE AS NEEDED
Status: DISCONTINUED | OUTPATIENT
Start: 2021-05-26 | End: 2021-05-26 | Stop reason: HOSPADM

## 2021-05-26 RX ORDER — MIDAZOLAM HYDROCHLORIDE 1 MG/ML
1 INJECTION INTRAMUSCULAR; INTRAVENOUS
Status: DISCONTINUED | OUTPATIENT
Start: 2021-05-26 | End: 2021-05-26 | Stop reason: HOSPADM

## 2021-05-26 RX ORDER — GENTAMICIN SULFATE 80 MG/100ML
80 INJECTION, SOLUTION INTRAVENOUS ONCE
Status: COMPLETED | OUTPATIENT
Start: 2021-05-26 | End: 2021-05-26

## 2021-05-26 RX ORDER — SODIUM CHLORIDE, SODIUM LACTATE, POTASSIUM CHLORIDE, CALCIUM CHLORIDE 600; 310; 30; 20 MG/100ML; MG/100ML; MG/100ML; MG/100ML
INJECTION, SOLUTION INTRAVENOUS CONTINUOUS PRN
Status: DISCONTINUED | OUTPATIENT
Start: 2021-05-26 | End: 2021-05-26 | Stop reason: SURG

## 2021-05-26 RX ORDER — FENTANYL CITRATE 50 UG/ML
50 INJECTION, SOLUTION INTRAMUSCULAR; INTRAVENOUS
Status: DISCONTINUED | OUTPATIENT
Start: 2021-05-26 | End: 2021-05-26 | Stop reason: HOSPADM

## 2021-05-26 RX ORDER — ONDANSETRON 2 MG/ML
4 INJECTION INTRAMUSCULAR; INTRAVENOUS AS NEEDED
Status: DISCONTINUED | OUTPATIENT
Start: 2021-05-26 | End: 2021-05-26 | Stop reason: HOSPADM

## 2021-05-26 RX ORDER — SODIUM CHLORIDE 0.9 % (FLUSH) 0.9 %
10 SYRINGE (ML) INJECTION EVERY 12 HOURS SCHEDULED
Status: DISCONTINUED | OUTPATIENT
Start: 2021-05-26 | End: 2021-05-26 | Stop reason: HOSPADM

## 2021-05-26 RX ORDER — HYDROCODONE BITARTRATE AND ACETAMINOPHEN 10; 325 MG/1; MG/1
1 TABLET ORAL EVERY 4 HOURS PRN
Qty: 12 TABLET | Refills: 0 | Status: SHIPPED | OUTPATIENT
Start: 2021-05-26 | End: 2022-01-11

## 2021-05-26 RX ORDER — SODIUM CHLORIDE 0.9 % (FLUSH) 0.9 %
10 SYRINGE (ML) INJECTION AS NEEDED
Status: DISCONTINUED | OUTPATIENT
Start: 2021-05-26 | End: 2021-05-26 | Stop reason: HOSPADM

## 2021-05-26 RX ORDER — OXYCODONE HYDROCHLORIDE AND ACETAMINOPHEN 5; 325 MG/1; MG/1
1 TABLET ORAL ONCE AS NEEDED
Status: DISCONTINUED | OUTPATIENT
Start: 2021-05-26 | End: 2021-05-26 | Stop reason: HOSPADM

## 2021-05-26 RX ORDER — SODIUM CHLORIDE, SODIUM LACTATE, POTASSIUM CHLORIDE, CALCIUM CHLORIDE 600; 310; 30; 20 MG/100ML; MG/100ML; MG/100ML; MG/100ML
125 INJECTION, SOLUTION INTRAVENOUS ONCE
Status: COMPLETED | OUTPATIENT
Start: 2021-05-26 | End: 2021-05-26

## 2021-05-26 RX ORDER — MEPERIDINE HYDROCHLORIDE 25 MG/ML
12.5 INJECTION INTRAMUSCULAR; INTRAVENOUS; SUBCUTANEOUS
Status: DISCONTINUED | OUTPATIENT
Start: 2021-05-26 | End: 2021-05-26 | Stop reason: HOSPADM

## 2021-05-26 RX ORDER — MIDAZOLAM HYDROCHLORIDE 1 MG/ML
INJECTION INTRAMUSCULAR; INTRAVENOUS AS NEEDED
Status: DISCONTINUED | OUTPATIENT
Start: 2021-05-26 | End: 2021-05-26 | Stop reason: SURG

## 2021-05-26 RX ORDER — MAGNESIUM HYDROXIDE 1200 MG/15ML
LIQUID ORAL AS NEEDED
Status: DISCONTINUED | OUTPATIENT
Start: 2021-05-26 | End: 2021-05-26 | Stop reason: HOSPADM

## 2021-05-26 RX ADMIN — GENTAMICIN SULFATE 80 MG: 80 INJECTION, SOLUTION INTRAVENOUS at 10:23

## 2021-05-26 RX ADMIN — FENTANYL CITRATE 100 MCG: 50 INJECTION INTRAMUSCULAR; INTRAVENOUS at 10:13

## 2021-05-26 RX ADMIN — FENTANYL CITRATE 100 MCG: 50 INJECTION INTRAMUSCULAR; INTRAVENOUS at 10:24

## 2021-05-26 RX ADMIN — SODIUM CHLORIDE, POTASSIUM CHLORIDE, SODIUM LACTATE AND CALCIUM CHLORIDE 125 ML/HR: 600; 310; 30; 20 INJECTION, SOLUTION INTRAVENOUS at 09:57

## 2021-05-26 RX ADMIN — SODIUM CHLORIDE, POTASSIUM CHLORIDE, SODIUM LACTATE AND CALCIUM CHLORIDE: 600; 310; 30; 20 INJECTION, SOLUTION INTRAVENOUS at 10:13

## 2021-05-26 RX ADMIN — MIDAZOLAM 2 MG: 1 INJECTION INTRAMUSCULAR; INTRAVENOUS at 10:13

## 2021-05-26 NOTE — ANESTHESIA POSTPROCEDURE EVALUATION
Patient: Briseyda Edouard    Procedure Summary     Date: 05/26/21 Room / Location: [facility] OR 06 /  [facility] OR    Anesthesia Start: 1013 Anesthesia Stop: 1032    Procedure: CYSTOSCOPY URETEROSCOPY RETROGRADE RIGHT, URETEROGRAM (Right ) Diagnosis:       Right ureteral stone      Right flank pain, chronic      Acute cystitis with hematuria      (Right ureteral stone [N20.1])      (Right flank pain, chronic [R10.9, G89.29])      (Acute cystitis with hematuria [N30.01])    Surgeons: Steven Wayne MD Provider: Bud Pringle MD    Anesthesia Type: MAC ASA Status: 3          Anesthesia Type: MAC    Vitals  Vitals Value Taken Time   /87 05/26/21 1100   Temp 98.2 °F (36.8 °C) 05/26/21 1032   Pulse 75 05/26/21 1100   Resp 13 05/26/21 1100   SpO2 98 % 05/26/21 1100           Post Anesthesia Care and Evaluation    Patient location during evaluation: PHASE II  Patient participation: complete - patient participated  Level of consciousness: awake and alert  Pain score: 1  Pain management: adequate  Airway patency: patent  Anesthetic complications: No anesthetic complications  PONV Status: controlled  Cardiovascular status: acceptable  Respiratory status: acceptable  Hydration status: acceptable

## 2021-05-26 NOTE — ANESTHESIA PROCEDURE NOTES
Airway  Urgency: elective    Date/Time: 5/26/2021 10:15 AM  Airway not difficult    General Information and Staff    Patient location during procedure: OR  CRNA: Lina Pierre CRNA    Indications and Patient Condition  Indications for airway management: airway protection    Preoxygenated: yes      Final Airway Details  Final airway type: supraglottic airway      Successful airway: unique  Size 4    Number of attempts at approach: 1  Assessment: lips, teeth, and gum same as pre-op

## 2021-05-26 NOTE — ANESTHESIA POSTPROCEDURE EVALUATION
Patient: Briseyda Edouard    Procedure Summary     Date: 05/26/21 Room / Location: [facility] OR 06 /  [facility] OR    Anesthesia Start: 1013 Anesthesia Stop: 1032    Procedure: CYSTOSCOPY URETEROSCOPY RETROGRADE RIGHT, URETEROGRAM (Right ) Diagnosis:       Right ureteral stone      Right flank pain, chronic      Acute cystitis with hematuria      (Right ureteral stone [N20.1])      (Right flank pain, chronic [R10.9, G89.29])      (Acute cystitis with hematuria [N30.01])    Surgeons: Steven Wayne MD Provider: Bud Pringle MD    Anesthesia Type: MAC ASA Status: 3          Anesthesia Type: MAC    Vitals  Vitals Value Taken Time   /87 05/26/21 1100   Temp 98.2 °F (36.8 °C) 05/26/21 1032   Pulse 75 05/26/21 1100   Resp 13 05/26/21 1100   SpO2 98 % 05/26/21 1100           Post Anesthesia Care and Evaluation    Patient location during evaluation: PHASE II  Patient participation: complete - patient participated  Level of consciousness: awake and alert  Pain score: 1  Pain management: adequate  Airway patency: patent  Anesthetic complications: No anesthetic complications  PONV Status: controlled  Cardiovascular status: acceptable  Respiratory status: acceptable  Hydration status: acceptable

## 2021-05-26 NOTE — ANESTHESIA PREPROCEDURE EVALUATION
Anesthesia Evaluation     Patient summary reviewed and Nursing notes reviewed   no history of anesthetic complications:  NPO Solid Status: > 8 hours  NPO Liquid Status: > 8 hours           Airway   Mallampati: II  TM distance: >3 FB  Neck ROM: full  No difficulty expected  Dental - normal exam     Pulmonary - normal exam   (+) pneumonia (4-5 years ago ) resolved , a smoker Current Abstained day of surgery,   Cardiovascular - normal exam  Exercise tolerance: good (4-7 METS)    (+) hypertension,       Neuro/Psych  (+) psychiatric history Anxiety and Depression,     GI/Hepatic/Renal/Endo    (+) obesity,  hiatal hernia,  renal disease stones,     Musculoskeletal (-) negative ROS    Abdominal    Substance History - negative use     OB/GYN    (-)  Pregnant    Comment: Urine pregnancy negative       Other      history of cancer (melanoma ) remission                      Anesthesia Plan    ASA 3     MAC     intravenous induction     Anesthetic plan, all risks, benefits, and alternatives have been provided, discussed and informed consent has been obtained with: patient.

## 2021-06-15 ENCOUNTER — OFFICE VISIT (OUTPATIENT)
Dept: UROLOGY | Facility: CLINIC | Age: 51
End: 2021-06-15

## 2021-06-15 VITALS — HEIGHT: 67 IN | WEIGHT: 257 LBS | BODY MASS INDEX: 40.34 KG/M2

## 2021-06-15 DIAGNOSIS — R31.29 MICROHEMATURIA: ICD-10-CM

## 2021-06-15 DIAGNOSIS — R30.0 DYSURIA: ICD-10-CM

## 2021-06-15 DIAGNOSIS — N20.1 RIGHT URETERAL STONE: Primary | ICD-10-CM

## 2021-06-15 DIAGNOSIS — Z87.440 HISTORY OF RECURRENT UTIS: ICD-10-CM

## 2021-06-15 DIAGNOSIS — R35.0 FREQUENCY OF MICTURITION: ICD-10-CM

## 2021-06-15 LAB
BILIRUB BLD-MCNC: NEGATIVE MG/DL
CLARITY, POC: CLEAR
COLOR UR: YELLOW
GLUCOSE UR STRIP-MCNC: NEGATIVE MG/DL
KETONES UR QL: NEGATIVE
LEUKOCYTE EST, POC: NEGATIVE
NITRITE UR-MCNC: NEGATIVE MG/ML
PH UR: 5 [PH] (ref 5–8)
PROT UR STRIP-MCNC: NEGATIVE MG/DL
RBC # UR STRIP: ABNORMAL /UL
SP GR UR: 1.02 (ref 1–1.03)
UROBILINOGEN UR QL: NORMAL

## 2021-06-15 PROCEDURE — 99214 OFFICE O/P EST MOD 30 MIN: CPT | Performed by: NURSE PRACTITIONER

## 2021-06-15 PROCEDURE — 87086 URINE CULTURE/COLONY COUNT: CPT | Performed by: NURSE PRACTITIONER

## 2021-06-15 RX ORDER — NITROFURANTOIN 25; 75 MG/1; MG/1
100 CAPSULE ORAL DAILY
Qty: 56 CAPSULE | Refills: 3 | Status: SHIPPED | OUTPATIENT
Start: 2021-06-15 | End: 2021-10-11 | Stop reason: SDUPTHER

## 2021-06-15 RX ORDER — TAMSULOSIN HYDROCHLORIDE 0.4 MG/1
1 CAPSULE ORAL DAILY
COMMUNITY
Start: 2021-05-19 | End: 2021-06-15

## 2021-06-15 NOTE — PROGRESS NOTES
"Chief Complaint  RIGHT FLANK PAIN/DYSURIA (POST OPT RIGHT URESTEROSCOPY 05/26/21)    Pool Edouard presents to [facility]  History of Present Illness    Ms. Briseyda Edouard  is a pleasant 50-year-old very Pleasant patient, who presents for  follow-up today. She had Cystoscopy, Ureteroscopy Laser Lithotripsy for a very painful 5.5 mm Right Distal UVJ  Stone By Dr. Wayne on 05/26/2021.  She reports doing relatively well post procedure and has been in no apparent distress onto early this morning, upon exam today she reports flank pain with dysuria.     Patient however still reports Right groin discomfort with Nausea.  Besides dysuria, and her flank pain, she denies back pain, or any CVA tenderness or urinary symptoms of  frequency,urgency or gross hematuria. Her Urine dipstick today is completely negative for any infection. She has 3+ microscopic hematuria which is chronic for her(states she has always had microscopic hematuria).    Overall, she has not had any post opt complications associated with the procedure  such as  Fevers, N/V, severe  pain in the groin/flank area, hematoma, spontaneous renal hemorrhage, or the risk of distal fragments.      Objective   Vital Signs:   Ht 170.2 cm (67\")   Wt 117 kg (257 lb)   BMI 40.25 kg/m²     Physical Exam  Constitutional:       General: She is not in acute distress.     Appearance: She is well-developed. She is obese. She is ill-appearing.   HENT:      Head: Normocephalic and atraumatic.   Eyes:      Pupils: Pupils are equal, round, and reactive to light.   Neck:      Thyroid: No thyromegaly.      Trachea: No tracheal deviation.   Cardiovascular:      Rate and Rhythm: Normal rate and regular rhythm.      Heart sounds: No murmur heard.     Pulmonary:      Effort: Pulmonary effort is normal. No respiratory distress.      Breath sounds: Normal breath sounds. No stridor. No wheezing.   Abdominal:      General: " Bowel sounds are normal. There is distension.      Palpations: Abdomen is soft.      Tenderness: There is abdominal tenderness.   Genitourinary:     Labia:         Right: No tenderness.         Left: No tenderness.       Vagina: Normal. No vaginal discharge.   Musculoskeletal:         General: Tenderness present. No deformity. Normal range of motion.      Cervical back: Normal range of motion.   Skin:     General: Skin is warm and dry.      Capillary Refill: Capillary refill takes less than 2 seconds.      Coloration: Skin is not pale.      Findings: No erythema or rash.   Neurological:      Mental Status: She is alert and oriented to person, place, and time.      Cranial Nerves: No cranial nerve deficit.      Sensory: No sensory deficit.      Coordination: Coordination normal.   Psychiatric:         Behavior: Behavior normal.         Thought Content: Thought content normal.         Judgment: Judgment normal.        Result Review :     CMP    CMP 5/7/21   Glucose 87   BUN 12   Creatinine 1.14 (A)   eGFR Non African Am 51 (A)   Sodium 142   Potassium 4.2   Chloride 107   Calcium 9.7   Albumin 4.31   Total Bilirubin 0.3   Alkaline Phosphatase 102   AST (SGOT) 17   ALT (SGPT) 6   (A) Abnormal value            CBC w/diff    CBC w/Diff 5/7/21   WBC 12.67 (A)   RBC 5.14   Hemoglobin 14.6   Hematocrit 47.8 (A)   MCV 93.0   MCH 28.4   MCHC 30.5 (A)   RDW 12.6   Platelets 335   Neutrophil Rel % 57.3   Immature Granulocyte Rel % 0.7 (A)   Lymphocyte Rel % 29.1   Monocyte Rel % 7.0   Eosinophil Rel % 4.9   Basophil Rel % 1.0   (A) Abnormal value            UA    Urinalysis 5/7/21 5/7/21 5/20/21 6/15/21    2117 2117     Squamous Epithelial Cells, UA  0-2     Specific Gravity, UA 1.008      Ketones, UA Negative  Negative Negative   Blood, UA Moderate (2+) (A)      Leukocytes, UA Small (1+) (A)  Small (1+) (A) Negative   Nitrite, UA Negative      RBC, UA  21-30 (A)     WBC, UA  6-12 (A)     Bacteria, UA  1+ (A)     (A) Abnormal  value            Urine Culture    Urine Culture 5/7/21 5/20/21 6/15/21   Urine Culture >100,000 CFU/mL Mixed Belkis Isolated 50,000 CFU/mL Mixed Belkis Isolated No growth           Data reviewed: Radiologic studies CT abdomen and pelvis done 05/07/2021, x-ray lumbar spine done 05/18/2021.          Assessment and Plan    Diagnoses and all orders for this visit:    1. Right ureteral stone (Primary)  -     Cancel: XR abdomen kub; Future    2. Frequency of micturition  -     POC Urinalysis Dipstick, Automated  -     nitrofurantoin, macrocrystal-monohydrate, (Macrobid) 100 MG capsule; Take 1 capsule by mouth Daily.  Dispense: 56 capsule; Refill: 3    3. Dysuria  -     Urine Culture - Urine, Urine, Random Void  -     nitrofurantoin, macrocrystal-monohydrate, (Macrobid) 100 MG capsule; Take 1 capsule by mouth Daily.  Dispense: 56 capsule; Refill: 3    4. History of recurrent UTIs  -     nitrofurantoin, macrocrystal-monohydrate, (Macrobid) 100 MG capsule; Take 1 capsule by mouth Daily.  Dispense: 56 capsule; Refill: 3    5. Microhematuria  -     nitrofurantoin, macrocrystal-monohydrate, (Macrobid) 100 MG capsule; Take 1 capsule by mouth Daily.  Dispense: 56 capsule; Refill: 3    Interstitial Cystitis-we discussed the diagnosis and management of this condition.  I indicated that it was a multifactorial condition with multifactorial symptomatology, Including frequency, urgency, the sensation of urinary tract infection in the absence of a positive culture, sexual symptomatology including significant dyspareunia.      We discussed treatment options including the importance of making a clinical diagnosis and the cystoscopic findings including Hunner's ulcers etc.  We also discussed medical management including pharmacologic treatment such as amitriptyline, naturopathic treatments such as pumpkin oil and which more aggressive options of Botox injections as well as the relative risks and merits of this.  We also discussed the use of  dietary manipulation including the over-the-counter product relief which basically decreases the acid in the urine and avoidance of ascitic-containing foods such as citrus is etc.    Discussed a kidney stone prevention diet to include increasing p.o. fluid intake, to at least 1 to 2 L of water daily.  She is to avoid caffeine products such as cola, coffee, and to avoid soft or soda drinks.  She is to decrease her sodium consumption as in  Fast foods, reeves, salted nuts, canned foods, and smoked/cured foods. She is also to decrease her oxalate consumption, as in spinach, Catie greens, and Rhubarb.  Also important is to decrease protein intake, as in red meats, peanut butter, and also avoid nuts.      Follow Up   No follow-ups on file.  Patient was given instructions and counseling regarding her condition or for health maintenance advice. Please see specific information pulled into the AVS if appropriate.

## 2021-06-16 ENCOUNTER — TELEPHONE (OUTPATIENT)
Dept: UROLOGY | Facility: CLINIC | Age: 51
End: 2021-06-16

## 2021-06-16 LAB — BACTERIA SPEC AEROBE CULT: NO GROWTH

## 2021-06-16 NOTE — TELEPHONE ENCOUNTER
Called patient and let her know her urine culture came back with no infection. Patient verbalized understanding.

## 2021-06-17 NOTE — PATIENT INSTRUCTIONS
"Textbook of Natural Medicine (5th ed., pp. 9808-8741.e3). [place], [place]: [employer].\">   Dietary Guidelines to Help Prevent Kidney Stones  Kidney stones are deposits of minerals and salts that form inside your kidneys. Your risk of developing kidney stones may be greater depending on your diet, your lifestyle, the medicines you take, and whether you have certain medical conditions. Most people can lower their chances of developing kidney stones by following the instructions below. Your dietitian may give you more specific instructions depending on your overall health and the type of kidney stones you tend to develop.  What are tips for following this plan?  Reading food labels    · Choose foods with \"no salt added\" or \"low-salt\" labels. Limit your salt (sodium) intake to less than 1,500 mg a day.  · Choose foods with calcium for each meal and snack. Try to eat about 300 mg of calcium at each meal. Foods that contain 200-500 mg of calcium a serving include:  ? 8 oz (237 mL) of milk, calcium-fortifiednon-dairy milk, and calcium-fortifiedfruit juice. Calcium-fortified means that calcium has been added to these drinks.  ? 8 oz (237 mL) of kefir, yogurt, and soy yogurt.  ? 4 oz (114 g) of tofu.  ? 1 oz (28 g) of cheese.  ? 1 cup (150 g) of dried figs.  ? 1 cup (91 g) of cooked broccoli.  ? One 3 oz (85 g) can of sardines or mackerel.  Most people need 1,000-1,500 mg of calcium a day. Talk to your dietitian about how much calcium is recommended for you.  Shopping  · Buy plenty of fresh fruits and vegetables. Most people do not need to avoid fruits and vegetables, even if these foods contain nutrients that may contribute to kidney stones.  · When shopping for convenience foods, choose:  ? Whole pieces of fruit.  ? Pre-made salads with dressing on the side.  ? Low-fat fruit and yogurt smoothies.  · Avoid buying frozen meals or prepared deli foods. These can be high in sodium.  · Look for foods with live cultures, such as " yogurt and kefir.  · Choose high-fiber grains, such as whole-wheat breads, oat bran, and wheat cereals.  Cooking  · Do not add salt to food when cooking. Place a salt shaker on the table and allow each person to add his or her own salt to taste.  · Use vegetable protein, such as beans, textured vegetable protein (TVP), or tofu, instead of meat in pasta, casseroles, and soups.  Meal planning  · Eat less salt, if told by your dietitian. To do this:  ? Avoid eating processed or pre-made food.  ? Avoid eating fast food.  · Eat less animal protein, including cheese, meat, poultry, or fish, if told by your dietitian. To do this:  ? Limit the number of times you have meat, poultry, fish, or cheese each week. Eat a diet free of meat at least 2 days a week.  ? Eat only one serving each day of meat, poultry, fish, or seafood.  ? When you prepare animal protein, cut pieces into small portion sizes. For most meat and fish, one serving is about the size of the palm of your hand.  · Eat at least five servings of fresh fruits and vegetables each day. To do this:  ? Keep fruits and vegetables on hand for snacks.  ? Eat one piece of fruit or a handful of berries with breakfast.  ? Have a salad and fruit at lunch.  ? Have two kinds of vegetables at dinner.  · Limit foods that are high in a substance called oxalate. These include:  ? Spinach (cooked), rhubarb, beets, sweet potatoes, and Swiss chard.  ? Peanuts.  ? Potato chips, french fries, and baked potatoes with skin on.  ? Nuts and nut products.  ? Chocolate.  · If you regularly take a diuretic medicine, make sure to eat at least 1 or 2 servings of fruits or vegetables that are high in potassium each day. These include:  ? Avocado.  ? Banana.  ? Orange, prune, carrot, or tomato juice.  ? Baked potato.  ? Cabbage.  ? Beans and split peas.  Lifestyle    · Drink enough fluid to keep your urine pale yellow. This is the most important thing you can do. Spread your fluid intake  throughout the day.  · If you drink alcohol:  ? Limit how much you use to:  § 0-1 drink a day for women who are not pregnant.  § 0-2 drinks a day for men.  ? Be aware of how much alcohol is in your drink. In the [place] one drink equals one 12 oz bottle of beer (355 mL), one 5 oz glass of wine (148 mL), or one 1½ oz glass of hard liquor (44 mL).  · Lose weight if told by your health care provider. Work with your dietitian to find an eating plan and weight loss strategies that work best for you.  General information  · Talk to your health care provider and dietitian about taking daily supplements. You may be told the following depending on your health and the cause of your kidney stones:  ? Not to take supplements with vitamin C.  ? To take a calcium supplement.  ? To take a daily probiotic supplement.  ? To take other supplements such as magnesium, fish oil, or vitamin B6.  · Take over-the-counter and prescription medicines only as told by your health care provider. These include supplements.  What foods should I limit?  Limit your intake of the following foods, or eat them as told by your dietitian.  Vegetables  Spinach. Rhubarb. Beets. Canned vegetables. Pickles. Olives. Baked potatoes with skin.  Grains  Wheat bran. Baked goods. Salted crackers. Cereals high in sugar.  Meats and other proteins  Nuts. Nut butters. Large portions of meat, poultry, or fish. Salted, precooked, or cured meats, such as sausages, meat loaves, and hot dogs.  Dairy  Cheese.  Beverages  Regular soft drinks. Regular vegetable juice.  Seasonings and condiments  Seasoning blends with salt. Salad dressings. Soy sauce. Ketchup. Barbecue sauce.  Other foods  Canned soups. Canned pasta sauce. Casseroles. Pizza. Lasagna. Frozen meals. Potato chips. French fries.  The items listed above may not be a complete list of foods and beverages you should limit. Contact a dietitian for more information.  What foods should I avoid?  Talk to your dietitian  about specific foods you should avoid based on the type of kidney stones you have and your overall health.  Fruits  Grapefruit.  The item listed above may not be a complete list of foods and beverages you should avoid. Contact a dietitian for more information.  Summary  · Kidney stones are deposits of minerals and salts that form inside your kidneys.  · You can lower your risk of kidney stones by making changes to your diet.  · The most important thing you can do is drink enough fluid. Drink enough fluid to keep your urine pale yellow.  · Talk to your dietitian about how much calcium you should have each day, and eat less salt and animal protein as told by your dietitian.  This information is not intended to replace advice given to you by your health care provider. Make sure you discuss any questions you have with your health care provider.  Document Revised: 12/10/2020 Document Reviewed: 12/10/2020  [employer] Patient Education © 2021 [employer]      Low-Purine Eating Plan  A low-purine eating plan involves making food choices to limit your intake of purine. Purine is a kind of uric acid. Too much uric acid in your blood can cause certain conditions, such as gout and kidney stones. Eating a low-purine diet can help control these conditions.  What are tips for following this plan?  Reading food labels    · Avoid foods with saturated or Trans fat.  · Check the ingredient list of grains-based foods, such as bread and cereal, to make sure that they contain whole grains.  · Check the ingredient list of sauces or soups to make sure they do not contain meat or fish.  · When choosing soft drinks, check the ingredient list to make sure they do not contain high-fructose corn syrup.  Shopping  · Buy plenty of fresh fruits and vegetables.  · Avoid buying canned or fresh fish.  · Buy dairy products labeled as low-fat or nonfat.  · Avoid buying premade or processed foods. These foods are often high in fat, salt (sodium), and  added sugar.  Cooking  · Use olive oil instead of butter when cooking. Oils like olive oil, canola oil, and sunflower oil contain healthy fats.  Meal planning  · Learn which foods do or do not affect you. If you find out that a food tends to cause your gout symptoms to flare up, avoid eating that food. You can enjoy foods that do not cause problems. If you have any questions about a food item, talk with your dietitian or health care provider.  · Limit foods high in fat, especially saturated fat. Fat makes it harder for your body to get rid of uric acid.  · Choose foods that are lower in fat and are lean sources of protein.  General guidelines  · Limit alcohol intake to no more than 1 drink a day for nonpregnant women and 2 drinks a day for men. One drink equals 12 oz of beer, 5 oz of wine, or 1½ oz of hard liquor. Alcohol can affect the way your body gets rid of uric acid.  · Drink plenty of water to keep your urine clear or pale yellow. Fluids can help remove uric acid from your body.  · If directed by your health care provider, take a vitamin C supplement.  · Work with your health care provider and dietitian to develop a plan to achieve or maintain a healthy weight. Losing weight can help reduce uric acid in your blood.  What foods are recommended?  The items listed may not be a complete list. Talk with your dietitian about what dietary choices are best for you.  Foods low in purines  Foods low in purines do not need to be limited. These include:  · All fruits.  · All low-purine vegetables, pickles, and olives.  · Breads, pasta, rice, cornbread, and popcorn. Cake and other baked goods.  · All dairy foods.  · Eggs, nuts, and nut butters.  · Spices and condiments, such as salt, herbs, and vinegar.  · Plant oils, butter, and margarine.  · Water, sugar-free soft drinks, tea, coffee, and cocoa.  · Vegetable-based soups, broths, sauces, and gravies.  Foods moderate in purines  Foods moderate in purines should be limited  to the amounts listed.  · ½ cup of asparagus, cauliflower, spinach, mushrooms, or green peas, each day.  · 2/3 cup uncooked oatmeal, each day.  · ¼ cup dry wheat bran or wheat germ, each day.  · 2-3 ounces of meat or poultry, each day.  · 4-6 ounces of shellfish, such as crab, lobster, oysters, or shrimp, each day.  · 1 cup cooked beans, peas, or lentils, each day.  · Soup, broths, or bouillon made from meat or fish. Limit these foods as much as possible.  What foods are not recommended?  The items listed may not be a complete list. Talk with your dietitian about what dietary choices are best for you.  Limit your intake of foods high in purines, including:  · Beer and other alcohol.  · Meat-based gravy or sauce.  · Canned or fresh fish, such as:  ? Anchovies, sardines, herring, and tuna.  ? Mussels and scallops.  ? Codfish, trout, and emily.  · [place].  · Organ meats, such as:  ? Liver or kidney.  ? Tripe.  ? Sweetbreads (thymus gland or pancreas).  · Wild game or goose.  · Yeast or yeast extract supplements.  · Drinks sweetened with high-fructose corn syrup.  Summary  · Eating a low-purine diet can help control conditions caused by too much uric acid in the body, such as gout or kidney stones.  · Choose low-purine foods, limit alcohol, and limit foods high in fat.  · You will learn over time which foods do or do not affect you. If you find out that a food tends to cause your gout symptoms to flare up, avoid eating that food.  This information is not intended to replace advice given to you by your health care provider. Make sure you discuss any questions you have with your health care provider.  Document Revised: 11/30/2018 Document Reviewed: 01/31/2018  [employer] Patient Education © 2021 [employer]      Interstitial Cystitis    Interstitial cystitis is inflammation of the bladder. This may cause pain in the bladder area as well as a frequent and urgent need to urinate. The bladder is a hollow organ in the lower  part of the abdomen. It stores urine after the urine is made in the kidneys.  The severity of interstitial cystitis can vary from person to person. You may have flare-ups, and then your symptoms may go away for a while. For many people, it becomes a long-term (chronic) problem.  What are the causes?  The cause of this condition is not known.  What increases the risk?  The following factors may make you more likely to develop this condition:  You are female.  You have fibromyalgia.  You have irritable bowel syndrome (IBS).  You have endometriosis.  This condition may be aggravated by:  Stress.  Smoking.  Spicy foods.  What are the signs or symptoms?  Symptoms of interstitial cystitis vary, and they can change over time. Symptoms may include:  Discomfort or pain in the bladder area, which is in the lower abdomen. Pain can range from mild to severe. The pain may change in intensity as the bladder fills with urine or as it empties.  Pain in the pelvic area, between the hip bones.  An urgent need to urinate.  Frequent urination.  Pain during urination.  Pain during sex.  Blood in the urine.  For women, symptoms often get worse during menstruation.  How is this diagnosed?  This condition is diagnosed based on your symptoms, your medical history, and a physical exam. You may have tests to rule out other conditions, such as:  Urine tests.  Cystoscopy. For this test, a tool similar to a very thin telescope is used to look into your bladder.  Biopsy. This involves taking a sample of tissue from the bladder to be examined under a microscope.  How is this treated?  There is no cure for this condition, but treatment can help you control your symptoms. Work closely with your health care provider to find the most effective treatments for you. Treatment options may include:  Medicines to relieve pain and reduce how often you feel the need to urinate.  Learning ways to control when you urinate (bladder training).  Lifestyle changes,  such as changing your diet or taking steps to control stress.  Using a device that provides electrical stimulation to your nerves, which can relieve pain (neuromodulation therapy). The device is placed on your back, where it blocks the nerves that cause you to feel pain in your bladder area.  A procedure that stretches your bladder by filling it with air or fluid.  Surgery. This is rare. It is only done for extreme cases, if other treatments do not help.  Follow these instructions at home:  Bladder training    Use bladder training techniques as directed. Techniques may include:  Urinating at scheduled times.  Training yourself to delay urination.  Doing exercises (Kegel exercises) to strengthen the muscles that control urine flow.  Keep a bladder diary.  Write down the times that you urinate and any symptoms that you have. This can help you find out which foods, liquids, or activities make your symptoms worse.  Use your bladder diary to schedule bathroom trips. If you are away from home, plan to be near a bathroom at each of your scheduled times.  Make sure that you urinate just before you leave the house and just before you go to bed.  Eating and drinking  Make dietary changes as recommended by your health care provider. You may need to avoid:  Spicy foods.  Foods that contain a lot of potassium.  Limit your intake of beverages that make you need to urinate. These include:  Caffeinated beverages like soda, coffee, and tea.  Alcohol.  General instructions  Take over-the-counter and prescription medicines only as told by your health care provider.  Do not drink alcohol.  You can try a warm or cool compress over your bladder for comfort.  Avoid wearing tight clothing.  Do not use any products that contain nicotine or tobacco, such as cigarettes and e-cigarettes. If you need help quitting, ask your health care provider.  Keep all follow-up visits as told by your health care provider. This is important.  Contact a  health care provider if you have:  Symptoms that do not get better with treatment.  Pain or discomfort that gets worse.  More frequent urges to urinate.  A fever.  Get help right away if:  You have no control over when you urinate.  Summary  Interstitial cystitis is inflammation of the bladder.  This condition may cause pain in the bladder area as well as a frequent and urgent need to urinate.  You may have flare-ups of the condition, and then it may go away for a while. For many people, it becomes a long-term (chronic) problem.  There is no cure for interstitial cystitis, but treatment methods are available to control your symptoms.  This information is not intended to replace advice given to you by your health care provider. Make sure you discuss any questions you have with your health care provider.  Document Revised: 11/30/2018 Document Reviewed: 11/12/2018  [employer] Patient Education © 2021 [employer]    Dysuria  Dysuria is pain or discomfort while urinating. The pain or discomfort may be felt in the part of your body that drains urine from the bladder (urethra) or in the surrounding tissue of the genitals. The pain may also be felt in the groin area, lower abdomen, or lower back. You may have to urinate frequently or have the sudden feeling that you have to urinate (urgency). Dysuria can affect both men and women, but it is more common in women.  Dysuria can be caused by many different things, including:  · Urinary tract infection.  · Kidney stones or bladder stones.  · Certain sexually transmitted infections (STIs), such as chlamydia.  · Dehydration.  · Inflammation of the tissues of the vagina.  · Use of certain medicines.  · Use of certain soaps or scented products that cause irritation.  Follow these instructions at home:  General instructions  · Watch your condition for any changes.  · Urinate often. Avoid holding urine for long periods of time.  · After a bowel movement or urination, women should  cleanse from front to back, using each tissue only once.  · Urinate after sexual intercourse.  · Keep all follow-up visits as told by your health care provider. This is important.  · If you had any tests done to find the cause of dysuria, it is up to you to get your test results. Ask your health care provider, or the department that is doing the test, when your results will be ready.  Eating and drinking    · Drink enough fluid to keep your urine pale yellow.  · Avoid caffeine, tea, and alcohol. They can irritate the bladder and make dysuria worse. In men, alcohol may irritate the prostate.  Medicines  · Take over-the-counter and prescription medicines only as told by your health care provider.  · If you were prescribed an antibiotic medicine, take it as told by your health care provider. Do not stop taking the antibiotic even if you start to feel better.  Contact a health care provider if:  · You have a fever.  · You develop pain in your back or sides.  · You have nausea or vomiting.  · You have blood in your urine.  · You are not urinating as often as you usually do.  Get help right away if:  · Your pain is severe and not relieved with medicines.  · You cannot eat or drink without vomiting.  · You are confused.  · You have a rapid heartbeat while at rest.  · You have shaking or chills.  · You feel extremely weak.  Summary  · Dysuria is pain or discomfort while urinating. Many different conditions can lead to dysuria.  · If you have dysuria, you may have to urinate frequently or have the sudden feeling that you have to urinate (urgency).  · Watch your condition for any changes. Keep all follow-up visits as told by your health care provider.  · Make sure that you urinate often and drink enough fluid to keep your urine pale yellow.  This information is not intended to replace advice given to you by your health care provider. Make sure you discuss any questions you have with your health care provider.  Document  Revised: 11/30/2018 Document Reviewed: 10/04/2018  [employer] Patient Education © 2021 [employer]

## 2021-10-11 ENCOUNTER — OFFICE VISIT (OUTPATIENT)
Dept: UROLOGY | Facility: CLINIC | Age: 51
End: 2021-10-11

## 2021-10-11 VITALS — HEIGHT: 67 IN | WEIGHT: 257 LBS | BODY MASS INDEX: 40.34 KG/M2

## 2021-10-11 DIAGNOSIS — R35.0 FREQUENCY OF MICTURITION: ICD-10-CM

## 2021-10-11 DIAGNOSIS — R31.29 MICROHEMATURIA: ICD-10-CM

## 2021-10-11 DIAGNOSIS — R10.9 RIGHT FLANK PAIN, CHRONIC: ICD-10-CM

## 2021-10-11 DIAGNOSIS — N32.81 DETRUSOR HYPERREFLEXIA: ICD-10-CM

## 2021-10-11 DIAGNOSIS — N20.1 RIGHT URETERAL STONE: ICD-10-CM

## 2021-10-11 DIAGNOSIS — N39.41 URGE INCONTINENCE OF URINE: ICD-10-CM

## 2021-10-11 DIAGNOSIS — G89.29 RIGHT FLANK PAIN, CHRONIC: ICD-10-CM

## 2021-10-11 DIAGNOSIS — R35.0 URINE FREQUENCY: ICD-10-CM

## 2021-10-11 DIAGNOSIS — Z87.440 HISTORY OF RECURRENT UTIS: ICD-10-CM

## 2021-10-11 DIAGNOSIS — R30.0 DYSURIA: ICD-10-CM

## 2021-10-11 DIAGNOSIS — N30.00 ACUTE CYSTITIS WITHOUT HEMATURIA: Primary | ICD-10-CM

## 2021-10-11 DIAGNOSIS — N32.81 DETRUSOR INSTABILITY: ICD-10-CM

## 2021-10-11 LAB
BILIRUB BLD-MCNC: NEGATIVE MG/DL
CLARITY, POC: CLEAR
COLOR UR: YELLOW
EXPIRATION DATE: ABNORMAL
GLUCOSE UR STRIP-MCNC: NEGATIVE MG/DL
KETONES UR QL: NEGATIVE
LEUKOCYTE EST, POC: NEGATIVE
Lab: ABNORMAL
NITRITE UR-MCNC: NEGATIVE MG/ML
PH UR: 6 [PH] (ref 5–8)
PROT UR STRIP-MCNC: NEGATIVE MG/DL
RBC # UR STRIP: ABNORMAL /UL
SP GR UR: 1.01 (ref 1–1.03)
UROBILINOGEN UR QL: NORMAL

## 2021-10-11 PROCEDURE — 99214 OFFICE O/P EST MOD 30 MIN: CPT | Performed by: NURSE PRACTITIONER

## 2021-10-11 RX ORDER — NITROFURANTOIN 25; 75 MG/1; MG/1
100 CAPSULE ORAL DAILY
Qty: 56 CAPSULE | Refills: 3 | Status: SHIPPED | OUTPATIENT
Start: 2021-10-11 | End: 2022-01-11

## 2021-10-11 RX ORDER — PROMETHAZINE HYDROCHLORIDE 25 MG/1
25 TABLET ORAL EVERY 8 HOURS PRN
Qty: 20 TABLET | Refills: 0 | Status: SHIPPED | OUTPATIENT
Start: 2021-10-11 | End: 2022-01-11 | Stop reason: SDUPTHER

## 2021-10-11 NOTE — PROGRESS NOTES
"Chief Complaint  Nephrolithiasis    Subjective          Briseyda Edouard presents to [facility]  History of Present Illness    Ms. Briseyda Edouard  is a pleasant 50-year-old patient, who presents for  follow-up today.  This is her 3-4 month follow up. She is in no apparent distress and reports doing relatively well.  She denies any urinary/kidney stone symptoms today.    She had Cystoscopy, Ureteroscopy Laser Lithotripsy for a very painful 5.5 mm Right Distal UVJ  Stone By Dr. Wayne on 05/26/2021.  She reports doing relatively well post procedure and has been in no apparent distress since last visit. T    breanna, the  Patient denies having any Right groin discomfort or Nausea. she denies dysuria, flank pain, she denies back pain, or any CVA tenderness or urinary symptoms of  frequency,urgency or gross hematuria. Her Urine dipstick today is completely negative for any infection.  It is negative for gross hematuria.  She has 3+ microscopic hematuria which is chronic for her(states she has always had microscopic hematuria for over 20+ years).     Overall, she reports doing relatively better. She is taking her ABX and Probiotics diligently. She reports increasing her PO fluid intake to at least 2-3 liters daily, and is avoiding any bladder irritants.    Objective   Vital Signs:   Ht 170.2 cm (67.01\")   Wt 117 kg (257 lb)   BMI 40.24 kg/m²     Physical Exam  Constitutional:       General: She is not in acute distress.     Appearance: She is well-developed. She is obese.   HENT:      Head: Normocephalic and atraumatic.   Eyes:      Pupils: Pupils are equal, round, and reactive to light.   Neck:      Thyroid: No thyromegaly.      Trachea: No tracheal deviation.   Cardiovascular:      Rate and Rhythm: Normal rate and regular rhythm.      Heart sounds: No murmur heard.      Pulmonary:      Effort: Pulmonary effort is normal. No respiratory distress.      Breath sounds: Normal breath " sounds. No stridor. No wheezing.   Abdominal:      General: Bowel sounds are normal.      Palpations: Abdomen is soft.      Tenderness: There is no abdominal tenderness.   Genitourinary:     Labia:         Right: No tenderness.         Left: No tenderness.       Vagina: Normal. No vaginal discharge.   Musculoskeletal:         General: Tenderness present. No deformity. Normal range of motion.      Cervical back: Normal range of motion.   Skin:     General: Skin is warm and dry.      Capillary Refill: Capillary refill takes less than 2 seconds.      Coloration: Skin is not pale.      Findings: No erythema or rash.   Neurological:      Mental Status: She is alert and oriented to person, place, and time.      Cranial Nerves: No cranial nerve deficit.      Sensory: No sensory deficit.      Coordination: Coordination normal.   Psychiatric:         Behavior: Behavior normal.         Thought Content: Thought content normal.         Judgment: Judgment normal.        Result Review :     UA    Urinalysis 5/20/21 6/15/21 10/11/21   Ketones, UA Negative Negative Negative   Leukocytes, UA Small (1+) (A) Negative Negative   (A) Abnormal value            Urine Culture    Urine Culture 5/7/21 5/20/21 6/15/21   Urine Culture >100,000 CFU/mL Mixed Belkis Isolated 50,000 CFU/mL Mixed Belkis Isolated No growth                     Assessment and Plan    Diagnoses and all orders for this visit:    1. Acute cystitis without hematuria (Primary)  -     nitrofurantoin, macrocrystal-monohydrate, (Macrobid) 100 MG capsule; Take 1 capsule by mouth Daily.  Dispense: 56 capsule; Refill: 3  -     promethazine (PHENERGAN) 25 MG tablet; Take 1 tablet by mouth Every 8 (Eight) Hours As Needed for Nausea or Vomiting.  Dispense: 20 tablet; Refill: 0    2. Microhematuria  -     nitrofurantoin, macrocrystal-monohydrate, (Macrobid) 100 MG capsule; Take 1 capsule by mouth Daily.  Dispense: 56 capsule; Refill: 3    3. Urge incontinence of urine  -      nitrofurantoin, macrocrystal-monohydrate, (Macrobid) 100 MG capsule; Take 1 capsule by mouth Daily.  Dispense: 56 capsule; Refill: 3  -     promethazine (PHENERGAN) 25 MG tablet; Take 1 tablet by mouth Every 8 (Eight) Hours As Needed for Nausea or Vomiting.  Dispense: 20 tablet; Refill: 0    4. Detrusor instability    5. Detrusor hyperreflexia  -     nitrofurantoin, macrocrystal-monohydrate, (Macrobid) 100 MG capsule; Take 1 capsule by mouth Daily.  Dispense: 56 capsule; Refill: 3  -     promethazine (PHENERGAN) 25 MG tablet; Take 1 tablet by mouth Every 8 (Eight) Hours As Needed for Nausea or Vomiting.  Dispense: 20 tablet; Refill: 0    6. Urine frequency  -     POC Urinalysis Dipstick, Automated    7. Frequency of micturition  -     nitrofurantoin, macrocrystal-monohydrate, (Macrobid) 100 MG capsule; Take 1 capsule by mouth Daily.  Dispense: 56 capsule; Refill: 3  -     promethazine (PHENERGAN) 25 MG tablet; Take 1 tablet by mouth Every 8 (Eight) Hours As Needed for Nausea or Vomiting.  Dispense: 20 tablet; Refill: 0    8. Dysuria  -     nitrofurantoin, macrocrystal-monohydrate, (Macrobid) 100 MG capsule; Take 1 capsule by mouth Daily.  Dispense: 56 capsule; Refill: 3  -     promethazine (PHENERGAN) 25 MG tablet; Take 1 tablet by mouth Every 8 (Eight) Hours As Needed for Nausea or Vomiting.  Dispense: 20 tablet; Refill: 0    9. History of recurrent UTIs  -     nitrofurantoin, macrocrystal-monohydrate, (Macrobid) 100 MG capsule; Take 1 capsule by mouth Daily.  Dispense: 56 capsule; Refill: 3    10. Right ureteral stone  -     promethazine (PHENERGAN) 25 MG tablet; Take 1 tablet by mouth Every 8 (Eight) Hours As Needed for Nausea or Vomiting.  Dispense: 20 tablet; Refill: 0    11. Right flank pain, chronic  -     promethazine (PHENERGAN) 25 MG tablet; Take 1 tablet by mouth Every 8 (Eight) Hours As Needed for Nausea or Vomiting.  Dispense: 20 tablet; Refill: 0        ASSESSMENT/PLAN  Interstitial Cystitis-AGAIN, We  discussed the diagnosis and management of this condition.  I indicated that it was a multifactorial condition with multifactorial symptomatology, Including frequency, urgency, the sensation of urinary tract infection in the absence of a positive culture, sexual symptomatology including significant dyspareunia.     We discussed treatment options including the importance of making a clinical diagnosis and the cystoscopic findings including Hunner's ulcers etc.  We also discussed medical management including pharmacologic treatment such as amitriptyline, naturopathic treatments such as pumpkin oil and which more aggressive options of Botox injections as well as the relative risks and merits of this.  We also discussed the use of dietary manipulation including the over-the-counter product relief which basically decreases the acid in the urine and avoidance of ascitic-containing foods such as citrus is etc.     Discussed a kidney stone prevention diet to include increasing p.o. fluid intake, to at least 1 to 2 L of water daily.  She is to avoid caffeine products such as cola, coffee, and to avoid soft or soda drinks.  She is to decrease her sodium consumption as in  Fast foods, reeves, salted nuts, canned foods, and smoked/cured foods. She is also to decrease her oxalate consumption, as in spinach, Catie greens, and Rhubarb.  Also important is to decrease protein intake, as in red meats, peanut butter, and also avoid nuts.    Recurrent urinary tract infections/OverActiveBladder/Atrophic/yeast vaginitis: She is in no apparent distress and reports a remarkable improvement in her overall symptoms. She is happy to be feeling better she states. She reports doing relatively better on her antibiotic prophylaxis, and would like to continue.     Again,  We discussed the types of organisms that are found in the urinary tract indicating that the vast majority are results of the patient's own gastrointestinal jose angel.  We discussed  how many of the antibiotics that are utilized can actually exacerbate these infections by creating resistant organisms and there is only a very few antibiotics that are concentrated in the urine and do not affect the rectal reservoir nor cause recurrent yeast vaginitis. We discussed the risk factors for recurrent infections being intercourse in younger patients and atrophic changes in older patients.  We discussed the symptoms that are found including pain, pressure, burning, frequency, urgency suprapubic pain and painful intercourse.  I discussed upper tract symptoms including fevers, chills, and indicated the workup would be much more aggressive if the patient were to present with recurrent infections in the face of upper tract symptomatology such as fever. I discussed the history of vesicoureteral reflux in young patients and finally chronic renal scarring as a result of such.         PLAN  We resent her urine for culture. I will call her with results if any bacteria growth not sensitive to her current therapy of Macrobid..    Will Continue Macrobid 100 mg PO Daily -Suppressive Therapy. She should increase her p.o. fluid intake to at least 1 to 2 L daily and avoid bladder irritants such as caffeine products, spicy foods, and citrusy foods.     I recommend concomitant probiotics with treatment with antibiotics to protect the rectal reservoir including over-the-counter yogurt preparations to shannan oral pills containing the appropriate probiotics. Patient reports the diligent use of Probiotics.    She is to also continue other medications for yeast vaginitis, atrophic vaginitis as prescribed above.    Will see her back in 3 months for Follow up in clinic and recheck her urinalysis at that time.    WILL DISCUSS REPEATING BLADDER HYDRODISTENTION PROCEDURE AT THAT TIME/BOTOX.    Patient is agreeable to plan of care.      Follow Up   Return in about 3 months (around 1/11/2022) for Next scheduled follow up/ recheck  urine/discuss scheduling her Botox.     Patient was given instructions and counseling regarding her condition or for health maintenance advice. Please see specific information pulled into the AVS if appropriate.

## 2021-10-11 NOTE — PATIENT INSTRUCTIONS
Urinary Incontinence    Urinary incontinence refers to a condition in which a person is unable to control where and when to pass urine. A person with this condition will urinate when he or she does not mean to (involuntarily).  What are the causes?  This condition may be caused by:  · Medicines.  · Infections.  · Constipation.  · Overactive bladder muscles.  · Weak bladder muscles.  · Weak pelvic floor muscles. These muscles provide support for the bladder, intestine, and, in women, the uterus.  · Enlarged prostate in men. The prostate is a gland near the bladder. When it gets too big, it can pinch the urethra. With the urethra blocked, the bladder can weaken and lose the ability to empty properly.  · Surgery.  · Emotional factors, such as anxiety, stress, or post-traumatic stress disorder (PTSD).  · Pelvic organ prolapse. This happens in women when organs shift out of place and into the vagina. This shift can prevent the bladder and urethra from working properly.  What increases the risk?  The following factors may make you more likely to develop this condition:  · Older age.  · Obesity and physical inactivity.  · Pregnancy and childbirth.  · Menopause.  · Diseases that affect the nerves or spinal cord (neurological diseases).  · Long-term (chronic) coughing. This can increase pressure on the bladder and pelvic floor muscles.  What are the signs or symptoms?  Symptoms may vary depending on the type of urinary incontinence you have. They include:  · A sudden urge to urinate, but passing urine involuntarily before you can get to a bathroom (urge incontinence).  · Suddenly passing urine with any activity that forces urine to pass, such as coughing, laughing, exercise, or sneezing (stress incontinence).  · Needing to urinate often, but urinating only a small amount, or constantly dribbling urine (overflow incontinence).  · Urinating because you cannot get to the bathroom in time due to a physical disability, such as  arthritis or injury, or communication and thinking problems, such as Alzheimer disease (functional incontinence).  How is this diagnosed?  This condition may be diagnosed based on:  · Your medical history.  · A physical exam.  · Tests, such as:  ? Urine tests.  ? X-rays of your kidney and bladder.  ? Ultrasound.  ? CT scan.  ? Cystoscopy. In this procedure, a health care provider inserts a tube with a light and camera (cystoscope) through the urethra and into the bladder in order to check for problems.  ? Urodynamic testing. These tests assess how well the bladder, urethra, and sphincter can store and release urine. There are different types of urodynamic tests, and they vary depending on what the test is measuring.  To help diagnose your condition, your health care provider may recommend that you keep a log of when you urinate and how much you urinate.  How is this treated?  Treatment for this condition depends on the type of incontinence that you have and its cause. Treatment may include:  · Lifestyle changes, such as:  ? Quitting smoking.  ? Maintaining a healthy weight.  ? Staying active. Try to get 150 minutes of moderate-intensity exercise every week. Ask your health care provider which activities are safe for you.  ? Eating a healthy diet.  § Avoid high-fat foods, like fried foods.  § Avoid refined carbohydrates like white bread and white rice.  § Limit how much alcohol and caffeine you drink.  § Increase your fiber intake. Foods such as fresh fruits, vegetables, beans, and whole grains are healthy sources of fiber.  · Pelvic floor muscle exercises.  · Bladder training, such as lengthening the amount of time between bathroom breaks, or using the bathroom at regular intervals.  · Using techniques to suppress bladder urges. This can include distraction techniques or controlled breathing exercises.  · Medicines to relax the bladder muscles and prevent bladder spasms.  · Medicines to help slow or prevent the  growth of a man's prostate.  · Botox injections. These can help relax the bladder muscles.  · Using pulses of electricity to help change bladder reflexes (electrical nerve stimulation).  · For women, using a medical device to prevent urine leaks. This is a small, tampon-like, disposable device that is inserted into the urethra.  · Injecting collagen or carbon beads (bulking agents) into the urinary sphincter. These can help thicken tissue and close the bladder opening.  · Surgery.  Follow these instructions at home:  Lifestyle  · Limit alcohol and caffeine. These can fill your bladder quickly and irritate it.  · Keep yourself clean to help prevent odors and skin damage. Ask your doctor about special skin creams and cleansers that can protect the skin from urine.  · Consider wearing pads or adult diapers. Make sure to change them regularly, and always change them right after experiencing incontinence.  General instructions  · Take over-the-counter and prescription medicines only as told by your health care provider.  · Use the bathroom about every 3-4 hours, even if you do not feel the need to urinate. Try to empty your bladder completely every time. After urinating, wait a minute. Then try to urinate again.  · Make sure you are in a relaxed position while urinating.  · If your incontinence is caused by nerve problems, keep a log of the medicines you take and the times you go to the bathroom.  · Keep all follow-up visits as told by your health care provider. This is important.  Contact a health care provider if:  · You have pain that gets worse.  · Your incontinence gets worse.  Get help right away if:  · You have a fever or chills.  · You are unable to urinate.  · You have redness in your groin area or down your legs.  Summary  · Urinary incontinence refers to a condition in which a person is unable to control where and when to pass urine.  · This condition may be caused by medicines, infection, weak bladder  muscles, weak pelvic floor muscles, enlargement of the prostate (in men), or surgery.  · The following factors increase your risk for developing this condition: older age, obesity, pregnancy and childbirth, menopause, neurological diseases, and chronic coughing.  · There are several types of urinary incontinence. They include urge incontinence, stress incontinence, overflow incontinence, and functional incontinence.  · This condition is usually treated first with lifestyle and behavioral changes, such as quitting smoking, eating a healthier diet, and doing regular pelvic floor exercises. Other treatment options include medicines, bulking agents, medical devices, electrical nerve stimulation, or surgery.  This information is not intended to replace advice given to you by your health care provider. Make sure you discuss any questions you have with your health care provider.  Document Revised: 12/28/2018 Document Reviewed: 03/29/2018  [employer] Patient Education © 2021 [employer]  Interstitial Cystitis    Interstitial cystitis is inflammation of the bladder. This condition is also known as painful bladder syndrome. This may cause pain in the bladder area as well as a frequent and urgent need to urinate. The bladder is an organ that stores urine after the urine is made in the kidneys.  The severity of interstitial cystitis can vary from person to person. You may have flare-ups, and then your symptoms may go away for a while. For many people, it becomes a long-term (chronic) problem.  What are the causes?  The cause of this condition is not known.  What increases the risk?  The following factors may make you more likely to develop this condition:  · You are female.  · You have fibromyalgia.  · You have irritable bowel syndrome (IBS).  · You have endometriosis.  This condition may be aggravated by:  · Stress.  · Smoking.  · Spicy foods.  What are the signs or symptoms?  Symptoms of interstitial cystitis vary, and they  can change over time. Symptoms may include:  · Discomfort or pain in the bladder area, which is in the lower abdomen. Pain can range from mild to severe. The pain may change in intensity as the bladder fills with urine or as it empties.  · Pain in the pelvic area, between the hip bones.  · An urgent need to urinate.  · Frequent urination.  · Pain during urination.  · Pain during sex.  · Blood in the urine.  · Fatigue.  For women, symptoms often get worse during menstruation.  How is this diagnosed?  This condition is diagnosed based on your symptoms, your medical history, and a physical exam. You may have tests to rule out other conditions, such as:  · Urine tests.  · Cystoscopy. For this test, a tool similar to a very thin telescope is used to look into your bladder.  · Biopsy. This involves taking a sample of tissue from the bladder to be examined under a microscope.  How is this treated?  There is no cure for this condition, but treatment can help you control your symptoms. Work closely with your health care provider to find the most effective treatments for you. Treatment options may include:  · Medicines to relieve pain and reduce how often you feel the need to urinate. This treatment may include:  ? A procedure where a small amount of medicine that eases irritation is put inside your bladder through a catheter (bladder instillation).  · Lifestyle changes, such as changing your diet or taking steps to control stress.  · Physical therapy. This may include:  ? Exercises to help relax the pelvic floor muscles.  ? Massage to relax tight muscles (myofascial release).  · Learning ways to control when you urinate (bladder training).  · Using a device that provides electrical stimulation to your nerves, which can relieve pain (neuromodulation therapy). The device is placed on your back, where it blocks the nerves that cause you to feel pain in your bladder area.  · A procedure that stretches your bladder by filling it  with air or fluid (hydrodistention).  · Surgery. This is rare. It is only done for extreme cases, if other treatments do not help.  Follow these instructions at home:  Lifestyle  · Learn and practice relaxation techniques, such as deep breathing and muscle relaxation.  · Get care for your body and mental well-being, such as:  ? Cognitive behavioral therapy (CBT). This therapy changes the way you think or act in response to the fatigue. This may help improve how you feel.  ? Seeing a mental health therapist to evaluate and treat depression, if necessary.  · Work with your health care provider on other ways to manage pain. Acupuncture may be helpful.  · Avoid drinking alcohol.  · Do not use any products that contain nicotine or tobacco, such as cigarettes, e-cigarettes, and chewing tobacco. If you need help quitting, ask your health care provider.  Eating and drinking  · Make dietary changes as recommended by your health care provider. You may need to avoid:  ? Spicy foods.  ? Foods that contain a lot of potassium.  · Limit your intake of drinks that make you need to urinate. These include:  ? Caffeinated drinks like soda, coffee, and tea.  ? Alcohol.  Bladder training    · Use bladder training techniques as directed. Techniques may include:  ? Urinating at scheduled times.  ? Training yourself to delay urination.  · Keep a bladder diary.  ? Write down the times that you urinate and any symptoms that you have. This can help you find out which foods, liquids, or activities make your symptoms worse.  ? Use your bladder diary to schedule bathroom trips. If you are away from home, plan to be near a bathroom at each of your scheduled times.  · Make sure that you urinate just before you leave the house and just before you go to bed.    General instructions  · Take over-the-counter and prescription medicines only as told by your health care provider.  · You can try a warm or cool compress over your bladder for  comfort.  · Avoid wearing tight clothing.  · Do exercises to relax your pelvic floor muscles as told by your physical therapist.  · Keep all follow-up visits as told by your health care provider. This is important.  Where to find more information  To find more information or a support group near you, visit:  · [employer]: urologyhealth.org  · Interstitial Cystitis Association: ichelp.org  Contact a health care provider if you have:  · Symptoms that do not get better with treatment.  · Pain or discomfort that gets worse.  · More frequent urges to urinate.  · A fever.  Get help right away if:  · You have no control over when you urinate.  Summary  · Interstitial cystitis is inflammation of the bladder.  · This condition may cause pain in the bladder area as well as a frequent and urgent need to urinate.  · You may have flare-ups of the condition, and then it may go away for a while. For many people, it becomes a long-term (chronic) problem.  · There is no cure for interstitial cystitis, but treatment methods are available to control your symptoms.  This information is not intended to replace advice given to you by your health care provider. Make sure you discuss any questions you have with your health care provider.  Document Revised: 02/03/2021 Document Reviewed: 11/12/2018  [employer] Patient Education © 2021 [employer]

## 2022-01-11 ENCOUNTER — OFFICE VISIT (OUTPATIENT)
Dept: UROLOGY | Facility: CLINIC | Age: 52
End: 2022-01-11

## 2022-01-11 VITALS — HEIGHT: 67 IN | WEIGHT: 257 LBS | BODY MASS INDEX: 40.34 KG/M2

## 2022-01-11 DIAGNOSIS — N32.81 DETRUSOR INSTABILITY: ICD-10-CM

## 2022-01-11 DIAGNOSIS — N20.1 RIGHT URETERAL STONE: ICD-10-CM

## 2022-01-11 DIAGNOSIS — G89.29 RIGHT FLANK PAIN, CHRONIC: ICD-10-CM

## 2022-01-11 DIAGNOSIS — N32.81 DETRUSOR HYPERREFLEXIA: ICD-10-CM

## 2022-01-11 DIAGNOSIS — R31.0 GROSS HEMATURIA: ICD-10-CM

## 2022-01-11 DIAGNOSIS — R35.0 FREQUENCY OF MICTURITION: ICD-10-CM

## 2022-01-11 DIAGNOSIS — N30.00 ACUTE CYSTITIS WITHOUT HEMATURIA: Primary | ICD-10-CM

## 2022-01-11 DIAGNOSIS — R30.0 DYSURIA: ICD-10-CM

## 2022-01-11 DIAGNOSIS — N39.41 URGE INCONTINENCE OF URINE: ICD-10-CM

## 2022-01-11 DIAGNOSIS — R10.9 RIGHT FLANK PAIN, CHRONIC: ICD-10-CM

## 2022-01-11 LAB
BILIRUB BLD-MCNC: NEGATIVE MG/DL
CLARITY, POC: CLEAR
COLOR UR: YELLOW
EXPIRATION DATE: ABNORMAL
GLUCOSE UR STRIP-MCNC: NEGATIVE MG/DL
KETONES UR QL: NEGATIVE
LEUKOCYTE EST, POC: NEGATIVE
Lab: ABNORMAL
NITRITE UR-MCNC: NEGATIVE MG/ML
PH UR: 6 [PH] (ref 5–8)
PROT UR STRIP-MCNC: NEGATIVE MG/DL
RBC # UR STRIP: ABNORMAL /UL
SP GR UR: 1.02 (ref 1–1.03)
UROBILINOGEN UR QL: NORMAL

## 2022-01-11 PROCEDURE — 99214 OFFICE O/P EST MOD 30 MIN: CPT | Performed by: NURSE PRACTITIONER

## 2022-01-11 PROCEDURE — 87086 URINE CULTURE/COLONY COUNT: CPT | Performed by: NURSE PRACTITIONER

## 2022-01-11 RX ORDER — PROMETHAZINE HYDROCHLORIDE 25 MG/1
25 TABLET ORAL EVERY 8 HOURS PRN
Qty: 20 TABLET | Refills: 0 | Status: SHIPPED | OUTPATIENT
Start: 2022-01-11 | End: 2023-01-20

## 2022-01-11 RX ORDER — PHENAZOPYRIDINE HYDROCHLORIDE 200 MG/1
200 TABLET, FILM COATED ORAL 3 TIMES DAILY PRN
Qty: 20 TABLET | Refills: 0 | Status: SHIPPED | OUTPATIENT
Start: 2022-01-11 | End: 2023-01-20

## 2022-01-11 NOTE — PATIENT INSTRUCTIONS
"Textbook of Natural Medicine (5th ed., pp. 3128-7548.e3). [place], [place]: Elsevier.\">   Dietary Guidelines to Help Prevent Kidney Stones  Kidney stones are deposits of minerals and salts that form inside your kidneys. Your risk of developing kidney stones may be greater depending on your diet, your lifestyle, the medicines you take, and whether you have certain medical conditions. Most people can lower their chances of developing kidney stones by following the instructions below. Your dietitian may give you more specific instructions depending on your overall health and the type of kidney stones you tend to develop.  What are tips for following this plan?  Reading food labels    · Choose foods with \"no salt added\" or \"low-salt\" labels. Limit your salt (sodium) intake to less than 1,500 mg a day.  · Choose foods with calcium for each meal and snack. Try to eat about 300 mg of calcium at each meal. Foods that contain 200-500 mg of calcium a serving include:  ? 8 oz (237 mL) of milk, calcium-fortifiednon-dairy milk, and calcium-fortifiedfruit juice. Calcium-fortified means that calcium has been added to these drinks.  ? 8 oz (237 mL) of kefir, yogurt, and soy yogurt.  ? 4 oz (114 g) of tofu.  ? 1 oz (28 g) of cheese.  ? 1 cup (150 g) of dried figs.  ? 1 cup (91 g) of cooked broccoli.  ? One 3 oz (85 g) can of sardines or mackerel.  Most people need 1,000-1,500 mg of calcium a day. Talk to your dietitian about how much calcium is recommended for you.  Shopping  · Buy plenty of fresh fruits and vegetables. Most people do not need to avoid fruits and vegetables, even if these foods contain nutrients that may contribute to kidney stones.  · When shopping for convenience foods, choose:  ? Whole pieces of fruit.  ? Pre-made salads with dressing on the side.  ? Low-fat fruit and yogurt smoothies.  · Avoid buying frozen meals or prepared deli foods. These can be high in sodium.  · Look for foods with live cultures, such as " yogurt and kefir.  · Choose high-fiber grains, such as whole-wheat breads, oat bran, and wheat cereals.  Cooking  · Do not add salt to food when cooking. Place a salt shaker on the table and allow each person to add his or her own salt to taste.  · Use vegetable protein, such as beans, textured vegetable protein (TVP), or tofu, instead of meat in pasta, casseroles, and soups.  Meal planning  · Eat less salt, if told by your dietitian. To do this:  ? Avoid eating processed or pre-made food.  ? Avoid eating fast food.  · Eat less animal protein, including cheese, meat, poultry, or fish, if told by your dietitian. To do this:  ? Limit the number of times you have meat, poultry, fish, or cheese each week. Eat a diet free of meat at least 2 days a week.  ? Eat only one serving each day of meat, poultry, fish, or seafood.  ? When you prepare animal protein, cut pieces into small portion sizes. For most meat and fish, one serving is about the size of the palm of your hand.  · Eat at least five servings of fresh fruits and vegetables each day. To do this:  ? Keep fruits and vegetables on hand for snacks.  ? Eat one piece of fruit or a handful of berries with breakfast.  ? Have a salad and fruit at lunch.  ? Have two kinds of vegetables at dinner.  · Limit foods that are high in a substance called oxalate. These include:  ? Spinach (cooked), rhubarb, beets, sweet potatoes, and Swiss chard.  ? Peanuts.  ? Potato chips, french fries, and baked potatoes with skin on.  ? Nuts and nut products.  ? Chocolate.  · If you regularly take a diuretic medicine, make sure to eat at least 1 or 2 servings of fruits or vegetables that are high in potassium each day. These include:  ? Avocado.  ? Banana.  ? Orange, prune, carrot, or tomato juice.  ? Baked potato.  ? Cabbage.  ? Beans and split peas.  Lifestyle    · Drink enough fluid to keep your urine pale yellow. This is the most important thing you can do. Spread your fluid intake  throughout the day.  · If you drink alcohol:  ? Limit how much you use to:  § 0-1 drink a day for women who are not pregnant.  § 0-2 drinks a day for men.  ? Be aware of how much alcohol is in your drink. In the [place] one drink equals one 12 oz bottle of beer (355 mL), one 5 oz glass of wine (148 mL), or one 1½ oz glass of hard liquor (44 mL).  · Lose weight if told by your health care provider. Work with your dietitian to find an eating plan and weight loss strategies that work best for you.    General information  · Talk to your health care provider and dietitian about taking daily supplements. You may be told the following depending on your health and the cause of your kidney stones:  ? Not to take supplements with vitamin C.  ? To take a calcium supplement.  ? To take a daily probiotic supplement.  ? To take other supplements such as magnesium, fish oil, or vitamin B6.  · Take over-the-counter and prescription medicines only as told by your health care provider. These include supplements.  What foods should I limit?  Limit your intake of the following foods, or eat them as told by your dietitian.  Vegetables  Spinach. Rhubarb. Beets. Canned vegetables. Pickles. Olives. Baked potatoes with skin.  Grains  Wheat bran. Baked goods. Salted crackers. Cereals high in sugar.  Meats and other proteins  Nuts. Nut butters. Large portions of meat, poultry, or fish. Salted, precooked, or cured meats, such as sausages, meat loaves, and hot dogs.  Dairy  Cheese.  Beverages  Regular soft drinks. Regular vegetable juice.  Seasonings and condiments  Seasoning blends with salt. Salad dressings. Soy sauce. Ketchup. Barbecue sauce.  Other foods  Canned soups. Canned pasta sauce. Casseroles. Pizza. Lasagna. Frozen meals. Potato chips. French fries.  The items listed above may not be a complete list of foods and beverages you should limit. Contact a dietitian for more information.  What foods should I avoid?  Talk to your dietitian  about specific foods you should avoid based on the type of kidney stones you have and your overall health.  Fruits  Grapefruit.  The item listed above may not be a complete list of foods and beverages you should avoid. Contact a dietitian for more information.  Summary  · Kidney stones are deposits of minerals and salts that form inside your kidneys.  · You can lower your risk of kidney stones by making changes to your diet.  · The most important thing you can do is drink enough fluid. Drink enough fluid to keep your urine pale yellow.  · Talk to your dietitian about how much calcium you should have each day, and eat less salt and animal protein as told by your dietitian.  This information is not intended to replace advice given to you by your health care provider. Make sure you discuss any questions you have with your health care provider.  Document Revised: 12/10/2020 Document Reviewed: 12/10/2020  [employer] Patient Education © 2021 [employer]      Low-Purine Eating Plan  A low-purine eating plan involves making food choices to limit your intake of purine. Purine is a kind of uric acid. Too much uric acid in your blood can cause certain conditions, such as gout and kidney stones. Eating a low-purine diet can help control these conditions.  What are tips for following this plan?  Reading food labels  · Avoid foods with saturated or Trans fat.  · Check the ingredient list of grains-based foods, such as bread and cereal, to make sure that they contain whole grains.  · Check the ingredient list of sauces or soups to make sure they do not contain meat or fish.  · When choosing soft drinks, check the ingredient list to make sure they do not contain high-fructose corn syrup.  Shopping    · Buy plenty of fresh fruits and vegetables.  · Avoid buying canned or fresh fish.  · Buy dairy products labeled as low-fat or nonfat.  · Avoid buying premade or processed foods. These foods are often high in fat, salt (sodium), and  added sugar.    Cooking  · Use olive oil instead of butter when cooking. Oils like olive oil, canola oil, and sunflower oil contain healthy fats.  Meal planning  · Learn which foods do or do not affect you. If you find out that a food tends to cause your gout symptoms to flare up, avoid eating that food. You can enjoy foods that do not cause problems. If you have any questions about a food item, talk with your dietitian or health care provider.  · Limit foods high in fat, especially saturated fat. Fat makes it harder for your body to get rid of uric acid.  · Choose foods that are lower in fat and are lean sources of protein.  General guidelines  · Limit alcohol intake to no more than 1 drink a day for nonpregnant women and 2 drinks a day for men. One drink equals 12 oz of beer, 5 oz of wine, or 1½ oz of hard liquor. Alcohol can affect the way your body gets rid of uric acid.  · Drink plenty of water to keep your urine clear or pale yellow. Fluids can help remove uric acid from your body.  · If directed by your health care provider, take a vitamin C supplement.  · Work with your health care provider and dietitian to develop a plan to achieve or maintain a healthy weight. Losing weight can help reduce uric acid in your blood.  What foods are recommended?  The items listed may not be a complete list. Talk with your dietitian about what dietary choices are best for you.  Foods low in purines  Foods low in purines do not need to be limited. These include:  · All fruits.  · All low-purine vegetables, pickles, and olives.  · Breads, pasta, rice, cornbread, and popcorn. Cake and other baked goods.  · All dairy foods.  · Eggs, nuts, and nut butters.  · Spices and condiments, such as salt, herbs, and vinegar.  · Plant oils, butter, and margarine.  · Water, sugar-free soft drinks, tea, coffee, and cocoa.  · Vegetable-based soups, broths, sauces, and gravies.  Foods moderate in purines  Foods moderate in purines should be  limited to the amounts listed.  · ½ cup of asparagus, cauliflower, spinach, mushrooms, or green peas, each day.  · 2/3 cup uncooked oatmeal, each day.  · ¼ cup dry wheat bran or wheat germ, each day.  · 2-3 ounces of meat or poultry, each day.  · 4-6 ounces of shellfish, such as crab, lobster, oysters, or shrimp, each day.  · 1 cup cooked beans, peas, or lentils, each day.  · Soup, broths, or bouillon made from meat or fish. Limit these foods as much as possible.  What foods are not recommended?  The items listed may not be a complete list. Talk with your dietitian about what dietary choices are best for you.  Limit your intake of foods high in purines, including:  · Beer and other alcohol.  · Meat-based gravy or sauce.  · Canned or fresh fish, such as:  ? Anchovies, sardines, herring, and tuna.  ? Mussels and scallops.  ? Codfish, trout, and emily.  · [place].  · Organ meats, such as:  ? Liver or kidney.  ? Tripe.  ? Sweetbreads (thymus gland or pancreas).  · Wild game or goose.  · Yeast or yeast extract supplements.  · Drinks sweetened with high-fructose corn syrup.  Summary  · Eating a low-purine diet can help control conditions caused by too much uric acid in the body, such as gout or kidney stones.  · Choose low-purine foods, limit alcohol, and limit foods high in fat.  · You will learn over time which foods do or do not affect you. If you find out that a food tends to cause your gout symptoms to flare up, avoid eating that food.  This information is not intended to replace advice given to you by your health care provider. Make sure you discuss any questions you have with your health care provider.  Document Revised: 04/01/2021 Document Reviewed: 04/01/2021  [employer] Patient Education © 2021 [employer]    Hematuria, Adult  Hematuria is blood in the urine. Blood may be visible in the urine, or it may be identified with a test. This condition can be caused by infections of the bladder, urethra, kidney, or  prostate. Other possible causes include:  · Kidney stones.  · Cancer of the urinary tract.  · Too much calcium in the urine.  · Conditions that are passed from parent to child (inherited conditions).  · Exercise that requires a lot of energy.  Infections can usually be treated with medicine, and a kidney stone usually will pass through your urine. If neither of these is the cause of your hematuria, more tests may be needed to identify the cause of your symptoms.  It is very important to tell your health care provider about any blood in your urine, even if it is painless or the blood stops without treatment. Blood in the urine, when it happens and then stops and then happens again, can be a symptom of a very serious condition, including cancer. There is no pain in the initial stages of many urinary cancers.  Follow these instructions at home:  Medicines  · Take over-the-counter and prescription medicines only as told by your health care provider.  · If you were prescribed an antibiotic medicine, take it as told by your health care provider. Do not stop taking the antibiotic even if you start to feel better.  Eating and drinking  · Drink enough fluid to keep your urine clear or pale yellow. It is recommended that you drink 3-4 quarts (2.8-3.8 L) a day. If you have been diagnosed with an infection, it is recommended that you drink cranberry juice in addition to large amounts of water.  · Avoid caffeine, tea, and carbonated beverages. These tend to irritate the bladder.  · Avoid alcohol because it may irritate the prostate (men).  General instructions  · If you have been diagnosed with a kidney stone, follow your health care provider's instructions about straining your urine to catch the stone.  · Empty your bladder often. Avoid holding urine for long periods of time.  · If you are female:  ? After a bowel movement, wipe from front to back and use each piece of toilet paper only once.  ? Empty your bladder before and  after sex.  · Pay attention to any changes in your symptoms. Tell your health care provider about any changes or any new symptoms.  · It is your responsibility to get your test results. Ask your health care provider, or the department performing the test, when your results will be ready.  · Keep all follow-up visits as told by your health care provider. This is important.  Contact a health care provider if:  · You develop back pain.  · You have a fever.  · You have nausea or vomiting.  · Your symptoms do not improve after 3 days.  · Your symptoms get worse.  Get help right away if:  · You develop severe vomiting and are unable take medicine without vomiting.  · You develop severe pain in your back or abdomen even though you are taking medicine.  · You pass a large amount of blood in your urine.  · You pass blood clots in your urine.  · You feel very weak or like you might faint.  · You faint.  Summary  · Hematuria is blood in the urine. It has many possible causes.  · It is very important that you tell your health care provider about any blood in your urine, even if it is painless or the blood stops without treatment.  · Take over-the-counter and prescription medicines only as told by your health care provider.  · Drink enough fluid to keep your urine clear or pale yellow.  This information is not intended to replace advice given to you by your health care provider. Make sure you discuss any questions you have with your health care provider.  Document Revised: 05/13/2020 Document Reviewed: 01/20/2018  [email or web address] Patient Education © 2021 [employer]  Flank Pain, Adult  Flank pain is pain in your side. The flank is the area of your side between your upper belly (abdomen) and your back. The pain may occur over a short time (acute), or it may be long-term or come back often (chronic). It may be mild or very bad. Pain in this area can be caused by many different things.  Follow these instructions at home:    · Drink  enough fluid to keep your pee (urine) clear or pale yellow.  · Rest as told by your doctor.  · Take over-the-counter and prescription medicines only as told by your doctor.  · Keep a journal to keep track of:  ? What has caused your flank pain.  ? What has made it feel better.  · Keep all follow-up visits as told by your doctor. This is important.  Contact a doctor if:  · Medicine does not help your pain.  · You have new symptoms.  · Your pain gets worse.  · You have a fever.  · Your symptoms last longer than 2-3 days.  · You have trouble peeing.  · You are peeing more often than normal.  Get help right away if:  · You have trouble breathing.  · You are short of breath.  · Your belly hurts, or it is swollen or red.  · You feel sick to your stomach (nauseous).  · You throw up (vomit).  · You feel like you will pass out, or you do pass out (faint).  · You have blood in your pee.  Summary  · Flank pain is pain in your side. The flank is the area of your side between your upper belly (abdomen) and your back.  · Flank pain may occur over a short time (acute), or it may be long-term or come back often (chronic). It may be mild or very bad.  · Pain in this area can be caused by many different things.  · Contact your doctor if your symptoms get worse or they last longer than 2-3 days.  This information is not intended to replace advice given to you by your health care provider. Make sure you discuss any questions you have with your health care provider.  Document Revised: 11/30/2018 Document Reviewed: 04/09/2018  [employer] Patient Education © 2021 [employer]  Interstitial Cystitis    Interstitial cystitis is inflammation of the bladder. This condition is also known as painful bladder syndrome. This may cause pain in the bladder area as well as a frequent and urgent need to urinate. The bladder is an organ that stores urine after the urine is made in the kidneys.  The severity of interstitial cystitis can vary from person  to person. You may have flare-ups, and then your symptoms may go away for a while. For many people, it becomes a long-term (chronic) problem.  What are the causes?  The cause of this condition is not known.  What increases the risk?  The following factors may make you more likely to develop this condition:  · You are female.  · You have fibromyalgia.  · You have irritable bowel syndrome (IBS).  · You have endometriosis.  This condition may be aggravated by:  · Stress.  · Smoking.  · Spicy foods.  What are the signs or symptoms?  Symptoms of interstitial cystitis vary, and they can change over time. Symptoms may include:  · Discomfort or pain in the bladder area, which is in the lower abdomen. Pain can range from mild to severe. The pain may change in intensity as the bladder fills with urine or as it empties.  · Pain in the pelvic area, between the hip bones.  · An urgent need to urinate.  · Frequent urination.  · Pain during urination.  · Pain during sex.  · Blood in the urine.  · Fatigue.  For women, symptoms often get worse during menstruation.  How is this diagnosed?  This condition is diagnosed based on your symptoms, your medical history, and a physical exam. You may have tests to rule out other conditions, such as:  · Urine tests.  · Cystoscopy. For this test, a tool similar to a very thin telescope is used to look into your bladder.  · Biopsy. This involves taking a sample of tissue from the bladder to be examined under a microscope.  How is this treated?  There is no cure for this condition, but treatment can help you control your symptoms. Work closely with your health care provider to find the most effective treatments for you. Treatment options may include:  · Medicines to relieve pain and reduce how often you feel the need to urinate. This treatment may include:  ? A procedure where a small amount of medicine that eases irritation is put inside your bladder through a catheter (bladder  instillation).  · Lifestyle changes, such as changing your diet or taking steps to control stress.  · Physical therapy. This may include:  ? Exercises to help relax the pelvic floor muscles.  ? Massage to relax tight muscles (myofascial release).  · Learning ways to control when you urinate (bladder training).  · Using a device that provides electrical stimulation to your nerves, which can relieve pain (neuromodulation therapy). The device is placed on your back, where it blocks the nerves that cause you to feel pain in your bladder area.  · A procedure that stretches your bladder by filling it with air or fluid (hydrodistention).  · Surgery. This is rare. It is only done for extreme cases, if other treatments do not help.  Follow these instructions at home:  Lifestyle  · Learn and practice relaxation techniques, such as deep breathing and muscle relaxation.  · Get care for your body and mental well-being, such as:  ? Cognitive behavioral therapy (CBT). This therapy changes the way you think or act in response to the fatigue. This may help improve how you feel.  ? Seeing a mental health therapist to evaluate and treat depression, if necessary.  · Work with your health care provider on other ways to manage pain. Acupuncture may be helpful.  · Avoid drinking alcohol.  · Do not use any products that contain nicotine or tobacco, such as cigarettes, e-cigarettes, and chewing tobacco. If you need help quitting, ask your health care provider.  Eating and drinking  · Make dietary changes as recommended by your health care provider. You may need to avoid:  ? Spicy foods.  ? Foods that contain a lot of potassium.  · Limit your intake of drinks that make you need to urinate. These include:  ? Caffeinated drinks like soda, coffee, and tea.  ? Alcohol.  Bladder training    · Use bladder training techniques as directed. Techniques may include:  ? Urinating at scheduled times.  ? Training yourself to delay urination.  · Keep a  bladder diary.  ? Write down the times that you urinate and any symptoms that you have. This can help you find out which foods, liquids, or activities make your symptoms worse.  ? Use your bladder diary to schedule bathroom trips. If you are away from home, plan to be near a bathroom at each of your scheduled times.  · Make sure that you urinate just before you leave the house and just before you go to bed.    General instructions  · Take over-the-counter and prescription medicines only as told by your health care provider.  · You can try a warm or cool compress over your bladder for comfort.  · Avoid wearing tight clothing.  · Do exercises to relax your pelvic floor muscles as told by your physical therapist.  · Keep all follow-up visits as told by your health care provider. This is important.  Where to find more information  To find more information or a support group near you, visit:  · [facility]: urologyhealth.org  · Interstitial Cystitis Association: ichelp.org  Contact a health care provider if you have:  · Symptoms that do not get better with treatment.  · Pain or discomfort that gets worse.  · More frequent urges to urinate.  · A fever.  Get help right away if:  · You have no control over when you urinate.  Summary  · Interstitial cystitis is inflammation of the bladder.  · This condition may cause pain in the bladder area as well as a frequent and urgent need to urinate.  · You may have flare-ups of the condition, and then it may go away for a while. For many people, it becomes a long-term (chronic) problem.  · There is no cure for interstitial cystitis, but treatment methods are available to control your symptoms.  This information is not intended to replace advice given to you by your health care provider. Make sure you discuss any questions you have with your health care provider.  Document Revised: 02/03/2021 Document Reviewed: 11/12/2018  [employer] Patient Education © 2021 [employer]

## 2022-01-11 NOTE — PROGRESS NOTES
"Chief Complaint  Acute cystitis with Hematuria/IC/Flank pain/Nephrolithiasis (3 month follow up )    Pool Edouard presents to [facility]  History of Present Illness    Ms. Briseyda Edouard  is a pleasant 50-year-old patient, who presents for  EVALAUATION today.  This is her 3 month follow up FOR Acute cystitis with Hematuria, Flank Pain and recurrent UTIs. She is here to discuss Bladder [place] distention procedure for her IC but reports wants to wait till summertime.  She feels down but reports she is in no apparent distress and reports doing relatively well .  She denies any urinary/kidney stone symptoms today. No bladder discomfort, feels menopausal she states, with hot/cold flashes, night sweats and minimal sleep with worsening nocturia 4-5x. She feels like straining with urination.     Today, she however denies dysuria,or any burning with urination, Denies any kidney stone related pain,flank pain, she denies back pain, or any CVA tenderness or urinary symptoms of  frequency,urgency or gross hematuria. Her Urine dipstick today show trace leukocyte estrace, otherwise is completely negative for any infection.  It is negative for gross hematuria.  She has 3+ microscopic hematuria which is chronic for her(states she has always had microscopic hematuria for over 20+ years).     Overall, she reports doing relatively better. She is currently NOT taking any more antibiotics or probiotics. She reports increasing her PO fluid intake to at least 2-3 liters daily, and is avoiding any bladder irritants.    Objective   Vital Signs:   Ht 170.2 cm (67.01\")   Wt 117 kg (257 lb)   BMI 40.24 kg/m²     Physical Exam  Constitutional:       General: She is in acute distress.      Appearance: She is well-developed. She is obese. She is ill-appearing.   HENT:      Head: Normocephalic and atraumatic.   Eyes:      Pupils: Pupils are equal, round, and reactive to light. "   Neck:      Thyroid: No thyromegaly.      Trachea: No tracheal deviation.   Cardiovascular:      Rate and Rhythm: Normal rate and regular rhythm.      Heart sounds: No murmur heard.      Pulmonary:      Effort: Pulmonary effort is normal. No respiratory distress.      Breath sounds: Normal breath sounds. No stridor. No wheezing.   Abdominal:      General: Bowel sounds are normal. There is distension.      Palpations: Abdomen is soft.      Tenderness: There is abdominal tenderness.   Genitourinary:     Labia:         Right: No tenderness.         Left: No tenderness.       Vagina: Normal. No vaginal discharge.   Musculoskeletal:         General: Tenderness present. No deformity. Normal range of motion.      Cervical back: Normal range of motion.   Skin:     General: Skin is warm and dry.      Capillary Refill: Capillary refill takes less than 2 seconds.      Coloration: Skin is pale.      Findings: No erythema or rash.   Neurological:      Mental Status: She is alert and oriented to person, place, and time.      Cranial Nerves: No cranial nerve deficit.      Sensory: No sensory deficit.      Coordination: Coordination normal.   Psychiatric:         Behavior: Behavior normal.         Thought Content: Thought content normal.         Judgment: Judgment normal.        Result Review :                 Assessment and Plan    Diagnoses and all orders for this visit:    1. Acute cystitis without hematuria (Primary)  -     Urine Culture - Urine, Urine, Clean Catch  -     promethazine (PHENERGAN) 25 MG tablet; Take 1 tablet by mouth Every 8 (Eight) Hours As Needed for Nausea or Vomiting.  Dispense: 20 tablet; Refill: 0  -     phenazopyridine (Pyridium) 200 MG tablet; Take 1 tablet by mouth 3 (Three) Times a Day As Needed for Bladder Spasms.  Dispense: 20 tablet; Refill: 0    2. Detrusor instability  -     promethazine (PHENERGAN) 25 MG tablet; Take 1 tablet by mouth Every 8 (Eight) Hours As Needed for Nausea or Vomiting.   Dispense: 20 tablet; Refill: 0  -     phenazopyridine (Pyridium) 200 MG tablet; Take 1 tablet by mouth 3 (Three) Times a Day As Needed for Bladder Spasms.  Dispense: 20 tablet; Refill: 0    3. Gross hematuria  -     Urine Culture - Urine, Urine, Clean Catch    4. Right ureteral stone  -     Urine Culture - Urine, Urine, Clean Catch  -     promethazine (PHENERGAN) 25 MG tablet; Take 1 tablet by mouth Every 8 (Eight) Hours As Needed for Nausea or Vomiting.  Dispense: 20 tablet; Refill: 0    5. Dysuria  -     POC Urinalysis Dipstick, Automated  -     Urine Culture - Urine, Urine, Clean Catch  -     promethazine (PHENERGAN) 25 MG tablet; Take 1 tablet by mouth Every 8 (Eight) Hours As Needed for Nausea or Vomiting.  Dispense: 20 tablet; Refill: 0  -     phenazopyridine (Pyridium) 200 MG tablet; Take 1 tablet by mouth 3 (Three) Times a Day As Needed for Bladder Spasms.  Dispense: 20 tablet; Refill: 0    6. Urge incontinence of urine  -     promethazine (PHENERGAN) 25 MG tablet; Take 1 tablet by mouth Every 8 (Eight) Hours As Needed for Nausea or Vomiting.  Dispense: 20 tablet; Refill: 0    7. Detrusor hyperreflexia  -     promethazine (PHENERGAN) 25 MG tablet; Take 1 tablet by mouth Every 8 (Eight) Hours As Needed for Nausea or Vomiting.  Dispense: 20 tablet; Refill: 0    8. Frequency of micturition  -     promethazine (PHENERGAN) 25 MG tablet; Take 1 tablet by mouth Every 8 (Eight) Hours As Needed for Nausea or Vomiting.  Dispense: 20 tablet; Refill: 0    9. Right flank pain, chronic  -     promethazine (PHENERGAN) 25 MG tablet; Take 1 tablet by mouth Every 8 (Eight) Hours As Needed for Nausea or Vomiting.  Dispense: 20 tablet; Refill: 0                                       ASSESSMENT/PLAN  Interstitial Cystitis-AGAIN, We discussed the diagnosis and management of this condition.  I indicated that it was a multifactorial condition with multifactorial symptomatology, Including frequency, urgency, the sensation of urinary  tract infection in the absence of a positive culture, sexual symptomatology including significant dyspareunia.     We discussed treatment options including the importance of making a clinical diagnosis and the cystoscopic findings including Hunner's ulcers etc.  We also discussed medical management including pharmacologic treatment such as amitriptyline, naturopathic treatments such as pumpkin oil and which more aggressive options of Botox injections as well as the relative risks and merits of this.  We also discussed the use of dietary manipulation including the over-the-counter product relief which basically decreases the acid in the urine and avoidance of ascitic-containing foods such as citrus is etc.     Discussed a kidney stone prevention diet to include increasing p.o. fluid intake, to at least 1 to 2 L of water daily.  She is to avoid caffeine products such as cola, coffee, and to avoid soft or soda drinks.  She is to decrease her sodium consumption as in  Fast foods, reeves, salted nuts, canned foods, and smoked/cured foods. She is also to decrease her oxalate consumption, as in spinach, Catie greens, and Rhubarb.  Also important is to decrease protein intake, as in red meats, peanut butter, and also avoid nuts.     Recurrent urinary tract infections/OverActiveBladder/Atrophic/yeast vaginitis: She is in no apparent distress and reports a remarkable improvement in her overall symptoms. She is happy to be feeling better she states. She reports doing relatively better on her antibiotic prophylaxis, but did stop 3 months ago and still feels better. We will treat any UTIS as the need be.      Again,  We discussed the types of organisms that are found in the urinary tract indicating that the vast majority are results of the patient's own gastrointestinal jose angel.  We discussed how many of the antibiotics that are utilized can actually exacerbate these infections by creating resistant organisms and there is only a  very few antibiotics that are concentrated in the urine and do not affect the rectal reservoir nor cause recurrent yeast vaginitis.     Finally,We discussed the risk factors for recurrent infections being intercourse in younger patients and atrophic changes in older patients. We discussed the symptoms that are found including pain, pressure, burning, frequency, urgency suprapubic pain and painful intercourse.  I discussed upper tract symptoms including fevers, chills, and indicated the workup would be much more aggressive if the patient were to present with recurrent infections in the face of upper tract symptomatology such as fever. I discussed the history of vesicoureteral reflux in young patients and finally chronic renal scarring as a result of such.                                                      PLAN  We resent her urine for culture. I will call her with results if any bacteria growth not sensitive to her current therapy of Macrobid..     Will discontinue Macrobid 100 mg PO Daily -Suppressive Therapy.     She has been encouraged to increase her p.o. fluid intake to at least 1 to 2 L daily and avoid bladder irritants such as caffeine products, spicy foods, and citrusy foods.      I recommend concomitant probiotics with treatment with antibiotics to protect the rectal reservoir including over-the-counter yogurt preparations to shannan oral pills containing the appropriate probiotics. Patient reports the diligent use of Probiotics.     She is to also continue other medications for yeast vaginitis, atrophic vaginitis as prescribed above.     Will see her back in 6 months for Follow up in clinic and recheck her urinalysis at that time.     WILL DISCUSS REPEATING BLADDER HYDRODISTENTION PROCEDURE AT THAT TIME/BOTOX.     Patient is agreeable to plan of care.     Follow Up   Return in about 6 months (around 7/11/2022) for Next scheduled follow up,IC/FLANK PAIN/ RECURRENT UTI/DYSURIA/DETRUSSOR INSTABILITY.      Patient was given instructions and counseling regarding her condition or for health maintenance advice. Please see specific information pulled into the AVS if appropriate.

## 2022-01-12 LAB — BACTERIA SPEC AEROBE CULT: NO GROWTH

## 2022-03-16 ENCOUNTER — HOSPITAL ENCOUNTER (EMERGENCY)
Facility: HOSPITAL | Age: 52
Discharge: HOME | End: 2022-03-16
Attending: EMERGENCY MEDICINE | Admitting: EMERGENCY MEDICINE

## 2022-03-16 ENCOUNTER — APPOINTMENT (OUTPATIENT)
Dept: CT IMAGING | Facility: HOSPITAL | Age: 52
End: 2022-03-16

## 2022-03-16 VITALS
WEIGHT: 260 LBS | DIASTOLIC BLOOD PRESSURE: 52 MMHG | BODY MASS INDEX: 40.81 KG/M2 | SYSTOLIC BLOOD PRESSURE: 132 MMHG | TEMPERATURE: 97.5 F | HEART RATE: 78 BPM | OXYGEN SATURATION: 96 % | HEIGHT: 67 IN | RESPIRATION RATE: 18 BRPM

## 2022-03-16 DIAGNOSIS — M54.32 SCIATICA OF LEFT SIDE: Primary | ICD-10-CM

## 2022-03-16 LAB
ALBUMIN SERPL-MCNC: 3.85 G/DL (ref 3.5–5.2)
ALBUMIN/GLOB SERPL: 1.4 G/DL
ALP SERPL-CCNC: 95 U/L (ref 39–117)
ALT SERPL W P-5'-P-CCNC: 12 U/L (ref 1–33)
ANION GAP SERPL CALCULATED.3IONS-SCNC: 8.1 MMOL/L (ref 5–15)
AST SERPL-CCNC: 19 U/L (ref 1–32)
BACTERIA UR QL AUTO: ABNORMAL /HPF
BASOPHILS # BLD AUTO: 0.09 10*3/MM3 (ref 0–0.2)
BASOPHILS NFR BLD AUTO: 0.8 % (ref 0–1.5)
BILIRUB SERPL-MCNC: 0.2 MG/DL (ref 0–1.2)
BILIRUB UR QL STRIP: NEGATIVE
BUN SERPL-MCNC: 18 MG/DL (ref 6–20)
BUN/CREAT SERPL: 15.4 (ref 7–25)
CALCIUM SPEC-SCNC: 9.1 MG/DL (ref 8.6–10.5)
CHLORIDE SERPL-SCNC: 108 MMOL/L (ref 98–107)
CLARITY UR: CLEAR
CO2 SERPL-SCNC: 21.9 MMOL/L (ref 22–29)
COLOR UR: YELLOW
CREAT SERPL-MCNC: 1.17 MG/DL (ref 0.57–1)
CRP SERPL-MCNC: 0.5 MG/DL (ref 0–0.5)
DEPRECATED RDW RBC AUTO: 43.8 FL (ref 37–54)
EGFRCR SERPLBLD CKD-EPI 2021: 57.3 ML/MIN/1.73
EOSINOPHIL # BLD AUTO: 0.18 10*3/MM3 (ref 0–0.4)
EOSINOPHIL NFR BLD AUTO: 1.6 % (ref 0.3–6.2)
ERYTHROCYTE [DISTWIDTH] IN BLOOD BY AUTOMATED COUNT: 13.1 % (ref 12.3–15.4)
ERYTHROCYTE [SEDIMENTATION RATE] IN BLOOD: 15 MM/HR (ref 0–20)
GLOBULIN UR ELPH-MCNC: 2.8 GM/DL
GLUCOSE SERPL-MCNC: 95 MG/DL (ref 65–99)
GLUCOSE UR STRIP-MCNC: NEGATIVE MG/DL
HCT VFR BLD AUTO: 39.7 % (ref 34–46.6)
HGB BLD-MCNC: 12.9 G/DL (ref 12–15.9)
HGB UR QL STRIP.AUTO: ABNORMAL
HOLD SPECIMEN: NORMAL
HOLD SPECIMEN: NORMAL
HYALINE CASTS UR QL AUTO: ABNORMAL /LPF
IMM GRANULOCYTES # BLD AUTO: 0.1 10*3/MM3 (ref 0–0.05)
IMM GRANULOCYTES NFR BLD AUTO: 0.9 % (ref 0–0.5)
KETONES UR QL STRIP: ABNORMAL
LEUKOCYTE ESTERASE UR QL STRIP.AUTO: ABNORMAL
LYMPHOCYTES # BLD AUTO: 3.4 10*3/MM3 (ref 0.7–3.1)
LYMPHOCYTES NFR BLD AUTO: 30 % (ref 19.6–45.3)
MCH RBC QN AUTO: 29.7 PG (ref 26.6–33)
MCHC RBC AUTO-ENTMCNC: 32.5 G/DL (ref 31.5–35.7)
MCV RBC AUTO: 91.3 FL (ref 79–97)
MONOCYTES # BLD AUTO: 0.86 10*3/MM3 (ref 0.1–0.9)
MONOCYTES NFR BLD AUTO: 7.6 % (ref 5–12)
NEUTROPHILS NFR BLD AUTO: 59.1 % (ref 42.7–76)
NEUTROPHILS NFR BLD AUTO: 6.72 10*3/MM3 (ref 1.7–7)
NITRITE UR QL STRIP: NEGATIVE
NRBC BLD AUTO-RTO: 0 /100 WBC (ref 0–0.2)
PH UR STRIP.AUTO: 5.5 [PH] (ref 5–8)
PLATELET # BLD AUTO: 331 10*3/MM3 (ref 140–450)
PMV BLD AUTO: 9.6 FL (ref 6–12)
POTASSIUM SERPL-SCNC: 4.4 MMOL/L (ref 3.5–5.2)
PROT SERPL-MCNC: 6.6 G/DL (ref 6–8.5)
PROT UR QL STRIP: NEGATIVE
RBC # BLD AUTO: 4.35 10*6/MM3 (ref 3.77–5.28)
RBC # UR STRIP: ABNORMAL /HPF
REF LAB TEST METHOD: ABNORMAL
SODIUM SERPL-SCNC: 138 MMOL/L (ref 136–145)
SP GR UR STRIP: 1.02 (ref 1–1.03)
SQUAMOUS #/AREA URNS HPF: ABNORMAL /HPF
UROBILINOGEN UR QL STRIP: ABNORMAL
WBC # UR STRIP: ABNORMAL /HPF
WBC NRBC COR # BLD: 11.35 10*3/MM3 (ref 3.4–10.8)
WHOLE BLOOD HOLD SPECIMEN: NORMAL
WHOLE BLOOD HOLD SPECIMEN: NORMAL

## 2022-03-16 PROCEDURE — 36415 COLL VENOUS BLD VENIPUNCTURE: CPT

## 2022-03-16 PROCEDURE — 81001 URINALYSIS AUTO W/SCOPE: CPT | Performed by: PHYSICIAN ASSISTANT

## 2022-03-16 PROCEDURE — 85025 COMPLETE CBC W/AUTO DIFF WBC: CPT | Performed by: PHYSICIAN ASSISTANT

## 2022-03-16 PROCEDURE — 86140 C-REACTIVE PROTEIN: CPT | Performed by: PHYSICIAN ASSISTANT

## 2022-03-16 PROCEDURE — 99283 EMERGENCY DEPT VISIT LOW MDM: CPT

## 2022-03-16 PROCEDURE — 25010000002 KETOROLAC TROMETHAMINE PER 15 MG: Performed by: EMERGENCY MEDICINE

## 2022-03-16 PROCEDURE — 80053 COMPREHEN METABOLIC PANEL: CPT | Performed by: PHYSICIAN ASSISTANT

## 2022-03-16 PROCEDURE — 72131 CT LUMBAR SPINE W/O DYE: CPT

## 2022-03-16 PROCEDURE — 96372 THER/PROPH/DIAG INJ SC/IM: CPT

## 2022-03-16 PROCEDURE — 72131 CT LUMBAR SPINE W/O DYE: CPT | Performed by: RADIOLOGY

## 2022-03-16 PROCEDURE — 25010000002 ORPHENADRINE CITRATE PER 60 MG: Performed by: PHYSICIAN ASSISTANT

## 2022-03-16 PROCEDURE — 85652 RBC SED RATE AUTOMATED: CPT | Performed by: PHYSICIAN ASSISTANT

## 2022-03-16 PROCEDURE — 25010000002 METHYLPREDNISOLONE PER 125 MG: Performed by: PHYSICIAN ASSISTANT

## 2022-03-16 PROCEDURE — 25010000002 MORPHINE PER 10 MG: Performed by: EMERGENCY MEDICINE

## 2022-03-16 RX ORDER — METHYLPREDNISOLONE 4 MG/1
TABLET ORAL
Qty: 21 TABLET | Refills: 0 | Status: SHIPPED | OUTPATIENT
Start: 2022-03-16 | End: 2023-01-20

## 2022-03-16 RX ORDER — METHYLPREDNISOLONE SODIUM SUCCINATE 125 MG/2ML
80 INJECTION, POWDER, LYOPHILIZED, FOR SOLUTION INTRAMUSCULAR; INTRAVENOUS ONCE
Status: COMPLETED | OUTPATIENT
Start: 2022-03-16 | End: 2022-03-16

## 2022-03-16 RX ORDER — ORPHENADRINE CITRATE 30 MG/ML
60 INJECTION INTRAMUSCULAR; INTRAVENOUS ONCE
Status: COMPLETED | OUTPATIENT
Start: 2022-03-16 | End: 2022-03-16

## 2022-03-16 RX ORDER — KETOROLAC TROMETHAMINE 30 MG/ML
30 INJECTION, SOLUTION INTRAMUSCULAR; INTRAVENOUS ONCE
Status: DISCONTINUED | OUTPATIENT
Start: 2022-03-16 | End: 2022-03-16

## 2022-03-16 RX ORDER — DICLOFENAC SODIUM 75 MG/1
75 TABLET, DELAYED RELEASE ORAL 2 TIMES DAILY
Qty: 14 TABLET | Refills: 0 | Status: SHIPPED | OUTPATIENT
Start: 2022-03-16 | End: 2023-01-20

## 2022-03-16 RX ORDER — ORPHENADRINE CITRATE 100 MG/1
100 TABLET, EXTENDED RELEASE ORAL 2 TIMES DAILY PRN
Qty: 14 TABLET | Refills: 0 | Status: SHIPPED | OUTPATIENT
Start: 2022-03-16 | End: 2023-01-20

## 2022-03-16 RX ORDER — KETOROLAC TROMETHAMINE 30 MG/ML
30 INJECTION, SOLUTION INTRAMUSCULAR; INTRAVENOUS ONCE
Status: COMPLETED | OUTPATIENT
Start: 2022-03-16 | End: 2022-03-16

## 2022-03-16 RX ADMIN — ORPHENADRINE CITRATE 60 MG: 30 INJECTION INTRAMUSCULAR; INTRAVENOUS at 17:11

## 2022-03-16 RX ADMIN — MORPHINE SULFATE 4 MG: 4 INJECTION, SOLUTION INTRAMUSCULAR; INTRAVENOUS at 19:03

## 2022-03-16 RX ADMIN — METHYLPREDNISOLONE SODIUM SUCCINATE 80 MG: 125 INJECTION, POWDER, FOR SOLUTION INTRAMUSCULAR; INTRAVENOUS at 17:09

## 2022-03-16 RX ADMIN — KETOROLAC TROMETHAMINE 30 MG: 30 INJECTION, SOLUTION INTRAMUSCULAR at 17:12

## 2022-03-16 NOTE — ED PROVIDER NOTES
Subjective   51-year-old female with past medical history of anxiety, depression, arthritis, melanoma, diverticulitis, hiatal hernia, hypertension, & nephrolithiasis resents to the emergency room with severe left lower back pain for the past few days.  Patient states on March 4 she had her nerve splint for arthritis by her pain doctor at [facility] and states on the seventh she felt much improved therefore help someone move heavy furniture and began having severe pain at that time.  She denies urinary retention, fecal incontinence, lower extremity weakness, history or current use of IV drugs, or saddle anesthesia.  She denies any alleviating factors despite heat therapy, cryotherapy, ibuprofen, Tylenol, and lidocaine patches.  She denies any other complaints or concerns at this time.      History provided by:  Patient   used: No        Review of Systems   Constitutional: Negative.  Negative for fever.   HENT: Negative.    Respiratory: Negative.    Cardiovascular: Negative.  Negative for chest pain.   Gastrointestinal: Negative.  Negative for abdominal pain.   Endocrine: Negative.    Genitourinary: Negative.  Negative for dysuria.   Musculoskeletal: Positive for back pain.   Skin: Negative.    Neurological: Negative.    Psychiatric/Behavioral: Negative.    All other systems reviewed and are negative.      Past Medical History:   Diagnosis Date   • Anxiety and depression    • Arthritis    • Body piercing     both ears   • Cancer (HCC)     MELANOMA-right breast, back   • Diverticulitis    • Hiatal hernia    • History of being tatooed    • Hot flashes    • Hypertension    • Kidney stones    • Menstrual changes    • Pelvic pain    • Pneumonia    • PONV (postoperative nausea and vomiting)    • Urinary incontinence    • Vitamin D deficiency    • Wears glasses     for reading       Allergies   Allergen Reactions   • Penicillins Anaphylaxis       Past Surgical History:   Procedure Laterality  Date   • ABDOMINAL SURGERY     • BLADDER SURGERY      sling x 2   •  SECTION      x1   • CHOLECYSTECTOMY     • COLONOSCOPY     • CYSTOSCOPY URETEROSCOPY LASER LITHOTRIPSY Right 2021    Procedure: CYSTOSCOPY URETEROSCOPY RETROGRADE RIGHT, URETEROGRAM;  Surgeon: Steven Wayne MD;  Location: [facility];  Service: Urology;  Laterality: Right;   • ENDOSCOPY     • INTERSTIM REMOVAL N/A 10/23/2019    Procedure: INTERSTIM REMOVAL;  Surgeon: Jose Adams MD;  Location: [facility] OR;  Service: Urology   • SEPTOPLASTY     • SKIN BIOPSY     • TONSILLECTOMY AND ADENOIDECTOMY     • TUBAL ABDOMINAL LIGATION         Family History   Problem Relation Age of Onset   • Cancer Mother         renal cell carconma   • Diabetes Father    • Hypertension Father        Social History     Socioeconomic History   • Marital status:    Tobacco Use   • Smoking status: Current Every Day Smoker     Packs/day: 0.50     Years: 30.00     Pack years: 15.00     Types: Cigarettes   • Smokeless tobacco: Never Used   Vaping Use   • Vaping Use: Some days   • Substances: Nicotine, Flavoring   • Devices: Disposable   Substance and Sexual Activity   • Alcohol use: No   • Drug use: No   • Sexual activity: Defer           Objective   Physical Exam  Vitals and nursing note reviewed.   Constitutional:       General: She is not in acute distress.     Appearance: She is well-developed. She is not diaphoretic.   HENT:      Head: Normocephalic and atraumatic.      Right Ear: External ear normal.      Left Ear: External ear normal.      Nose: Nose normal.   Eyes:      Conjunctiva/sclera: Conjunctivae normal.      Pupils: Pupils are equal, round, and reactive to light.   Neck:      Vascular: No JVD.      Trachea: No tracheal deviation.   Cardiovascular:      Rate and Rhythm: Normal rate and regular rhythm.      Heart sounds: Normal heart sounds. No murmur heard.  Pulmonary:      Effort: Pulmonary effort is normal. No respiratory  distress.      Breath sounds: Normal breath sounds. No wheezing.   Abdominal:      General: Bowel sounds are normal.      Palpations: Abdomen is soft.      Tenderness: There is no abdominal tenderness.   Musculoskeletal:         General: No deformity. Normal range of motion.      Cervical back: Normal, normal range of motion and neck supple.      Thoracic back: Normal.      Lumbar back: Spasms and tenderness present.        Back:    Skin:     General: Skin is warm and dry.      Coloration: Skin is not pale.      Findings: No erythema or rash.   Neurological:      Mental Status: She is alert and oriented to person, place, and time.      Cranial Nerves: No cranial nerve deficit.   Psychiatric:         Behavior: Behavior normal.         Thought Content: Thought content normal.         Procedures           ED Course  ED Course as of 03/16/22 2317   Wed Mar 16, 2022   1839 CT Lumbar Spine Without Contrast [TK]      ED Course User Index  [TK] Nia Luciano PA-C                                                 MDM  Number of Diagnoses or Management Options  Sciatica of left side: new and requires workup     Amount and/or Complexity of Data Reviewed  Clinical lab tests: reviewed and ordered  Tests in the radiology section of CPT®: reviewed and ordered    Risk of Complications, Morbidity, and/or Mortality  Presenting problems: moderate  Diagnostic procedures: moderate  Management options: moderate    Patient Progress  Patient progress: stable      Final diagnoses:   Sciatica of left side       ED Disposition  ED Disposition     ED Disposition   Discharge    Condition   Stable    Comment   --             Celine Chacon PA-C  [address]  [address] [address] [phone]    In 2 days           Medication List      New Prescriptions    diclofenac 75 MG EC tablet  Commonly known as: VOLTAREN  Take 1 tablet by mouth 2 (Two) Times a Day.     methylPREDNISolone 4 MG dose pack  Commonly known as: MEDROL  Take as  directed on package instructions.     orphenadrine 100 MG 12 hr tablet  Commonly known as: NORFLEX  Take 1 tablet by mouth 2 (Two) Times a Day As Needed for Muscle Spasms or Mild Pain .           Where to Get Your Medications      These medications were sent to [facility], [place] - [address] - [phone]  - [phone]   [address], [facility] [address]    Phone: [phone]   · diclofenac 75 MG EC tablet  · methylPREDNISolone 4 MG dose pack  · orphenadrine 100 MG 12 hr tablet          Nia Luciano PAMoisesC  03/16/22 2194

## 2022-07-12 ENCOUNTER — OFFICE VISIT (OUTPATIENT)
Dept: UROLOGY | Facility: CLINIC | Age: 52
End: 2022-07-12

## 2022-07-12 VITALS — HEIGHT: 67 IN | BODY MASS INDEX: 40.81 KG/M2 | WEIGHT: 260 LBS

## 2022-07-12 DIAGNOSIS — N30.11 INTERSTITIAL CYSTITIS (CHRONIC) WITH HEMATURIA: ICD-10-CM

## 2022-07-12 DIAGNOSIS — N30.01 ACUTE CYSTITIS WITH HEMATURIA: Primary | ICD-10-CM

## 2022-07-12 DIAGNOSIS — N32.81 DETRUSOR INSTABILITY: ICD-10-CM

## 2022-07-12 DIAGNOSIS — R31.9 HEMATURIA, UNSPECIFIED TYPE: ICD-10-CM

## 2022-07-12 LAB
BILIRUB BLD-MCNC: NEGATIVE MG/DL
CLARITY, POC: ABNORMAL
COLOR UR: YELLOW
EXPIRATION DATE: ABNORMAL
GLUCOSE UR STRIP-MCNC: NEGATIVE MG/DL
KETONES UR QL: NEGATIVE
LEUKOCYTE EST, POC: ABNORMAL
Lab: ABNORMAL
NITRITE UR-MCNC: NEGATIVE MG/ML
PH UR: 5.5 [PH] (ref 5–8)
PROT UR STRIP-MCNC: ABNORMAL MG/DL
RBC # UR STRIP: ABNORMAL /UL
SP GR UR: 1.01 (ref 1–1.03)
UROBILINOGEN UR QL: NORMAL

## 2022-07-12 PROCEDURE — 87086 URINE CULTURE/COLONY COUNT: CPT | Performed by: NURSE PRACTITIONER

## 2022-07-12 PROCEDURE — 99214 OFFICE O/P EST MOD 30 MIN: CPT | Performed by: NURSE PRACTITIONER

## 2022-07-12 RX ORDER — GENTAMICIN SULFATE 80 MG/100ML
80 INJECTION, SOLUTION INTRAVENOUS ONCE
Status: CANCELLED | OUTPATIENT
Start: 2022-07-20 | End: 2022-07-12

## 2022-07-12 RX ORDER — FLUTICASONE PROPIONATE 50 MCG
2 SPRAY, SUSPENSION (ML) NASAL DAILY PRN
COMMUNITY
Start: 2022-06-07

## 2022-07-12 RX ORDER — MELOXICAM 7.5 MG/1
TABLET ORAL
COMMUNITY
Start: 2022-06-07 | End: 2023-01-20

## 2022-07-12 RX ORDER — METFORMIN HYDROCHLORIDE 500 MG/1
500 TABLET, EXTENDED RELEASE ORAL
COMMUNITY
Start: 2022-07-05

## 2022-07-12 NOTE — PROGRESS NOTES
"Chief Complaint  Cystitis and ACUTE CYSTITIS WITH HEMATURIA/DETRUSOR INSTABILITY (6 MONTHS FOLLOW UP IC/DYSURIA/BLADDER PAIN)    Subjective          Briseyda Edouard presents to [facility] for IC/BLADDER PAIN  History of Present Illness    Ms. Briseyda Edouard  is a pleasant 51-year-old patient, who RETURNS to clinic today for  EVALAUATION.  This is her 6 month follow up FOR Acute cystitis with Hematuria, Flank Pain and detrusors instability She is here to discuss Bladder Mullan distention procedure for her IC. Patient reports bothersome bladder pain and discomfort. She is in apparent distress and reports feeling unwell  with bladder discomfort, urine frequency, urgency and intermittent dysuria.She reports feeling menopausal she states, with hot/cold flashes, night sweats and minimal sleep with worsening nocturia 4-5x. She reports straining with urination.     Today, she reports dysuria,with burning with urination, she has urge urine incontinence. Nevertheless, she  Denies any kidney stone related pain,flank pain, she denies back pain, or any CVA tenderness  or gross hematuria. Her Urine dipstick today show 2+ leukocyte estrace, otherwise is completely negative for any infection.  It is negative for gross hematuria.  She has 3+ microscopic hematuria which is chronic for her(states she has always had microscopic hematuria for over 20+ years).     Overall, she reports doing relatively better. She is currently NOT taking any more antibiotics or probiotics. She reports increasing her PO fluid intake to at least 2-3 liters daily, and is avoiding any bladder irritants.    Objective   Vital Signs:   Ht 170.2 cm (67.01\")   Wt 118 kg (260 lb)   BMI 40.71 kg/m²       ROS:   Review of Systems   Constitutional: Positive for activity change. Negative for chills, fatigue and fever.   HENT: Negative for congestion, ear pain, hearing loss, nosebleeds, sneezing, tinnitus and trouble " swallowing.    Eyes: Negative for blurred vision, pain, discharge, redness and itching.   Respiratory: Negative for cough, choking, chest tightness, shortness of breath and wheezing.    Cardiovascular: Negative for chest pain, palpitations and leg swelling.   Gastrointestinal: Positive for abdominal distention, abdominal pain and nausea. Negative for constipation, diarrhea, vomiting and indigestion.   Endocrine: Negative for cold intolerance and heat intolerance.   Genitourinary: Positive for flank pain, frequency, hematuria, pelvic pain and urgency. Negative for difficulty urinating, dyspareunia, dysuria, genital sores, pelvic pressure, urinary incontinence and vaginal discharge.   Musculoskeletal: Positive for back pain.   Skin: Positive for color change.   Allergic/Immunologic: Negative for environmental allergies, food allergies and immunocompromised state.   Neurological: Negative for dizziness, seizures, syncope, light-headedness, numbness, headache and confusion.   Hematological: Bruises/bleeds easily.   Psychiatric/Behavioral: Positive for depressed mood. Negative for behavioral problems, decreased concentration, hallucinations and suicidal ideas. The patient is nervous/anxious.         Physical Exam  Constitutional:       General: She is in acute distress.      Appearance: She is well-developed. She is obese.   HENT:      Head: Normocephalic and atraumatic.   Eyes:      Pupils: Pupils are equal, round, and reactive to light.   Neck:      Thyroid: No thyromegaly.      Trachea: No tracheal deviation.   Cardiovascular:      Rate and Rhythm: Normal rate and regular rhythm.      Heart sounds: No murmur heard.  Pulmonary:      Effort: Pulmonary effort is normal. No respiratory distress.      Breath sounds: Normal breath sounds. No stridor. No wheezing.   Abdominal:      General: Bowel sounds are normal. There is distension.      Palpations: Abdomen is soft.      Tenderness: There is abdominal tenderness. There is  guarding.   Genitourinary:     General: Normal vulva.      Labia:         Right: No tenderness.         Left: No tenderness.       Vagina: Normal. No vaginal discharge.      Comments: Reports bladder pain/detrussor instability  Musculoskeletal:         General: Tenderness present. No deformity. Normal range of motion.      Cervical back: Normal range of motion.   Skin:     General: Skin is warm and dry.      Capillary Refill: Capillary refill takes less than 2 seconds.      Coloration: Skin is pale.      Findings: No erythema or rash.   Neurological:      Mental Status: She is alert and oriented to person, place, and time.      Cranial Nerves: No cranial nerve deficit.      Sensory: No sensory deficit.      Motor: Weakness present.      Coordination: Coordination normal.   Psychiatric:         Behavior: Behavior normal.         Thought Content: Thought content normal.         Judgment: Judgment normal.       Result Review :     CMP    CMP 3/16/22 7/18/22   Glucose 95 93   BUN 18 12   Creatinine 1.17 (A) 1.15 (A)   Sodium 138 140   Potassium 4.4 4.3   Chloride 108 (A) 107   Calcium 9.1 9.2   Albumin 3.85 4.07   Total Bilirubin 0.2 0.3   Alkaline Phosphatase 95 90   AST (SGOT) 19 41 (A)   ALT (SGPT) 12 16   (A) Abnormal value            CBC    CBC 3/16/22 7/18/22   WBC 11.35 (A) 8.19   RBC 4.35 4.48   Hemoglobin 12.9 13.1   Hematocrit 39.7 40.4   MCV 91.3 90.2   MCH 29.7 29.2   MCHC 32.5 32.4   RDW 13.1 12.6   Platelets 331 266   (A) Abnormal value            Renal Profile    Renal Profile 3/16/22 7/18/22   BUN 18 12   Creatinine 1.17 (A) 1.15 (A)   (A) Abnormal value            UA    Urinalysis 1/11/22 3/16/22 3/16/22 7/12/22     1721 1721    Squamous Epithelial Cells, UA   3-6 (A)    Specific Gravity, UA  1.016     Ketones, UA Negative Trace (A)  Negative   Blood, UA  Large (3+) (A)     Leukocytes, UA Negative Small (1+) (A)  Moderate (2+) (A)   Nitrite, UA  Negative     RBC, UA   Too Numerous to Count (A)    WBC,  UA   6-12 (A)    Bacteria, UA   1+ (A)    (A) Abnormal value            Urine Culture    Urine Culture 1/11/22 7/12/22   Urine Culture No growth <25,000 CFU/mL Normal Urogenital Belkis                Assessment and Plan    Problem List Items Addressed This Visit        Genitourinary and Reproductive     Detrusor instability    Relevant Orders    Case Request (Completed)    COVID PRE-OP / PRE-PROCEDURE SCREENING ORDER (NO ISOLATION) - Swab, Nasopharynx (Completed)    CBC (No Diff) (Completed)    Comprehensive Metabolic Panel (Completed)    Urinary tract infection with hematuria - Primary    Acute cystitis without hematuria    Overview     Added automatically from request for surgery [id number]           Hematuria    Overview     Added automatically from request for surgery [id number]           Relevant Orders    POC Urinalysis Dipstick, Automated (Completed)    Case Request (Completed)    COVID PRE-OP / PRE-PROCEDURE SCREENING ORDER (NO ISOLATION) - Swab, Nasopharynx (Completed)    CBC (No Diff) (Completed)    Comprehensive Metabolic Panel (Completed)          Patient reports that she is not currently experiencing any symptoms of urinary incontinence.    Class 3 Severe Obesity (BMI >=40). Obesity-related health conditions include the following: hypertension, dyslipidemias, GERD and lower extremity venous stasis disease. Obesity is improving with lifestyle modifications. BMI is 40.71kg/m2. We discussed portion control and increasing exercise.      RADIOLOGY (CT AND/OR KUB):    CT Abdomen and Pelvis: Results for orders placed during the hospital encounter of 06/13/17    CT Abdomen Pelvis With & Without Contrast    Narrative  CT ABDOMEN AND PELVIS WITH AND WITHOUT CONTRAST-    CLINICAL INDICATION: Gross hematuria; N39.0-Urinary tract infection,  site not specified; R31.0-Gross hematuria.      DOSE: 3182.06 mGy.cm  COMPARISON: 12/28/2016.    Radiation dose reduction techniques were utilized per ALARA protocol.  Automated  exposure control was initiated through either or milog or  DoseRight software packages by  protocol.      PROCEDURE: Axial images were acquired from the lung bases through the  pubic symphysis before and after IV contrast. No oral contrast was  administered.    FINDINGS: Lung bases show no pleural effusions.    There is a moderate sized hiatal hernia.    The liver is homogeneous but slightly decreased in attenuation  compatible with fatty infiltration of the liver.    Spleen is homogeneous.    There is no adrenal enlargement.    Kidneys show no hydronephrosis or hydroureter. No evidence of a solid  renal mass.    Cysts are seen on the ovaries bilaterally. The uterus is heterogeneous.    The bladder is somewhat decompressed.    Impression  Heterogeneity of the uterus.  Bilateral adnexal cysts.  Other findings as above.    This report was finalized on 6/13/2017 9:49 AM by Dr. Hossein Shine MD.     CT Stone Protocol: Results for orders placed during the hospital encounter of 08/16/19    CT Abdomen Pelvis Stone Protocol    Narrative  CT ABDOMEN PELVIS STONE PROTOCOL-    REASON FOR EXAM: Hematuria; R31.29-Other microscopic hematuria    COMPARISON: Today's CT is compared with an earlier CT scan done in  06/2017.    FINDINGS: Spiral scans were obtained through the kidneys, ureters, and  bladder. The kidneys are normal in size and shape. There was no  hydronephrosis. There were no stones in the intrarenal collecting  systems. There were no stones noted along the course of the ureters.  Urinary bladder is smooth in contour.    Impression  No evidence of stone or obstruction was seen involving the  collecting system of either kidney.    846.43 mGy.cm  The radiation dose reduction device was utilized for each scan per the  ALARA (as low as reasonably achievable) protocol.    This report was finalized on 8/16/2019 2:07 PM by Dr. Chapo Rosales II, MD.     KUB: No results found for this or any previous visit.        LABS (3 MONTHS):    Office Visit on 07/12/2022   Component Date Value Ref Range Status   • Color 07/12/2022 Yellow  Yellow, Straw, Dark Yellow, Jennifer Final   • Clarity, UA 07/12/2022 Slightly Cloudy (A) Clear Final   • Specific Gravity  07/12/2022 1.015  1.005 - 1.030 Final   • pH, Urine 07/12/2022 5.5  5.0 - 8.0 Final   • Leukocytes 07/12/2022 Moderate (2+) (A) Negative Final   • Nitrite, UA 07/12/2022 Negative  Negative Final   • Protein, POC 07/12/2022 Trace (A) Negative mg/dL Final   • Glucose, UA 07/12/2022 Negative  Negative mg/dL Final   • Ketones, UA 07/12/2022 Negative  Negative Final   • Urobilinogen, UA 07/12/2022 Normal  Normal Final   • Bilirubin 07/12/2022 Negative  Negative Final   • Blood, UA 07/12/2022 3+ (A) Negative Final   • Lot Number 07/12/2022 [id number]   Final   • Expiration Date 07/12/2022 11/24/2023   Final   • Urine Culture 07/12/2022 <25,000 CFU/mL Normal Urogenital Belkis   Final                    ASSESSMENT  ACUTE CYSTITIS WITH HEMATURIA/DETRUSOR INSTABILITY/FLANK PAIN  Ms. Briseyda Gonzalez  is a pleasant 51-year-old patient, who RETURNS to clinic today for  EVALUATION, with concerns for Acute cystitis with Hematuria, Flank Pain and detrusors instability.Her Urine dipstick today show 2+ leukocyte estrace, otherwise is completely negative for any infection.  It is negative for gross hematuria.  She has 3+ microscopic hematuria which is chronic for her(states she has always had microscopic hematuria for over 20+ years).    Interstitial Cystitis-we discussed the diagnosis and management of this condition.  I indicated that it was a multifactorial condition with multifactorial symptomatology, Including frequency, urgency, the sensation of urinary tract infection in the absence of a positive culture, sexual symptomatology including significant dyspareunia. We discussed treatment options including the importance of making a clinical diagnosis and the cystoscopic findings including  Hunner's ulcers etc.      We also discussed medical management including pharmacologic treatment such as amitriptyline, naturopathic treatments such as pumpkin oil and which more aggressive options of Botox injections as well as the relative risks and merits of this.  We also discussed the use of dietary manipulation including the over-the-counter product relief which basically decreases the acid in the urine and avoidance of ascitic-containing foods such as citrus is etc.      PLAN  We resent her urine for culture. I will call her with results if any bacteria growth      She has been encouraged to  increase her p.o. fluid intake to at least 1 to 2 L daily and avoid bladder irritants such as caffeine products, spicy foods, and citrusy foods.     I recommend concomitant probiotics with treatment with antibiotics to protect the rectal reservoir including over-the-counter yogurt preparations to shannan oral pills containing the appropriate probiotics. Patient reports the diligent use of Probiotics.    She is to also continue other medications for yeast vaginitis, atrophic vaginitis as prescribed above.    We discussed lower tract investigation, THE PATIENT HAS BEEN SCHEDULED FOR CYSTOSCOPY , WITH BOTOX INJECTIONS TO BLADDER ON 07/22/2022 WITH DR. LEVY.    WILL FOLLOW UP IN CLINIC POST PROCEDURE, SHE MAY RETURN SOONER IF NEED BE    PATIEN IS AGREEABLE WITH PLAN OF CARE    Smoking Cessation Counseling:  Current every day smoker. less than 3 minutes spent counseling. Will try to cut down.  I advised patient to quit tobacco use and offered support.  I provided patient with tobacco cessation educational material printed in the patient's After Visit Summary.     Follow Up   Return in about 8 days (around 7/20/2022) for Next scheduled follow up, With Dr. Levy IN OR FOR , Cystoscopy BOTOX INJECTION TO BLADDER-IC.    Patient was given instructions and counseling regarding her condition or for health maintenance advice.  Please see specific information pulled into the AVS if appropriate.          This document has been electronically signed by Griselda Cheng-Akwa, APRN   July 25, 2022 06:51 EDT      Please note that portions of this note were completed with a voice recognition program. Efforts were made to edit dictation, but occasionally words are mistranscribed.

## 2022-07-13 LAB — BACTERIA SPEC AEROBE CULT: NORMAL

## 2022-07-18 ENCOUNTER — LAB (OUTPATIENT)
Dept: LAB | Facility: HOSPITAL | Age: 52
End: 2022-07-18

## 2022-07-18 ENCOUNTER — PRE-ADMISSION TESTING (OUTPATIENT)
Dept: PREADMISSION TESTING | Facility: HOSPITAL | Age: 52
End: 2022-07-18

## 2022-07-18 DIAGNOSIS — R31.9 HEMATURIA, UNSPECIFIED TYPE: ICD-10-CM

## 2022-07-18 DIAGNOSIS — N32.81 DETRUSOR INSTABILITY: ICD-10-CM

## 2022-07-18 DIAGNOSIS — N30.00 ACUTE CYSTITIS WITHOUT HEMATURIA: ICD-10-CM

## 2022-07-18 LAB
ALBUMIN SERPL-MCNC: 4.07 G/DL (ref 3.5–5.2)
ALBUMIN/GLOB SERPL: 1.6 G/DL
ALP SERPL-CCNC: 90 U/L (ref 39–117)
ALT SERPL W P-5'-P-CCNC: 16 U/L (ref 1–33)
ANION GAP SERPL CALCULATED.3IONS-SCNC: 10.4 MMOL/L (ref 5–15)
AST SERPL-CCNC: 41 U/L (ref 1–32)
BILIRUB SERPL-MCNC: 0.3 MG/DL (ref 0–1.2)
BUN SERPL-MCNC: 12 MG/DL (ref 6–20)
BUN/CREAT SERPL: 10.4 (ref 7–25)
CALCIUM SPEC-SCNC: 9.2 MG/DL (ref 8.6–10.5)
CHLORIDE SERPL-SCNC: 107 MMOL/L (ref 98–107)
CO2 SERPL-SCNC: 22.6 MMOL/L (ref 22–29)
CREAT SERPL-MCNC: 1.15 MG/DL (ref 0.57–1)
DEPRECATED RDW RBC AUTO: 41.6 FL (ref 37–54)
EGFRCR SERPLBLD CKD-EPI 2021: 58.5 ML/MIN/1.73
ERYTHROCYTE [DISTWIDTH] IN BLOOD BY AUTOMATED COUNT: 12.6 % (ref 12.3–15.4)
GLOBULIN UR ELPH-MCNC: 2.5 GM/DL
GLUCOSE SERPL-MCNC: 93 MG/DL (ref 65–99)
HCT VFR BLD AUTO: 40.4 % (ref 34–46.6)
HGB BLD-MCNC: 13.1 G/DL (ref 12–15.9)
MCH RBC QN AUTO: 29.2 PG (ref 26.6–33)
MCHC RBC AUTO-ENTMCNC: 32.4 G/DL (ref 31.5–35.7)
MCV RBC AUTO: 90.2 FL (ref 79–97)
PLATELET # BLD AUTO: 266 10*3/MM3 (ref 140–450)
PMV BLD AUTO: 9.6 FL (ref 6–12)
POTASSIUM SERPL-SCNC: 4.3 MMOL/L (ref 3.5–5.2)
PROT SERPL-MCNC: 6.6 G/DL (ref 6–8.5)
QT INTERVAL: 350 MS
QTC INTERVAL: 449 MS
RBC # BLD AUTO: 4.48 10*6/MM3 (ref 3.77–5.28)
SODIUM SERPL-SCNC: 140 MMOL/L (ref 136–145)
WBC NRBC COR # BLD: 8.19 10*3/MM3 (ref 3.4–10.8)

## 2022-07-18 PROCEDURE — U0004 COV-19 TEST NON-CDC HGH THRU: HCPCS

## 2022-07-18 PROCEDURE — 80053 COMPREHEN METABOLIC PANEL: CPT

## 2022-07-18 PROCEDURE — 93005 ELECTROCARDIOGRAM TRACING: CPT

## 2022-07-18 PROCEDURE — 36415 COLL VENOUS BLD VENIPUNCTURE: CPT

## 2022-07-18 PROCEDURE — 93010 ELECTROCARDIOGRAM REPORT: CPT | Performed by: INTERNAL MEDICINE

## 2022-07-18 PROCEDURE — C9803 HOPD COVID-19 SPEC COLLECT: HCPCS

## 2022-07-18 PROCEDURE — 85027 COMPLETE CBC AUTOMATED: CPT

## 2022-07-18 NOTE — DISCHARGE INSTRUCTIONS

## 2022-07-19 LAB — SARS-COV-2 RNA PNL SPEC NAA+PROBE: DETECTED

## 2022-08-21 ENCOUNTER — HOSPITAL ENCOUNTER (EMERGENCY)
Facility: HOSPITAL | Age: 52
Discharge: HOME | End: 2022-08-21
Attending: EMERGENCY MEDICINE | Admitting: EMERGENCY MEDICINE

## 2022-08-21 ENCOUNTER — APPOINTMENT (OUTPATIENT)
Dept: ULTRASOUND IMAGING | Facility: HOSPITAL | Age: 52
End: 2022-08-21

## 2022-08-21 VITALS
BODY MASS INDEX: 43.16 KG/M2 | HEART RATE: 87 BPM | WEIGHT: 275 LBS | OXYGEN SATURATION: 98 % | TEMPERATURE: 97.9 F | HEIGHT: 67 IN | SYSTOLIC BLOOD PRESSURE: 142 MMHG | DIASTOLIC BLOOD PRESSURE: 87 MMHG | RESPIRATION RATE: 17 BRPM

## 2022-08-21 DIAGNOSIS — M79.601 RIGHT ARM PAIN: Primary | ICD-10-CM

## 2022-08-21 LAB
ALBUMIN SERPL-MCNC: 4.08 G/DL (ref 3.5–5.2)
ALBUMIN/GLOB SERPL: 1.5 G/DL
ALP SERPL-CCNC: 83 U/L (ref 39–117)
ALT SERPL W P-5'-P-CCNC: 13 U/L (ref 1–33)
ANION GAP SERPL CALCULATED.3IONS-SCNC: 10 MMOL/L (ref 5–15)
AST SERPL-CCNC: 29 U/L (ref 1–32)
BASOPHILS # BLD AUTO: 0.09 10*3/MM3 (ref 0–0.2)
BASOPHILS NFR BLD AUTO: 1 % (ref 0–1.5)
BILIRUB SERPL-MCNC: 0.3 MG/DL (ref 0–1.2)
BUN SERPL-MCNC: 10 MG/DL (ref 6–20)
BUN/CREAT SERPL: 9.3 (ref 7–25)
CALCIUM SPEC-SCNC: 9.3 MG/DL (ref 8.6–10.5)
CHLORIDE SERPL-SCNC: 111 MMOL/L (ref 98–107)
CO2 SERPL-SCNC: 23 MMOL/L (ref 22–29)
CREAT SERPL-MCNC: 1.08 MG/DL (ref 0.57–1)
CRP SERPL-MCNC: 0.59 MG/DL (ref 0–0.5)
DEPRECATED RDW RBC AUTO: 45.6 FL (ref 37–54)
EGFRCR SERPLBLD CKD-EPI 2021: 63.1 ML/MIN/1.73
EOSINOPHIL # BLD AUTO: 0.41 10*3/MM3 (ref 0–0.4)
EOSINOPHIL NFR BLD AUTO: 4.5 % (ref 0.3–6.2)
ERYTHROCYTE [DISTWIDTH] IN BLOOD BY AUTOMATED COUNT: 13.5 % (ref 12.3–15.4)
ERYTHROCYTE [SEDIMENTATION RATE] IN BLOOD: 12 MM/HR (ref 0–20)
GLOBULIN UR ELPH-MCNC: 2.7 GM/DL
GLUCOSE SERPL-MCNC: 137 MG/DL (ref 65–99)
HCT VFR BLD AUTO: 42 % (ref 34–46.6)
HGB BLD-MCNC: 13.5 G/DL (ref 12–15.9)
HOLD SPECIMEN: NORMAL
HOLD SPECIMEN: NORMAL
IMM GRANULOCYTES # BLD AUTO: 0.08 10*3/MM3 (ref 0–0.05)
IMM GRANULOCYTES NFR BLD AUTO: 0.9 % (ref 0–0.5)
LYMPHOCYTES # BLD AUTO: 3.06 10*3/MM3 (ref 0.7–3.1)
LYMPHOCYTES NFR BLD AUTO: 33.3 % (ref 19.6–45.3)
MCH RBC QN AUTO: 29.4 PG (ref 26.6–33)
MCHC RBC AUTO-ENTMCNC: 32.1 G/DL (ref 31.5–35.7)
MCV RBC AUTO: 91.5 FL (ref 79–97)
MONOCYTES # BLD AUTO: 0.69 10*3/MM3 (ref 0.1–0.9)
MONOCYTES NFR BLD AUTO: 7.5 % (ref 5–12)
NEUTROPHILS NFR BLD AUTO: 4.86 10*3/MM3 (ref 1.7–7)
NEUTROPHILS NFR BLD AUTO: 52.8 % (ref 42.7–76)
NRBC BLD AUTO-RTO: 0 /100 WBC (ref 0–0.2)
PLATELET # BLD AUTO: 310 10*3/MM3 (ref 140–450)
PMV BLD AUTO: 9.5 FL (ref 6–12)
POTASSIUM SERPL-SCNC: 3.8 MMOL/L (ref 3.5–5.2)
PROT SERPL-MCNC: 6.8 G/DL (ref 6–8.5)
RBC # BLD AUTO: 4.59 10*6/MM3 (ref 3.77–5.28)
SODIUM SERPL-SCNC: 144 MMOL/L (ref 136–145)
WBC NRBC COR # BLD: 9.19 10*3/MM3 (ref 3.4–10.8)
WHOLE BLOOD HOLD COAG: NORMAL
WHOLE BLOOD HOLD SPECIMEN: NORMAL

## 2022-08-21 PROCEDURE — 86140 C-REACTIVE PROTEIN: CPT | Performed by: PHYSICIAN ASSISTANT

## 2022-08-21 PROCEDURE — 93971 EXTREMITY STUDY: CPT

## 2022-08-21 PROCEDURE — 80053 COMPREHEN METABOLIC PANEL: CPT | Performed by: PHYSICIAN ASSISTANT

## 2022-08-21 PROCEDURE — 85025 COMPLETE CBC W/AUTO DIFF WBC: CPT | Performed by: PHYSICIAN ASSISTANT

## 2022-08-21 PROCEDURE — 85652 RBC SED RATE AUTOMATED: CPT | Performed by: PHYSICIAN ASSISTANT

## 2022-08-21 PROCEDURE — 99282 EMERGENCY DEPT VISIT SF MDM: CPT

## 2022-08-21 PROCEDURE — 36415 COLL VENOUS BLD VENIPUNCTURE: CPT

## 2022-08-21 NOTE — ED NOTES
MEDICAL SCREENING:    Reason for Visit: Swelling with warmth and pain in right upper arm  Patient initially seen in triage.  The patient was advised further evaluation and diagnostic testing will be needed, some of the treatment and testing will be initiated in the lobby in order to begin the process.  The patient will be returned to the waiting area for the time being and possibly be re-assessed by a subsequent ED provider.  The patient will be brought back to the treatment area in as timely manner as possible.         Chung Covington, PA  08/21/22 1532

## 2022-08-21 NOTE — ED PROVIDER NOTES
Subjective   50 yo female patient presents to the ED with complaints of right upper arm pain radiating into the right axilla. Patient states that this started 4 days ago. She states that there has been redness and warmth. Patient denies any injury, lifting, or pulling. No fevers. No worsening or alleviating factors.       History provided by:  Patient   used: No        Review of Systems   Constitutional: Negative.    HENT: Negative.    Eyes: Negative.    Respiratory: Negative.    Cardiovascular: Negative.    Gastrointestinal: Negative.    Endocrine: Negative.    Genitourinary: Negative.    Musculoskeletal:        R arm pain    Skin: Negative.    Allergic/Immunologic: Negative.    Neurological: Negative.    Hematological: Negative.    Psychiatric/Behavioral: Negative.    All other systems reviewed and are negative.      Past Medical History:   Diagnosis Date   • Anxiety and depression    • Arthritis    • Body piercing     both ears   • Cancer (HCC)     MELANOMA-right breast, back   • Diabetes mellitus (HCC)    • Diverticulitis    • GERD (gastroesophageal reflux disease)    • Hiatal hernia    • History of being tatooed    • Hot flashes    • Hypertension    • Kidney stones    • Menstrual changes    • Pelvic pain    • Pneumonia    • PONV (postoperative nausea and vomiting)    • Urinary incontinence    • Vitamin D deficiency    • Wears glasses     for reading       Allergies   Allergen Reactions   • Penicillins Anaphylaxis       Past Surgical History:   Procedure Laterality Date   • ABDOMINAL SURGERY     • BLADDER SURGERY      sling x 2   •  SECTION      x1   • CHOLECYSTECTOMY     • COLONOSCOPY     • CYSTOSCOPY URETEROSCOPY LASER LITHOTRIPSY Right 2021    Procedure: CYSTOSCOPY URETEROSCOPY RETROGRADE RIGHT, URETEROGRAM;  Surgeon: Steven Wayne MD;  Location: [facility];  Service: Urology;  Laterality: Right;   • ENDOSCOPY     • INTERSTIM REMOVAL N/A 10/23/2019    Procedure:  INTERSTIM REMOVAL;  Surgeon: Jose Adams MD;  Location: [facility];  Service: Urology   • SEPTOPLASTY     • SKIN BIOPSY     • TONSILLECTOMY AND ADENOIDECTOMY     • TUBAL ABDOMINAL LIGATION         Family History   Problem Relation Age of Onset   • Cancer Mother         renal cell carconma   • Diabetes Father    • Hypertension Father        Social History     Socioeconomic History   • Marital status:    Tobacco Use   • Smoking status: Current Every Day Smoker     Packs/day: 0.50     Years: 30.00     Pack years: 15.00     Types: Cigarettes   • Smokeless tobacco: Never Used   Vaping Use   • Vaping Use: Some days   • Substances: Nicotine, Flavoring   • Devices: Disposable   Substance and Sexual Activity   • Alcohol use: No   • Drug use: No   • Sexual activity: Defer           Objective   Physical Exam  Vitals and nursing note reviewed.   Constitutional:       Appearance: Normal appearance. She is normal weight.   HENT:      Head: Normocephalic and atraumatic.      Right Ear: External ear normal.      Left Ear: External ear normal.      Nose: Nose normal.      Mouth/Throat:      Mouth: Mucous membranes are moist.      Pharynx: Oropharynx is clear.   Eyes:      Extraocular Movements: Extraocular movements intact.      Conjunctiva/sclera: Conjunctivae normal.      Pupils: Pupils are equal, round, and reactive to light.   Cardiovascular:      Rate and Rhythm: Normal rate and regular rhythm.      Pulses: Normal pulses.      Heart sounds: Normal heart sounds.   Pulmonary:      Effort: Pulmonary effort is normal.      Breath sounds: Normal breath sounds.   Abdominal:      General: Abdomen is flat. Bowel sounds are normal.      Palpations: Abdomen is soft.   Musculoskeletal:         General: No swelling, tenderness, deformity or signs of injury. Normal range of motion.      Cervical back: Normal range of motion.      Right lower leg: No edema.      Left lower leg: No edema.      Comments: Normal RUE exam.      Skin:     General: Skin is warm and dry.      Capillary Refill: Capillary refill takes less than 2 seconds.   Neurological:      General: No focal deficit present.      Mental Status: She is alert and oriented to person, place, and time. Mental status is at baseline.   Psychiatric:         Mood and Affect: Mood normal.         Behavior: Behavior normal.         Thought Content: Thought content normal.         Judgment: Judgment normal.         Procedures           ED Course  ED Course as of 08/21/22 1723   Sun Aug 21, 2022   1637 US Venous Doppler Upper Extremity Right (duplex)  IMPRESSION:  Negative examination.  No evidence of right upper extremity venous thrombosis.     Signer Name: Cuate Michaud MD   Signed: 8/21/2022 4:24 PM   Workstation Name: [id number]    [facility]          Specimen Collected: 08/21/22 16:24 Last Resulted: 08/21/22 16:24         [ML]   1723 PT instructed to f/u with PCP. Discussed sx and red flags that would warrant return to the ED.  [ML]      ED Course User Index  [ML] Sushma Mittal PA                                           MDM  Number of Diagnoses or Management Options     Amount and/or Complexity of Data Reviewed  Clinical lab tests: reviewed  Tests in the radiology section of CPT®: reviewed    Risk of Complications, Morbidity, and/or Mortality  Presenting problems: low  Diagnostic procedures: low  Management options: low    Patient Progress  Patient progress: improved      Final diagnoses:   Right arm pain       ED Disposition  ED Disposition     ED Disposition   Discharge    Condition   Stable    Comment   --             Celine Chacon PA-C  [address]  [address] [address]  [phone]    Schedule an appointment as soon as possible for a visit in 1 day           Medication List      No changes were made to your prescriptions during this visit.          Sushma Mittal PA  08/21/22 1723

## 2022-08-24 ENCOUNTER — TRANSCRIBE ORDERS (OUTPATIENT)
Dept: ADMINISTRATIVE | Facility: HOSPITAL | Age: 52
End: 2022-08-24

## 2022-08-24 DIAGNOSIS — M79.601 RIGHT ARM PAIN: Primary | ICD-10-CM

## 2022-09-02 ENCOUNTER — TRANSCRIBE ORDERS (OUTPATIENT)
Dept: ADMINISTRATIVE | Facility: HOSPITAL | Age: 52
End: 2022-09-02

## 2022-09-02 DIAGNOSIS — M79.601 PAIN OF RIGHT ARM: Primary | ICD-10-CM

## 2022-09-23 ENCOUNTER — HOSPITAL ENCOUNTER (OUTPATIENT)
Dept: CT IMAGING | Facility: HOSPITAL | Age: 52
Discharge: HOME | End: 2022-09-23
Admitting: PHYSICIAN ASSISTANT

## 2022-09-23 DIAGNOSIS — M79.601 RIGHT ARM PAIN: ICD-10-CM

## 2022-09-23 PROCEDURE — 73200 CT UPPER EXTREMITY W/O DYE: CPT

## 2022-09-23 PROCEDURE — 73200 CT UPPER EXTREMITY W/O DYE: CPT | Performed by: RADIOLOGY

## 2022-10-14 ENCOUNTER — HOSPITAL ENCOUNTER (OUTPATIENT)
Dept: MAMMOGRAPHY | Facility: HOSPITAL | Age: 52
Discharge: HOME | End: 2022-10-14
Admitting: PHYSICIAN ASSISTANT

## 2022-10-14 ENCOUNTER — OFFICE VISIT (OUTPATIENT)
Dept: NEUROSURGERY | Facility: CLINIC | Age: 52
End: 2022-10-14

## 2022-10-14 VITALS
HEIGHT: 67 IN | TEMPERATURE: 97.5 F | BODY MASS INDEX: 42.28 KG/M2 | WEIGHT: 269.4 LBS | DIASTOLIC BLOOD PRESSURE: 80 MMHG | SYSTOLIC BLOOD PRESSURE: 122 MMHG

## 2022-10-14 DIAGNOSIS — M54.50 CHRONIC BILATERAL LOW BACK PAIN WITHOUT SCIATICA: ICD-10-CM

## 2022-10-14 DIAGNOSIS — G89.29 CHRONIC BILATERAL LOW BACK PAIN WITHOUT SCIATICA: ICD-10-CM

## 2022-10-14 DIAGNOSIS — M51.36 DDD (DEGENERATIVE DISC DISEASE), LUMBAR: Primary | ICD-10-CM

## 2022-10-14 DIAGNOSIS — Z12.31 VISIT FOR SCREENING MAMMOGRAM: ICD-10-CM

## 2022-10-14 PROCEDURE — 99204 OFFICE O/P NEW MOD 45 MIN: CPT | Performed by: PHYSICIAN ASSISTANT

## 2022-10-14 PROCEDURE — 77067 SCR MAMMO BI INCL CAD: CPT | Performed by: RADIOLOGY

## 2022-10-14 PROCEDURE — 77063 BREAST TOMOSYNTHESIS BI: CPT

## 2022-10-14 PROCEDURE — 77067 SCR MAMMO BI INCL CAD: CPT

## 2022-10-14 PROCEDURE — 77063 BREAST TOMOSYNTHESIS BI: CPT | Performed by: RADIOLOGY

## 2022-10-14 NOTE — PROGRESS NOTES
Patient: Briseyda Edouard  : 1970  Chart #: [id number]    Date of Service: 10/14/2022    Chief Complaint   Patient presents with   • Back Pain     New patient       HPI  This 53 yo WF presents with severe left sided Lower back pain    Chronic Illnesses:  Chronic low back pain  Past Medical History:   Diagnosis Date   • Anxiety and depression    • Arthritis    • Back pain    • Body piercing     both ears   • Cancer (HCC)     MELANOMA-right breast, back   • Diabetes mellitus (HCC)    • Diverticulitis    • GERD (gastroesophageal reflux disease)    • H/O bladder infections    • Hiatal hernia    • History of being tatooed    • Hot flashes    • Hypertension    • Kidney stones    • Menstrual changes    • Pelvic pain    • Pneumonia    • PONV (postoperative nausea and vomiting)    • Urinary incontinence    • Vitamin D deficiency    • Wears glasses     for reading         Past Surgical History:   Procedure Laterality Date   • ABDOMINAL SURGERY     • BLADDER SURGERY      sling x 2   •  SECTION      x1   • CHOLECYSTECTOMY     • COLONOSCOPY     • CYSTOSCOPY URETEROSCOPY LASER LITHOTRIPSY Right 2021    Procedure: CYSTOSCOPY URETEROSCOPY RETROGRADE RIGHT, URETEROGRAM;  Surgeon: Steven Wayne MD;  Location: [facility];  Service: Urology;  Laterality: Right;   • ENDOSCOPY     • INTERSTIM REMOVAL N/A 10/23/2019    Procedure: INTERSTIM REMOVAL;  Surgeon: Jose Adams MD;  Location: [facility];  Service: Urology   • SEPTOPLASTY     • SKIN BIOPSY     • TONSILLECTOMY AND ADENOIDECTOMY     • TUBAL ABDOMINAL LIGATION         Allergies   Allergen Reactions   • Penicillins Anaphylaxis         Current Outpatient Medications:   •  B Complex Vitamins (VITAMIN B COMPLEX PO), Take 1 capsule by mouth Daily., Disp: , Rfl:   •  diclofenac (VOLTAREN) 75 MG EC tablet, Take 1 tablet by mouth 2 (Two) Times a Day., Disp: 14 tablet, Rfl: 0  •  estrogens, conjugated, (PREMARIN) 0.3 MG tablet, Take 1 tablet by mouth  Daily. Take daily for 21 days then do not take for 7 days., Disp: , Rfl:   •  FLUoxetine (PROzac) 20 MG capsule, Take 10 mg by mouth Daily., Disp: , Rfl:   •  fluticasone (FLONASE) 50 MCG/ACT nasal spray, , Disp: , Rfl:   •  lisinopril (PRINIVIL,ZESTRIL) 10 MG tablet, Take 10 mg by mouth Daily., Disp: , Rfl:   •  meloxicam (MOBIC) 7.5 MG tablet, , Disp: , Rfl:   •  metFORMIN ER (GLUCOPHAGE-XR) 500 MG 24 hr tablet, , Disp: , Rfl:   •  promethazine (PHENERGAN) 25 MG tablet, Take 1 tablet by mouth Every 8 (Eight) Hours As Needed for Nausea or Vomiting., Disp: 20 tablet, Rfl: 0  •  vitamin D (ERGOCALCIFEROL) 23997 units capsule capsule, Take 50,000 Units by mouth Every 7 (Seven) Days., Disp: , Rfl:   •  busPIRone (BUSPAR) 10 MG tablet, Take 1 tablet by mouth 3 (Three) Times a Day., Disp: , Rfl:   •  methylPREDNISolone (MEDROL) 4 MG dose pack, Take as directed on package instructions., Disp: 21 tablet, Rfl: 0  •  orphenadrine (NORFLEX) 100 MG 12 hr tablet, Take 1 tablet by mouth 2 (Two) Times a Day As Needed for Muscle Spasms or Mild Pain ., Disp: 14 tablet, Rfl: 0  •  phenazopyridine (Pyridium) 200 MG tablet, Take 1 tablet by mouth 3 (Three) Times a Day As Needed for Bladder Spasms., Disp: 20 tablet, Rfl: 0    Social History     Socioeconomic History   • Marital status:    Tobacco Use   • Smoking status: Every Day     Packs/day: 0.50     Years: 30.00     Pack years: 15.00     Types: Cigarettes   • Smokeless tobacco: Never   Vaping Use   • Vaping Use: Some days   • Substances: Nicotine, Flavoring   • Devices: Disposable   Substance and Sexual Activity   • Alcohol use: No   • Drug use: No   • Sexual activity: Defer       Family History   Problem Relation Age of Onset   • Diabetes Mother    • Arthritis Mother    • Cancer Mother         renal cell carconma   • Diabetes Father    • Hypertension Father    • Asthma Son    • Breast cancer Neg Hx        Class 3 Severe Obesity (BMI >=40). Obesity-related health conditions  include the following: osteoarthritis. Obesity is unchanged. BMI is is above average; BMI management plan is completed.     Social History    Tobacco Use      Smoking status: Every Day        Packs/day: 0.50        Years: 30.00        Pack years: 15        Types: Cigarettes      Smokeless tobacco: Never       Review of Systems   Constitutional: Negative for activity change, appetite change, chills, diaphoresis, fatigue, fever and unexpected weight change.   HENT: Negative for congestion, dental problem, drooling, ear discharge, ear pain, facial swelling, hearing loss, mouth sores, nosebleeds, postnasal drip, rhinorrhea, sinus pressure, sneezing, sore throat, tinnitus, trouble swallowing and voice change.    Eyes: Negative for photophobia, pain, discharge, redness, itching and visual disturbance.   Respiratory: Negative for apnea, cough, choking, chest tightness, shortness of breath, wheezing and stridor.    Cardiovascular: Negative for chest pain, palpitations and leg swelling.   Gastrointestinal: Negative for abdominal distention, abdominal pain, anal bleeding, blood in stool, constipation, diarrhea, nausea, rectal pain and vomiting.   Endocrine: Negative for cold intolerance, heat intolerance, polydipsia, polyphagia and polyuria.   Genitourinary: Negative for decreased urine volume, difficulty urinating, dysuria, enuresis, flank pain, frequency, genital sores, hematuria and urgency.   Musculoskeletal: Negative for arthralgias, back pain, gait problem, joint swelling, myalgias, neck pain and neck stiffness.   Skin: Negative for color change, pallor, rash and wound.   Allergic/Immunologic: Negative for environmental allergies, food allergies and immunocompromised state.   Neurological: Negative for dizziness, tremors, seizures, syncope, facial asymmetry, speech difficulty, weakness, light-headedness, numbness and headaches.   Hematological: Negative for adenopathy. Does not bruise/bleed easily.  "  Psychiatric/Behavioral: Negative for agitation, behavioral problems, confusion, decreased concentration, dysphoric mood, hallucinations, self-injury, sleep disturbance and suicidal ideas. The patient is not nervous/anxious and is not hyperactive.    All other systems reviewed and are negative.       Gait & Balance Assessment:  Risk assessment for falls. Fall precautions:  such as;   • Using gait aids a cane, walker at the appropriate height at all times for ambulation or if necessary a wheelchair  • Removing all area rugs and coffee tables to create a safe environment at home  • Ensure clean, dry floors  • Wearing supportive footwear and properly fitting clothing  • Ensure bed/chair is appropriate height and patient's feet can touch the floor  • Using a shower transfer bench  • Using walk-in shower and having shower safety bars installed  • Ensure proper lighting, minimize glare  • Have nightlights operational and in use  • Participation in an exercise program for gait training, balance training and strength  • Avoid carrying laundry up and down steps  • Ensure proper compliance and organization of medications to avoid errors   • Avoid use of over the counter sedatives and alcohol consumption  • Ensure easy access to call bell, glasses, TV control, telephone  • Ensure glasses/hearing aids are in use or close by (on top of night table)     Physical examination:  Blood pressure 122/80, temperature 97.5 °F (36.4 °C), temperature source Infrared, height 170.2 cm (67\"), weight 122 kg (269 lb 6.4 oz), not currently breastfeeding.  HEENT- normocephalic, atraumatic, sclera clear  Lungs-normal expansion, no wheezing  Heart-regular rate and rhythm  Extremities-positive pulses, no edema    Neurologic Exam  [id number]  A/A/C, speech clear, attention normal, conversant, answers questions appropriately, good historian.  Cranial nerves II through XII are intact.  Motor examination does not reveal weakness in the , upper or " lower extremities.   Sensation is intact.  Gait is normal, balance is normal.   No tremors are noted.  Reflexes are decreased in the LE's.   Clark is negative. Clonus is negative.   Palpation does not reveal malalignment.    Radiographic Imaging:  For my review is a lumbar CT reveals L4-5 and L5-S1 degenerative disc.    Medical Decision Making  Diagnoses and all orders for this visit:    1. DDD (degenerative disc disease), lumbar (Primary)  -     MRI Lumbar Spine Without Contrast; Future  -     XR Spine Lumbar AP & Lateral With Flex & Ext; Future    2. Chronic bilateral low back pain without sciatica       Telemed visit after studies.       Maria Esther Donaldson, PAC    Patient Care Team:  Celine Chacon PA-C as PCP - General (Physician Assistant)  Kojo Watson PA-C as Referring Physician (Physician Assistant)  January Donaldson PA-C as Consulting Physician (Physician Assistant)

## 2022-10-14 NOTE — PROGRESS NOTES
Briseyda Edouard   1970   [id number]       10/14/2022     Chief Complaint   Patient presents with   • Back Pain     New patient        HPI   ***  Chronic Illnesses:  ***  Past Medical History:  No date: Anxiety and depression  No date: Arthritis  No date: Back pain  No date: Body piercing      Comment:  both ears  No date: Cancer (HCC)      Comment:  MELANOMA-right breast, back  No date: Diabetes mellitus (HCC)  No date: Diverticulitis  No date: GERD (gastroesophageal reflux disease)  No date: H/O bladder infections  No date: Hiatal hernia  No date: History of being tatooed  No date: Hot flashes  No date: Hypertension  No date: Kidney stones  No date: Menstrual changes  No date: Pelvic pain  No date: Pneumonia  No date: PONV (postoperative nausea and vomiting)  No date: Urinary incontinence  No date: Vitamin D deficiency  No date: Wears glasses      Comment:  for reading     Past Surgical History:   Procedure Laterality Date   • ABDOMINAL SURGERY     • BLADDER SURGERY      sling x 2   •  SECTION      x1   • CHOLECYSTECTOMY     • COLONOSCOPY     • CYSTOSCOPY URETEROSCOPY LASER LITHOTRIPSY Right 2021    Procedure: CYSTOSCOPY URETEROSCOPY RETROGRADE RIGHT, URETEROGRAM;  Surgeon: Steven Wayne MD;  Location: [facility];  Service: Urology;  Laterality: Right;   • ENDOSCOPY     • INTERSTIM REMOVAL N/A 10/23/2019    Procedure: INTERSTIM REMOVAL;  Surgeon: Jose Adams MD;  Location: [facility];  Service: Urology   • SEPTOPLASTY     • SKIN BIOPSY     • TONSILLECTOMY AND ADENOIDECTOMY     • TUBAL ABDOMINAL LIGATION          Allergies   Allergen Reactions   • Penicillins Anaphylaxis          Current Outpatient Medications:   •  B Complex Vitamins (VITAMIN B COMPLEX PO), Take 1 capsule by mouth Daily., Disp: , Rfl:   •  diclofenac (VOLTAREN) 75 MG EC tablet, Take 1 tablet by mouth 2 (Two) Times a Day., Disp: 14 tablet, Rfl: 0  •  estrogens, conjugated, (PREMARIN) 0.3 MG tablet, Take 1 tablet by  mouth Daily. Take daily for 21 days then do not take for 7 days., Disp: , Rfl:   •  FLUoxetine (PROzac) 20 MG capsule, Take 10 mg by mouth Daily., Disp: , Rfl:   •  fluticasone (FLONASE) 50 MCG/ACT nasal spray, , Disp: , Rfl:   •  lisinopril (PRINIVIL,ZESTRIL) 10 MG tablet, Take 10 mg by mouth Daily., Disp: , Rfl:   •  meloxicam (MOBIC) 7.5 MG tablet, , Disp: , Rfl:   •  metFORMIN ER (GLUCOPHAGE-XR) 500 MG 24 hr tablet, , Disp: , Rfl:   •  promethazine (PHENERGAN) 25 MG tablet, Take 1 tablet by mouth Every 8 (Eight) Hours As Needed for Nausea or Vomiting., Disp: 20 tablet, Rfl: 0  •  vitamin D (ERGOCALCIFEROL) 39728 units capsule capsule, Take 50,000 Units by mouth Every 7 (Seven) Days., Disp: , Rfl:   •  busPIRone (BUSPAR) 10 MG tablet, Take 1 tablet by mouth 3 (Three) Times a Day., Disp: , Rfl:   •  methylPREDNISolone (MEDROL) 4 MG dose pack, Take as directed on package instructions., Disp: 21 tablet, Rfl: 0  •  orphenadrine (NORFLEX) 100 MG 12 hr tablet, Take 1 tablet by mouth 2 (Two) Times a Day As Needed for Muscle Spasms or Mild Pain ., Disp: 14 tablet, Rfl: 0  •  phenazopyridine (Pyridium) 200 MG tablet, Take 1 tablet by mouth 3 (Three) Times a Day As Needed for Bladder Spasms., Disp: 20 tablet, Rfl: 0     Social History     Socioeconomic History   • Marital status:    Tobacco Use   • Smoking status: Every Day     Packs/day: 0.50     Years: 30.00     Pack years: 15.00     Types: Cigarettes   • Smokeless tobacco: Never   Vaping Use   • Vaping Use: Some days   • Substances: Nicotine, Flavoring   • Devices: Disposable   Substance and Sexual Activity   • Alcohol use: No   • Drug use: No   • Sexual activity: Defer        family history includes Arthritis in her mother; Asthma in her son; Cancer in her mother; Diabetes in her father and mother; Hypertension in her father.     Social History    Tobacco Use      Smoking status: Every Day        Packs/day: 0.50        Years: 30.00        Pack years: 15         "Types: Cigarettes      Smokeless tobacco: Never       Gait & Balance Assessment :  Risk assessment for falls. Fall precautions.  universal fall precautions, such as;   • Using gait aids a cane, walker at the appropriate height at all times for ambulation or if necessary a wheelchair  • Removing all area rugs and coffee tables to create a safe environment at home  • Ensure clean, dry floors  • Wearing supportive footwear and properly fitting clothing  • Ensure bed/chair is appropriate height and patient's feet can touch the floor  • Using a shower transfer bench  • Using walk-in shower and having shower safety bars installed  • Ensure proper lighting, minimize glare  • Have nightlights operational and in use  • Participation in an exercise program for gait training, balance training and strength  • Avoid carrying laundry up and down steps  • Ensure proper compliance and organization of medications to avoid errors   • Avoid use of over the counter sedatives and alcohol consumption  • Ensure easy access to call bell, glasses, TV control, telephone  • Ensure glasses/hearing aids are in use or close by (on top of night table)     Body mass index is 42.19 kg/m².   {Class 3 Severe Obesity (BMI >=40).:3054247389}       /80 (BP Location: Right arm, Patient Position: Sitting, Cuff Size: Adult)   Temp 97.5 °F (36.4 °C) (Infrared)   Ht 170.2 cm (67\")   Wt 122 kg (269 lb 6.4 oz)   BMI 42.19 kg/m²    Physical Examination:  HEENT-wnl  Lungs-No wheezing or SOB      Neurologic Exam   Patient is alert and oriented.  Pupils are equal and reactive.  Visual fields are full.  EOMI without nystagmus.    Gait***  Reflexes intact.  No focal weakness***  SLR***    Radiological Data Review:  For my review today***        Assessment and Plan:  There are no diagnoses linked to this encounter.       January Donaldson, MARK      PCP:  Celine Chacon PA-C   "

## 2022-10-17 PROBLEM — G89.29 CHRONIC BILATERAL LOW BACK PAIN WITHOUT SCIATICA: Status: ACTIVE | Noted: 2022-10-17

## 2022-10-17 PROBLEM — M54.50 CHRONIC BILATERAL LOW BACK PAIN WITHOUT SCIATICA: Status: ACTIVE | Noted: 2022-10-17

## 2022-10-17 PROBLEM — M51.369 DDD (DEGENERATIVE DISC DISEASE), LUMBAR: Status: ACTIVE | Noted: 2022-10-17

## 2022-10-17 PROBLEM — M51.36 DDD (DEGENERATIVE DISC DISEASE), LUMBAR: Status: ACTIVE | Noted: 2022-10-17

## 2022-11-01 ENCOUNTER — HOSPITAL ENCOUNTER (OUTPATIENT)
Dept: ULTRASOUND IMAGING | Facility: HOSPITAL | Age: 52
Discharge: HOME | End: 2022-11-01
Admitting: RADIOLOGY

## 2022-11-01 DIAGNOSIS — R92.8 ABNORMAL MAMMOGRAM OF LEFT BREAST: ICD-10-CM

## 2022-11-01 PROCEDURE — 76882 US LMTD JT/FCL EVL NVASC XTR: CPT | Performed by: RADIOLOGY

## 2022-11-01 PROCEDURE — 76882 US LMTD JT/FCL EVL NVASC XTR: CPT

## 2022-11-08 ENCOUNTER — APPOINTMENT (OUTPATIENT)
Dept: MRI IMAGING | Facility: HOSPITAL | Age: 52
End: 2022-11-08

## 2022-11-08 DIAGNOSIS — M54.50 CHRONIC BILATERAL LOW BACK PAIN WITHOUT SCIATICA: ICD-10-CM

## 2022-11-08 DIAGNOSIS — G89.29 CHRONIC BILATERAL LOW BACK PAIN WITHOUT SCIATICA: ICD-10-CM

## 2022-11-08 DIAGNOSIS — M51.36 DDD (DEGENERATIVE DISC DISEASE), LUMBAR: Primary | ICD-10-CM

## 2022-11-28 ENCOUNTER — APPOINTMENT (OUTPATIENT)
Dept: MRI IMAGING | Facility: HOSPITAL | Age: 52
End: 2022-11-28

## 2023-01-19 ENCOUNTER — APPOINTMENT (OUTPATIENT)
Dept: GENERAL RADIOLOGY | Facility: HOSPITAL | Age: 53
End: 2023-01-19
Payer: COMMERCIAL

## 2023-01-19 ENCOUNTER — HOSPITAL ENCOUNTER (EMERGENCY)
Facility: HOSPITAL | Age: 53
Discharge: HOME | End: 2023-01-20
Attending: EMERGENCY MEDICINE | Admitting: EMERGENCY MEDICINE
Payer: COMMERCIAL

## 2023-01-19 DIAGNOSIS — R07.9 CHEST PAIN, UNSPECIFIED TYPE: Primary | ICD-10-CM

## 2023-01-19 LAB
ALBUMIN SERPL-MCNC: 4 G/DL (ref 3.5–5.2)
ALBUMIN/GLOB SERPL: 1.4 G/DL
ALP SERPL-CCNC: 90 U/L (ref 39–117)
ALT SERPL W P-5'-P-CCNC: 19 U/L (ref 1–33)
ANION GAP SERPL CALCULATED.3IONS-SCNC: 9.6 MMOL/L (ref 5–15)
AST SERPL-CCNC: 45 U/L (ref 1–32)
BASOPHILS # BLD AUTO: 0.08 10*3/MM3 (ref 0–0.2)
BASOPHILS NFR BLD AUTO: 0.8 % (ref 0–1.5)
BILIRUB SERPL-MCNC: 0.2 MG/DL (ref 0–1.2)
BUN SERPL-MCNC: 13 MG/DL (ref 6–20)
BUN/CREAT SERPL: 13.1 (ref 7–25)
CALCIUM SPEC-SCNC: 9.2 MG/DL (ref 8.6–10.5)
CHLORIDE SERPL-SCNC: 105 MMOL/L (ref 98–107)
CO2 SERPL-SCNC: 23.4 MMOL/L (ref 22–29)
CREAT SERPL-MCNC: 0.99 MG/DL (ref 0.57–1)
DEPRECATED RDW RBC AUTO: 43.1 FL (ref 37–54)
EGFRCR SERPLBLD CKD-EPI 2021: 69.6 ML/MIN/1.73
EOSINOPHIL # BLD AUTO: 0.45 10*3/MM3 (ref 0–0.4)
EOSINOPHIL NFR BLD AUTO: 4.7 % (ref 0.3–6.2)
ERYTHROCYTE [DISTWIDTH] IN BLOOD BY AUTOMATED COUNT: 13 % (ref 12.3–15.4)
GLOBULIN UR ELPH-MCNC: 2.9 GM/DL
GLUCOSE SERPL-MCNC: 100 MG/DL (ref 65–99)
HCT VFR BLD AUTO: 40.6 % (ref 34–46.6)
HGB BLD-MCNC: 13.4 G/DL (ref 12–15.9)
HOLD SPECIMEN: NORMAL
HOLD SPECIMEN: NORMAL
IMM GRANULOCYTES # BLD AUTO: 0.06 10*3/MM3 (ref 0–0.05)
IMM GRANULOCYTES NFR BLD AUTO: 0.6 % (ref 0–0.5)
LYMPHOCYTES # BLD AUTO: 3.35 10*3/MM3 (ref 0.7–3.1)
LYMPHOCYTES NFR BLD AUTO: 34.8 % (ref 19.6–45.3)
MCH RBC QN AUTO: 30.1 PG (ref 26.6–33)
MCHC RBC AUTO-ENTMCNC: 33 G/DL (ref 31.5–35.7)
MCV RBC AUTO: 91.2 FL (ref 79–97)
MONOCYTES # BLD AUTO: 0.75 10*3/MM3 (ref 0.1–0.9)
MONOCYTES NFR BLD AUTO: 7.8 % (ref 5–12)
NEUTROPHILS NFR BLD AUTO: 4.94 10*3/MM3 (ref 1.7–7)
NEUTROPHILS NFR BLD AUTO: 51.3 % (ref 42.7–76)
NRBC BLD AUTO-RTO: 0 /100 WBC (ref 0–0.2)
NT-PROBNP SERPL-MCNC: 91.5 PG/ML (ref 0–900)
PLATELET # BLD AUTO: 295 10*3/MM3 (ref 140–450)
PMV BLD AUTO: 9.8 FL (ref 6–12)
POTASSIUM SERPL-SCNC: 4.2 MMOL/L (ref 3.5–5.2)
PROT SERPL-MCNC: 6.9 G/DL (ref 6–8.5)
RBC # BLD AUTO: 4.45 10*6/MM3 (ref 3.77–5.28)
SODIUM SERPL-SCNC: 138 MMOL/L (ref 136–145)
TROPONIN T SERPL-MCNC: <0.01 NG/ML (ref 0–0.03)
TROPONIN T SERPL-MCNC: <0.01 NG/ML (ref 0–0.03)
WBC NRBC COR # BLD: 9.63 10*3/MM3 (ref 3.4–10.8)
WHOLE BLOOD HOLD COAG: NORMAL
WHOLE BLOOD HOLD SPECIMEN: NORMAL

## 2023-01-19 PROCEDURE — 99284 EMERGENCY DEPT VISIT MOD MDM: CPT

## 2023-01-19 PROCEDURE — 71045 X-RAY EXAM CHEST 1 VIEW: CPT

## 2023-01-19 PROCEDURE — 84484 ASSAY OF TROPONIN QUANT: CPT | Performed by: PHYSICIAN ASSISTANT

## 2023-01-19 PROCEDURE — 83880 ASSAY OF NATRIURETIC PEPTIDE: CPT | Performed by: STUDENT IN AN ORGANIZED HEALTH CARE EDUCATION/TRAINING PROGRAM

## 2023-01-19 PROCEDURE — 84439 ASSAY OF FREE THYROXINE: CPT | Performed by: NURSE PRACTITIONER

## 2023-01-19 PROCEDURE — 63710000001 ONDANSETRON ODT 4 MG TABLET DISPERSIBLE: Performed by: STUDENT IN AN ORGANIZED HEALTH CARE EDUCATION/TRAINING PROGRAM

## 2023-01-19 PROCEDURE — 93005 ELECTROCARDIOGRAM TRACING: CPT | Performed by: STUDENT IN AN ORGANIZED HEALTH CARE EDUCATION/TRAINING PROGRAM

## 2023-01-19 PROCEDURE — 80050 GENERAL HEALTH PANEL: CPT | Performed by: PHYSICIAN ASSISTANT

## 2023-01-19 PROCEDURE — 93010 ELECTROCARDIOGRAM REPORT: CPT | Performed by: INTERNAL MEDICINE

## 2023-01-19 PROCEDURE — 83036 HEMOGLOBIN GLYCOSYLATED A1C: CPT | Performed by: NURSE PRACTITIONER

## 2023-01-19 PROCEDURE — 36415 COLL VENOUS BLD VENIPUNCTURE: CPT

## 2023-01-19 RX ORDER — SODIUM CHLORIDE 0.9 % (FLUSH) 0.9 %
10 SYRINGE (ML) INJECTION AS NEEDED
Status: DISCONTINUED | OUTPATIENT
Start: 2023-01-19 | End: 2023-01-20 | Stop reason: HOSPADM

## 2023-01-19 RX ORDER — ASPIRIN 81 MG/1
324 TABLET, CHEWABLE ORAL ONCE
Status: COMPLETED | OUTPATIENT
Start: 2023-01-19 | End: 2023-01-19

## 2023-01-19 RX ORDER — ONDANSETRON 4 MG/1
4 TABLET, ORALLY DISINTEGRATING ORAL ONCE
Status: COMPLETED | OUTPATIENT
Start: 2023-01-19 | End: 2023-01-19

## 2023-01-19 RX ADMIN — ASPIRIN 324 MG: 81 TABLET, CHEWABLE ORAL at 19:07

## 2023-01-19 RX ADMIN — ONDANSETRON 4 MG: 4 TABLET, ORALLY DISINTEGRATING ORAL at 22:01

## 2023-01-19 NOTE — ED NOTES
MEDICAL SCREENING:    Reason for Visit: Intermittent chest burning that lasts few minutes since this morning.  No relieving or exacerbating factors.    Patient initially seen in triage.  The patient was advised further evaluation and diagnostic testing will be needed, some of the treatment and testing will be initiated in the lobby in order to begin the process.  The patient will be returned to the waiting area for the time being and possibly be re-assessed by a subsequent ED provider.  The patient will be brought back to the treatment area in as timely manner as possible.         Chung Covington PA  01/19/23 1721

## 2023-01-20 ENCOUNTER — APPOINTMENT (OUTPATIENT)
Dept: NUCLEAR MEDICINE | Facility: HOSPITAL | Age: 53
End: 2023-01-20
Payer: COMMERCIAL

## 2023-01-20 ENCOUNTER — APPOINTMENT (OUTPATIENT)
Dept: CARDIOLOGY | Facility: HOSPITAL | Age: 53
End: 2023-01-20
Payer: COMMERCIAL

## 2023-01-20 VITALS
HEART RATE: 73 BPM | HEIGHT: 67 IN | DIASTOLIC BLOOD PRESSURE: 84 MMHG | TEMPERATURE: 98.4 F | OXYGEN SATURATION: 93 % | BODY MASS INDEX: 43.16 KG/M2 | WEIGHT: 275 LBS | RESPIRATION RATE: 16 BRPM | SYSTOLIC BLOOD PRESSURE: 122 MMHG

## 2023-01-20 LAB
BH CV NUCLEAR PRIOR STUDY: 3
BH CV REST NUCLEAR ISOTOPE DOSE: 10.7 MCI
BH CV STRESS BP STAGE 1: NORMAL
BH CV STRESS BP STAGE 2: NORMAL
BH CV STRESS DURATION MIN STAGE 1: 3
BH CV STRESS DURATION MIN STAGE 2: 1
BH CV STRESS DURATION SEC STAGE 1: 0
BH CV STRESS DURATION SEC STAGE 2: 5
BH CV STRESS GRADE STAGE 1: 10
BH CV STRESS GRADE STAGE 2: 12
BH CV STRESS HR STAGE 1: 133
BH CV STRESS HR STAGE 2: 153
BH CV STRESS METS STAGE 1: 5
BH CV STRESS METS STAGE 2: 7.5
BH CV STRESS NUCLEAR ISOTOPE DOSE: 31.3 MCI
BH CV STRESS PROTOCOL 1: NORMAL
BH CV STRESS RECOVERY BP: NORMAL MMHG
BH CV STRESS RECOVERY HR: 97 BPM
BH CV STRESS SPEED STAGE 1: 1.7
BH CV STRESS SPEED STAGE 2: 2.5
BH CV STRESS STAGE 1: 1
BH CV STRESS STAGE 2: 2
FLUAV RNA RESP QL NAA+PROBE: NOT DETECTED
FLUBV RNA RESP QL NAA+PROBE: NOT DETECTED
HBA1C MFR BLD: 5.5 % (ref 4.8–5.6)
LV EF NUC BP: 63 %
MAXIMAL PREDICTED HEART RATE: 170 BPM
PERCENT MAX PREDICTED HR: 90 %
QT INTERVAL: 390 MS
QTC INTERVAL: 449 MS
SARS-COV-2 RNA RESP QL NAA+PROBE: NOT DETECTED
STRESS BASELINE BP: NORMAL MMHG
STRESS BASELINE HR: 71 BPM
STRESS PERCENT HR: 106 %
STRESS POST ESTIMATED WORKLOAD: 7.2 METS
STRESS POST EXERCISE DUR MIN: 4 MIN
STRESS POST EXERCISE DUR SEC: 5 SEC
STRESS POST PEAK BP: NORMAL MMHG
STRESS POST PEAK HR: 153 BPM
STRESS TARGET HR: 145 BPM
T4 FREE SERPL-MCNC: 0.95 NG/DL (ref 0.93–1.7)
TSH SERPL DL<=0.05 MIU/L-ACNC: 3.84 UIU/ML (ref 0.27–4.2)

## 2023-01-20 PROCEDURE — 78452 HT MUSCLE IMAGE SPECT MULT: CPT

## 2023-01-20 PROCEDURE — 87636 SARSCOV2 & INF A&B AMP PRB: CPT | Performed by: NURSE PRACTITIONER

## 2023-01-20 PROCEDURE — A9500 TC99M SESTAMIBI: HCPCS | Performed by: EMERGENCY MEDICINE

## 2023-01-20 PROCEDURE — 93018 CV STRESS TEST I&R ONLY: CPT | Performed by: INTERNAL MEDICINE

## 2023-01-20 PROCEDURE — 78452 HT MUSCLE IMAGE SPECT MULT: CPT | Performed by: INTERNAL MEDICINE

## 2023-01-20 PROCEDURE — 93017 CV STRESS TEST TRACING ONLY: CPT

## 2023-01-20 PROCEDURE — 0 TECHNETIUM SESTAMIBI: Performed by: EMERGENCY MEDICINE

## 2023-01-20 RX ORDER — MELOXICAM 15 MG/1
15 TABLET ORAL
COMMUNITY

## 2023-01-20 RX ORDER — FLUTICASONE PROPIONATE 50 MCG
2 SPRAY, SUSPENSION (ML) NASAL DAILY PRN
Status: CANCELLED | OUTPATIENT
Start: 2023-01-20

## 2023-01-20 RX ORDER — BUSPIRONE HYDROCHLORIDE 10 MG/1
10 TABLET ORAL NIGHTLY
Status: CANCELLED | OUTPATIENT
Start: 2023-01-20

## 2023-01-20 RX ORDER — ASCORBIC ACID 500 MG
500 TABLET ORAL
COMMUNITY

## 2023-01-20 RX ORDER — CHOLECALCIFEROL (VITAMIN D3) 125 MCG
500 CAPSULE ORAL DAILY
COMMUNITY

## 2023-01-20 RX ORDER — VITAMIN E 268 MG
400 CAPSULE ORAL NIGHTLY
Status: CANCELLED | OUTPATIENT
Start: 2023-01-20

## 2023-01-20 RX ORDER — VITAMIN E 268 MG
400 CAPSULE ORAL
COMMUNITY

## 2023-01-20 RX ORDER — ASCORBIC ACID 500 MG
500 TABLET ORAL NIGHTLY
Status: CANCELLED | OUTPATIENT
Start: 2023-01-20

## 2023-01-20 RX ORDER — MELOXICAM 7.5 MG/1
15 TABLET ORAL NIGHTLY
Status: CANCELLED | OUTPATIENT
Start: 2023-01-20

## 2023-01-20 RX ORDER — LISINOPRIL 10 MG/1
10 TABLET ORAL DAILY
Status: CANCELLED | OUTPATIENT
Start: 2023-01-20

## 2023-01-20 RX ORDER — LANOLIN ALCOHOL/MO/W.PET/CERES
500 CREAM (GRAM) TOPICAL DAILY
Status: CANCELLED | OUTPATIENT
Start: 2023-01-20

## 2023-01-20 RX ORDER — FLUOXETINE HYDROCHLORIDE 20 MG/1
60 CAPSULE ORAL DAILY
Status: CANCELLED | OUTPATIENT
Start: 2023-01-20

## 2023-01-20 RX ORDER — NITROGLYCERIN 0.4 MG/1
0.4 TABLET SUBLINGUAL
Status: DISCONTINUED | OUTPATIENT
Start: 2023-01-20 | End: 2023-01-20 | Stop reason: HOSPADM

## 2023-01-20 RX ADMIN — TECHNETIUM TC 99M SESTAMIBI 1 DOSE: 1 INJECTION INTRAVENOUS at 08:00

## 2023-01-20 RX ADMIN — TECHNETIUM TC 99M SESTAMIBI 1 DOSE: 1 INJECTION INTRAVENOUS at 09:21

## 2023-01-20 NOTE — ED PROVIDER NOTES
Subjective   History of Present Illness  Patient is a 52-year-old female with past medical history significant for hypertension, diabetes mellitus, and anxiety/depression.  She presents to the ED today with complaints of chest pain that is midsternal that started this morning.  She states that it has been persistent throughout the day.  She describes the pain as sharp.  She states that her pain is now a 2 out of 10.  She reports some mild shortness of breath.  She states that she has never had chest pain before in the past.  Patient reports that she has never had a stress test or 2D echo.  She reports a positive family history with father having heart attack.  She denies any other significant complaints.        Review of Systems   Constitutional: Negative.  Negative for fever.   HENT: Negative.    Respiratory: Positive for shortness of breath.    Cardiovascular: Positive for chest pain. Negative for palpitations and leg swelling.   Gastrointestinal: Negative.  Negative for abdominal pain.   Endocrine: Negative.    Genitourinary: Negative.  Negative for dysuria.   Musculoskeletal: Negative.    Skin: Negative.    Allergic/Immunologic: Negative.    Neurological: Negative.    Hematological: Negative.    Psychiatric/Behavioral: Negative.    All other systems reviewed and are negative.      Past Medical History:   Diagnosis Date   • Anxiety and depression    • Arthritis    • Back pain    • Body piercing     both ears   • Cancer (HCC)     MELANOMA-right breast, back   • Diabetes mellitus (HCC)    • Diverticulitis    • GERD (gastroesophageal reflux disease)    • H/O bladder infections    • Hiatal hernia    • History of being tatooed    • Hot flashes    • Hypertension    • Kidney stones    • Menstrual changes    • Pelvic pain    • Pneumonia    • PONV (postoperative nausea and vomiting)    • Urinary incontinence    • Vitamin D deficiency    • Wears glasses     for reading       Allergies   Allergen Reactions   • Penicillins  Anaphylaxis       Past Surgical History:   Procedure Laterality Date   • ABDOMINAL SURGERY     • BLADDER SURGERY      sling x 2   •  SECTION      x1   • CHOLECYSTECTOMY     • COLONOSCOPY     • CYSTOSCOPY URETEROSCOPY LASER LITHOTRIPSY Right 2021    Procedure: CYSTOSCOPY URETEROSCOPY RETROGRADE RIGHT, URETEROGRAM;  Surgeon: Steven Wayne MD;  Location: [facility] OR;  Service: Urology;  Laterality: Right;   • ENDOSCOPY     • INTERSTIM REMOVAL N/A 10/23/2019    Procedure: INTERSTIM REMOVAL;  Surgeon: Jose Adams MD;  Location: [facility] OR;  Service: Urology   • SEPTOPLASTY     • SKIN BIOPSY     • TONSILLECTOMY AND ADENOIDECTOMY     • TUBAL ABDOMINAL LIGATION         Family History   Problem Relation Age of Onset   • Diabetes Mother    • Arthritis Mother    • Cancer Mother         renal cell carconma   • Diabetes Father    • Hypertension Father    • Asthma Son    • Breast cancer Neg Hx        Social History     Socioeconomic History   • Marital status:    Tobacco Use   • Smoking status: Every Day     Packs/day: 0.50     Years: 30.00     Pack years: 15.00     Types: Cigarettes   • Smokeless tobacco: Never   Vaping Use   • Vaping Use: Some days   • Substances: Nicotine, Flavoring   • Devices: Disposable   Substance and Sexual Activity   • Alcohol use: No   • Drug use: No   • Sexual activity: Defer           Objective   Physical Exam  Vitals and nursing note reviewed.   Constitutional:       General: She is not in acute distress.     Appearance: She is well-developed. She is obese. She is not diaphoretic.   HENT:      Head: Normocephalic and atraumatic.      Right Ear: External ear normal.      Left Ear: External ear normal.      Nose: Nose normal.   Eyes:      Conjunctiva/sclera: Conjunctivae normal.      Pupils: Pupils are equal, round, and reactive to light.   Neck:      Vascular: No JVD.      Trachea: No tracheal deviation.   Cardiovascular:      Rate and Rhythm: Normal rate and  regular rhythm.      Heart sounds: Normal heart sounds. No murmur heard.  Pulmonary:      Effort: Pulmonary effort is normal. No respiratory distress.      Breath sounds: Normal breath sounds. No wheezing.   Abdominal:      General: Bowel sounds are normal.      Palpations: Abdomen is soft.      Tenderness: There is no abdominal tenderness.   Musculoskeletal:         General: No deformity. Normal range of motion.      Cervical back: Normal range of motion and neck supple.   Skin:     General: Skin is warm and dry.      Capillary Refill: Capillary refill takes less than 2 seconds.      Coloration: Skin is not pale.      Findings: No erythema or rash.   Neurological:      General: No focal deficit present.      Mental Status: She is alert and oriented to person, place, and time.      Cranial Nerves: No cranial nerve deficit.   Psychiatric:         Mood and Affect: Mood normal.         Behavior: Behavior normal.         Thought Content: Thought content normal.         Procedures       Results for orders placed or performed during the hospital encounter of 01/19/23   COVID-19 and FLU A/B PCR - Swab, Nasopharynx    Specimen: Nasopharynx; Swab   Result Value Ref Range    COVID19 Not Detected Not Detected - Ref. Range    Influenza A PCR Not Detected Not Detected    Influenza B PCR Not Detected Not Detected   Comprehensive Metabolic Panel    Specimen: Arm, Left; Blood   Result Value Ref Range    Glucose 100 (H) 65 - 99 mg/dL    BUN 13 6 - 20 mg/dL    Creatinine 0.99 0.57 - 1.00 mg/dL    Sodium 138 136 - 145 mmol/L    Potassium 4.2 3.5 - 5.2 mmol/L    Chloride 105 98 - 107 mmol/L    CO2 23.4 22.0 - 29.0 mmol/L    Calcium 9.2 8.6 - 10.5 mg/dL    Total Protein 6.9 6.0 - 8.5 g/dL    Albumin 4.0 3.5 - 5.2 g/dL    ALT (SGPT) 19 1 - 33 U/L    AST (SGOT) 45 (H) 1 - 32 U/L    Alkaline Phosphatase 90 39 - 117 U/L    Total Bilirubin 0.2 0.0 - 1.2 mg/dL    Globulin 2.9 gm/dL    A/G Ratio 1.4 g/dL    BUN/Creatinine Ratio 13.1 7.0 - 25.0     Anion Gap 9.6 5.0 - 15.0 mmol/L    eGFR 69.6 >60.0 mL/min/1.73   Troponin    Specimen: Arm, Left; Blood   Result Value Ref Range    Troponin T <0.010 0.000 - 0.030 ng/mL   Troponin    Specimen: Arm, Left; Blood   Result Value Ref Range    Troponin T <0.010 0.000 - 0.030 ng/mL   CBC Auto Differential    Specimen: Arm, Left; Blood   Result Value Ref Range    WBC 9.63 3.40 - 10.80 10*3/mm3    RBC 4.45 3.77 - 5.28 10*6/mm3    Hemoglobin 13.4 12.0 - 15.9 g/dL    Hematocrit 40.6 34.0 - 46.6 %    MCV 91.2 79.0 - 97.0 fL    MCH 30.1 26.6 - 33.0 pg    MCHC 33.0 31.5 - 35.7 g/dL    RDW 13.0 12.3 - 15.4 %    RDW-SD 43.1 37.0 - 54.0 fl    MPV 9.8 6.0 - 12.0 fL    Platelets 295 140 - 450 10*3/mm3    Neutrophil % 51.3 42.7 - 76.0 %    Lymphocyte % 34.8 19.6 - 45.3 %    Monocyte % 7.8 5.0 - 12.0 %    Eosinophil % 4.7 0.3 - 6.2 %    Basophil % 0.8 0.0 - 1.5 %    Immature Grans % 0.6 (H) 0.0 - 0.5 %    Neutrophils, Absolute 4.94 1.70 - 7.00 10*3/mm3    Lymphocytes, Absolute 3.35 (H) 0.70 - 3.10 10*3/mm3    Monocytes, Absolute 0.75 0.10 - 0.90 10*3/mm3    Eosinophils, Absolute 0.45 (H) 0.00 - 0.40 10*3/mm3    Basophils, Absolute 0.08 0.00 - 0.20 10*3/mm3    Immature Grans, Absolute 0.06 (H) 0.00 - 0.05 10*3/mm3    nRBC 0.0 0.0 - 0.2 /100 WBC   BNP    Specimen: Arm, Left; Blood   Result Value Ref Range    proBNP 91.5 0.0 - 900.0 pg/mL   TSH    Specimen: Arm, Left; Blood   Result Value Ref Range    TSH 3.840 0.270 - 4.200 uIU/mL   T4, Free    Specimen: Arm, Left; Blood   Result Value Ref Range    Free T4 0.95 0.93 - 1.70 ng/dL   Hemoglobin A1c    Specimen: Arm, Left; Blood   Result Value Ref Range    Hemoglobin A1C 5.50 4.80 - 5.60 %   Stress Test With Myocardial Perfusion One Day   Result Value Ref Range    Target HR (85%) 145 bpm    Max. Pred. HR (100%) 170 bpm    Nuclear Prior Study 3.0     Exercise duration (min) 4 min    Exercise duration (sec) 5 sec    Estimated workload 7.2 METS    BH CV REST NUCLEAR ISOTOPE DOSE 10.7 mCi     BH CV STRESS NUCLEAR ISOTOPE DOSE 31.3 mCi    BH CV STRESS PROTOCOL 1 [place]     Stage 1 1     HR Stage 1 133     BP Stage 1 126/48     Duration Min Stage 1 3     Duration Sec Stage 1 0     Grade Stage 1 10     Speed Stage 1 1.7     BH CV STRESS METS STAGE 1 5     Stage 2 2     HR Stage 2 153     BP Stage 2 148/53     Duration Min Stage 2 1     Duration Sec Stage 2 5     Grade Stage 2 12     Speed Stage 2 2.5     BH CV STRESS METS STAGE 2 7.5     Baseline HR 71 bpm    Baseline /55 mmHg    Peak  bpm    Percent Max Pred HR 90.00 %    Percent Target  %    Peak /53 mmHg    Recovery HR 97 bpm    Recovery /57 mmHg    Nuc Stress EF 63 %   ECG 12 Lead Chest Pain   Result Value Ref Range    QT Interval 390 ms    QTC Interval 449 ms   Green Top (Gel)   Result Value Ref Range    Extra Tube Hold for add-ons.    Lavender Top   Result Value Ref Range    Extra Tube hold for add-on    Gold Top - SST   Result Value Ref Range    Extra Tube Hold for add-ons.    Light Blue Top   Result Value Ref Range    Extra Tube Hold for add-ons.          ED Course  ED Course as of 01/20/23 1304   Thu Jan 19, 2023   1638 EKG 16:36 NSR, rate 80. Borderline eCG. qT/qTc 390/449 [CLOVER]   2324 XR Chest 1 View  IMPRESSION:  Prominent perihilar pulmonary interstitial markings which may reflect vascular congestion. No focal consolidation, pleural effusion, or pneumothorax. [MB]   Fri Jan 20, 2023   0030 Patient has never had a stress test or 2D echo.  She reports chest pain has been persistent and and still present at this time.  Heart score is a 4.  Patient is agreeable to admission for chest pain rule out.  We do not have any open beds at this time, will board in the ED. [MB]   0306 Cardiology consult placed in a.m. [MB]   1301 Dr. Anderson read stress test and patient was discussed with him. Advises that stress test is unremarkable. Advises that patient can follow up in his office in 1-2 weeks.   [ANA]      ED Course User  Index  [ANA] Sascha Phelps APRN  [CLOVER] Angel Ramos MD  [MB] Lelia Kilgore APRN                                           Medical Decision Making  Patient is a 52-year-old female with past medical history significant for hypertension, diabetes mellitus, and anxiety/depression.  She presents to the ED today with complaints of chest pain that is midsternal that started this morning.  She states that it has been persistent throughout the day.  She describes the pain as sharp.  She states that her pain is now a 2 out of 10.  She reports some mild shortness of breath.  She states that she has never had chest pain before in the past.  Patient reports that she has never had a stress test or 2D echo.  She reports a positive family history with father having heart attack.  She denies any other significant complaints.    Chest pain, unspecified type: acute illness or injury  Amount and/or Complexity of Data Reviewed  Labs: ordered. Decision-making details documented in ED Course.  Radiology: ordered. Decision-making details documented in ED Course.  ECG/medicine tests: ordered. Decision-making details documented in ED Course.      Risk  OTC drugs.  Prescription drug management.  Decision regarding hospitalization.          Final diagnoses:   Chest pain, unspecified type       ED Disposition  ED Disposition     ED Disposition   Discharge    Condition   Stable    Comment   --             Alistair Anderson MD   [address] [place] [address] [phone]    Schedule an appointment as soon as possible for a visit   For further evaluation         Medication List      No changes were made to your prescriptions during this visit.          Sascha Phelps APRN  01/20/23 8471

## 2023-01-20 NOTE — CONSULTS
Date of Admit: 1/19/2023  Date of Consult: 01/20/23  Provider, No Known        * No active hospital problems. *      Assessment      1.       Recommendations     1.         Reason for consultation: Chest pain    Subjective       Subjective     History of Present Illness    Briseyda Llamas is a 52-year-old female with a past medical history significant for essential hypertension, tobacco abuse and prediabetes.  Patient presented to the ED with complaints of chest pain.  She states the chest pain started yesterday morning around 9 AM.  It is intermittent in nature and lasts for a few minutes at a time and then goes away but shortly comes back.  Describes it as a sharp/stabbing pain in the left side of her chest.  She is tender to palpation in the middle of her chest.  She does have associated shortness of breath and the pain is worse with deep breathing.  She reports smoking 1 pack a day for around 40 years but recently quit around 6 to 8 months ago and is now vaping.  Reports a history of premature coronary artery disease in her dad who had an MI in his 40s.  Denies any history herself of coronary artery disease.  Troponin is negative.  EKG showed no acute ischemia.    Cardiac risk factors:hypertension, smoking, Positive family Hx. of premature athersclerotivc disease. and Obesity    Past Medical History:   Diagnosis Date   • Anxiety and depression    • Arthritis    • Back pain    • Body piercing     both ears   • Cancer (HCC)     MELANOMA-right breast, back   • Diabetes mellitus (HCC)    • Diverticulitis    • GERD (gastroesophageal reflux disease)    • H/O bladder infections    • Hiatal hernia    • History of being tatooed    • Hot flashes    • Hypertension    • Kidney stones    • Menstrual changes    • Pelvic pain    • Pneumonia    • PONV (postoperative nausea and vomiting)    • Urinary incontinence    • Vitamin D deficiency    • Wears glasses     for reading     Past Surgical History:   Procedure Laterality Date    • ABDOMINAL SURGERY     • BLADDER SURGERY      sling x 2   •  SECTION      x1   • CHOLECYSTECTOMY     • COLONOSCOPY     • CYSTOSCOPY URETEROSCOPY LASER LITHOTRIPSY Right 2021    Procedure: CYSTOSCOPY URETEROSCOPY RETROGRADE RIGHT, URETEROGRAM;  Surgeon: Steven Wayne MD;  Location: [facility] OR;  Service: Urology;  Laterality: Right;   • ENDOSCOPY     • INTERSTIM REMOVAL N/A 10/23/2019    Procedure: INTERSTIM REMOVAL;  Surgeon: Jose Adams MD;  Location: [facility] OR;  Service: Urology   • SEPTOPLASTY     • SKIN BIOPSY     • TONSILLECTOMY AND ADENOIDECTOMY     • TUBAL ABDOMINAL LIGATION       Family History   Problem Relation Age of Onset   • Diabetes Mother    • Arthritis Mother    • Cancer Mother         renal cell carconma   • Diabetes Father    • Hypertension Father    • Asthma Son    • Breast cancer Neg Hx      Social History     Tobacco Use   • Smoking status: Every Day     Packs/day: 0.50     Years: 30.00     Pack years: 15.00     Types: Cigarettes   • Smokeless tobacco: Never   Vaping Use   • Vaping Use: Some days   • Substances: Nicotine, Flavoring   • Devices: Disposable   Substance Use Topics   • Alcohol use: No   • Drug use: No     (Not in a hospital admission)    Allergies:  Penicillins    Review of Systems   Constitutional: Negative for diaphoresis, fatigue and fever.   HENT: Negative for congestion and trouble swallowing.    Eyes: Negative for photophobia and visual disturbance.   Respiratory: Positive for shortness of breath. Negative for chest tightness and wheezing.    Cardiovascular: Positive for chest pain. Negative for palpitations.   Gastrointestinal: Negative for abdominal pain, nausea and vomiting.   Endocrine: Negative for polyphagia and polyuria.   Genitourinary: Negative for hematuria.   Musculoskeletal: Negative for back pain and neck stiffness.   Skin: Negative for rash and wound.   Allergic/Immunologic: Negative for food allergies and immunocompromised  state.   Neurological: Negative for dizziness and syncope.   Hematological: Negative for adenopathy. Does not bruise/bleed easily.   Psychiatric/Behavioral: Negative for confusion. The patient is not nervous/anxious.        Objective       Objective      Vital Signs  Temp:  [97.1 °F (36.2 °C)-98.4 °F (36.9 °C)] 98.4 °F (36.9 °C)  Heart Rate:  [] 64  Resp:  [16-18] 16  BP: (131-142)/(73-81) 131/81  Vital Signs (last 72 hrs)       01/17 0700  01/18 0659 01/18 0700  01/19 0659 01/19 0700  01/20 0659 01/20 0700  01/20 0832   Most Recent      Temp (°F)     97.1 -  98.4       98.4 (36.9) 01/19 1640    Heart Rate     64 -  118       64 01/19 2300    Resp     16 -  18       16 01/19 1640    BP     131/81 -  142/73       131/81 01/19 2300    SpO2 (%)     98 -  99       99 01/19 2300        Body mass index is 43.07 kg/m².  Documented weights    01/19/23 1638 01/19/23 1640   Weight: 68 kg (150 lb) 125 kg (275 lb)          No intake or output data in the 24 hours ending 01/20/23 0832  Physical Exam  Constitutional:       General: She is not in acute distress.     Appearance: Normal appearance. She is well-developed and normal weight.   HENT:      Head: Normocephalic and atraumatic.   Eyes:      General: Lids are normal.      Conjunctiva/sclera: Conjunctivae normal.      Pupils: Pupils are equal, round, and reactive to light.   Neck:      Vascular: No carotid bruit or JVD.   Cardiovascular:      Rate and Rhythm: Normal rate and regular rhythm.      Pulses: Normal pulses.           Radial pulses are 2+ on the right side and 2+ on the left side.        Dorsalis pedis pulses are 2+ on the right side and 2+ on the left side.        Posterior tibial pulses are 2+ on the right side and 2+ on the left side.      Heart sounds: Normal heart sounds, S1 normal and S2 normal. No murmur heard.  Pulmonary:      Effort: Pulmonary effort is normal. No respiratory distress.      Breath sounds: Normal breath sounds. No wheezing.    Abdominal:      General: Bowel sounds are normal. There is no distension.      Palpations: Abdomen is soft. There is no hepatomegaly or splenomegaly.      Tenderness: There is no abdominal tenderness.   Musculoskeletal:         General: No swelling. Normal range of motion.      Cervical back: Normal range of motion and neck supple.      Right lower leg: No edema.      Left lower leg: No edema.      Comments: Tenderness to palpation of chest wall   Skin:     General: Skin is warm and dry.      Coloration: Skin is not jaundiced.      Findings: No rash.   Neurological:      General: No focal deficit present.      Mental Status: She is alert and oriented to person, place, and time. Mental status is at baseline.   Psychiatric:         Mood and Affect: Mood normal.         Speech: Speech normal.         Behavior: Behavior normal.         Thought Content: Thought content normal.         Judgment: Judgment normal.         Results review     Results Review:    I reviewed the patient's new clinical results.  Results from last 7 days   Lab Units 01/19/23  1952 01/19/23  1714   TROPONIN T ng/mL <0.010 <0.010     Results from last 7 days   Lab Units 01/19/23  1714   WBC 10*3/mm3 9.63   HEMOGLOBIN g/dL 13.4   PLATELETS 10*3/mm3 295     Results from last 7 days   Lab Units 01/19/23  1714   SODIUM mmol/L 138   POTASSIUM mmol/L 4.2   CHLORIDE mmol/L 105   CO2 mmol/L 23.4   BUN mg/dL 13   CREATININE mg/dL 0.99   CALCIUM mg/dL 9.2   GLUCOSE mg/dL 100*   ALT (SGPT) U/L 19   AST (SGOT) U/L 45*     Lab Results   Component Value Date    INR 0.91 05/07/2021     No results found for: MG  Lab Results   Component Value Date    TSH 3.840 01/19/2023      Lab Results   Component Value Date    PROBNP 91.5 01/19/2023          ECG/EMG Results (last 24 hours)     Procedure Component Value Units Date/Time    ECG 12 Lead Chest Pain [[id number]] Collected: 01/19/23 1636     Updated: 01/19/23 1808     QT Interval 390 ms      QTC Interval 449 ms      Narrative:      Test Reason : Chest Pain  Blood Pressure :   */*   mmHG  Vent. Rate :  80 BPM     Atrial Rate :  80 BPM     P-R Int : 162 ms          QRS Dur :  84 ms      QT Int : 390 ms       P-R-T Axes :  49  51  48 degrees     QTc Int : 449 ms    Normal sinus rhythm  Possible Left atrial enlargement  Borderline ECG  When compared with ECG of 18-JUL-2022 14:32,  No significant change was found    Referred By: FELICIANO           Confirmed By:           Imaging Results (Last 72 Hours)     Procedure Component Value Units Date/Time    XR Chest 1 View [[id number]] Collected: 01/19/23 1804     Updated: 01/19/23 1806    Narrative:      CR Chest 1 Vw    INDICATION:   Chest Pain Protocol     COMPARISON:    Chest x-ray 3/4/2011    FINDINGS:  Portable AP view(s) of the chest.      Cardiomediastinal silhouette is within normal limits.  Prominent hilar pulmonary interstitial markings. No focal pulmonary consolidation. No pleural effusion. No pneumothorax.  No acute osseous abnormality.      Impression:      Prominent perihilar pulmonary interstitial markings which may reflect vascular congestion. No focal consolidation, pleural effusion, or pneumothorax.    Signer Name: Cuate Michaud MD   Signed: 1/19/2023 6:04 PM   Workstation Name: [id number]    [facility]          I have discussed my impression and recommendations with the patient and family.    Thank you very much for asking us to be involved in this patient's care.  We will follow along with you.      Electronically signed by JANICE Silva, 01/20/23, 8:32 AM EST.     Please note that portions of this note were completed with a voice recognition program.

## 2023-03-01 ENCOUNTER — OFFICE VISIT (OUTPATIENT)
Dept: UROLOGY | Facility: CLINIC | Age: 53
End: 2023-03-01
Payer: COMMERCIAL

## 2023-03-01 VITALS — HEIGHT: 67 IN | WEIGHT: 275 LBS | BODY MASS INDEX: 43.16 KG/M2

## 2023-03-01 DIAGNOSIS — R35.0 URINE FREQUENCY: Primary | ICD-10-CM

## 2023-03-01 DIAGNOSIS — N30.01 ACUTE CYSTITIS WITH HEMATURIA: ICD-10-CM

## 2023-03-01 DIAGNOSIS — N30.11 INTERSTITIAL CYSTITIS (CHRONIC) WITH HEMATURIA: ICD-10-CM

## 2023-03-01 DIAGNOSIS — R35.0 FREQUENCY OF MICTURITION: ICD-10-CM

## 2023-03-01 DIAGNOSIS — N32.81 DETRUSOR INSTABILITY: ICD-10-CM

## 2023-03-01 DIAGNOSIS — R33.9 INCOMPLETE EMPTYING OF BLADDER: ICD-10-CM

## 2023-03-01 LAB
BILIRUB BLD-MCNC: NEGATIVE MG/DL
CLARITY, POC: CLEAR
COLOR UR: YELLOW
EXPIRATION DATE: ABNORMAL
GLUCOSE UR STRIP-MCNC: NEGATIVE MG/DL
KETONES UR QL: NEGATIVE
LEUKOCYTE EST, POC: NEGATIVE
Lab: ABNORMAL
NITRITE UR-MCNC: NEGATIVE MG/ML
PH UR: 5.5 [PH] (ref 5–8)
PROT UR STRIP-MCNC: ABNORMAL MG/DL
RBC # UR STRIP: ABNORMAL /UL
SP GR UR: 1.01 (ref 1–1.03)
UROBILINOGEN UR QL: NORMAL

## 2023-03-01 PROCEDURE — 99214 OFFICE O/P EST MOD 30 MIN: CPT | Performed by: NURSE PRACTITIONER

## 2023-03-01 PROCEDURE — 87086 URINE CULTURE/COLONY COUNT: CPT | Performed by: NURSE PRACTITIONER

## 2023-03-01 PROCEDURE — 51798 US URINE CAPACITY MEASURE: CPT | Performed by: NURSE PRACTITIONER

## 2023-03-01 RX ORDER — GENTAMICIN SULFATE 80 MG/100ML
80 INJECTION, SOLUTION INTRAVENOUS ONCE
Status: CANCELLED | OUTPATIENT
Start: 2023-03-13 | End: 2023-03-01

## 2023-03-01 RX ORDER — PYRAZINAMIDE 500 MG/1
TABLET ORAL
COMMUNITY
Start: 2023-02-28

## 2023-03-01 NOTE — PROGRESS NOTES
Chief Complaint  acute cystitis with hematuria  (6 MONTHS FOLLOW UP IC/DYSURIA/BLADDER PAIN))    Pool Edouard presents to [facility] for DETRUSOR INSTABILITY/URINE CINCONTINENCE  History of Present Illness    Mrs. Briseyda Edouard is a pleasant 52-year-old female who returns to clinic today for evaluation. 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     THE Patient is post bladder hydro distention procedure /BOTOX for chronic interstitial cystitis with Dr. Wayne. Last year she had done relatively well with intermittent episodes of dysuria for which her urine cultures have all been negative for any UTI. She reports, bothersome bladder pain and discomfort with worsening nocturia. She did report occasional straining with urination, with episodes of incontinence without any sensory awareness.    On initial evaluation in clinic today, her urine dipstick is completely negative for any infection, it is negative for gross, but showed 3+ microscopic hematuria.  Her PVR is 0 mL the patient has been on conservative therapy, increasing her fluid intake and avoiding caffeine products, spicy foods, citrusy foods. We had discussed repeating a lower tract investigation during her last evaluation and patient was scheduled on 07/22/2022, however, she rescheduled that appointment secondary to being ill.    The patient states she is doing well. She was scheduled to have a Botox injection on 07/2022, but it was cancelled due to her testing positive for COVID-19 when she went to the hospital on 06/2022 for pre-op testing.     She states she is NOW experiencing worsening urinary incontinence episodes, urinary burning, and cramping.  We overactive bladder symptoms that are becoming very bothersome to her.  She has had UTIs since her last visit to check  [facility]urg[facility] treatment [facility] PCP but without any positive urine culture she states. She is not taking any medication for bladder at the moment states she ran out . She experiences nocturia 3 to 4 times at night. She states she drinks a water bottle before bed.  She is using Premarin 0.3 mg for vaginal dryness and burning. She denies any issues with her bowels.    She is taking metformin twice a day.    Objective   Vital Signs:   Ht 170.2 cm (67.01\")   Wt 125 kg (275 lb)   BMI 43.06 kg/m²       ROS:   Review of Systems   Constitutional: Positive for activity change. Negative for chills, fatigue and fever.   HENT: Negative for congestion, ear pain, hearing loss, nosebleeds, sneezing, tinnitus and trouble swallowing.    Eyes: Negative for blurred vision, pain, discharge, redness and itching.   Respiratory: Negative for cough, choking, chest tightness, shortness of breath and wheezing.    Cardiovascular: Negative for chest pain, palpitations and leg swelling.   Gastrointestinal: Positive for abdominal distention, abdominal pain and nausea. Negative for constipation, diarrhea, vomiting and indigestion.   Endocrine: Negative for cold intolerance and heat intolerance.   Genitourinary: Positive for flank pain, frequency, hematuria, pelvic pain and urgency. Negative for difficulty urinating, dyspareunia, dysuria, genital sores, pelvic pressure, urinary incontinence and vaginal discharge.   Musculoskeletal: Positive for back pain.   Skin: Positive for color change.   Allergic/Immunologic: Negative for environmental allergies, food allergies and immunocompromised state.   Neurological: Negative for dizziness, seizures, syncope, light-headedness, numbness, headache and confusion.   Hematological: Bruises/bleeds easily.   Psychiatric/Behavioral: Positive for depressed mood. Negative for behavioral problems, decreased concentration, hallucinations and suicidal ideas. The patient is nervous/anxious.         Physical " Exam  Constitutional:       General: She is in acute distress.      Appearance: She is well-developed. She is obese. She is ill-appearing.   HENT:      Head: Normocephalic and atraumatic.   Eyes:      Pupils: Pupils are equal, round, and reactive to light.   Neck:      Thyroid: No thyromegaly.      Trachea: No tracheal deviation.   Cardiovascular:      Rate and Rhythm: Normal rate and regular rhythm.      Heart sounds: No murmur heard.  Pulmonary:      Effort: Pulmonary effort is normal. No respiratory distress.      Breath sounds: Normal breath sounds. No stridor. No wheezing.   Abdominal:      General: Bowel sounds are normal. There is distension.      Palpations: Abdomen is soft.      Tenderness: There is abdominal tenderness. There is guarding.   Genitourinary:     Labia:         Right: No tenderness.         Left: No tenderness.       Vagina: Normal. No vaginal discharge.      Comments: Reports urinary incontinence, frequency, urgency, dysuria  Musculoskeletal:         General: Tenderness present. No deformity. Normal range of motion.      Cervical back: Normal range of motion.   Skin:     General: Skin is warm and dry.      Capillary Refill: Capillary refill takes less than 2 seconds.      Coloration: Skin is not pale.      Findings: No erythema or rash.   Neurological:      Mental Status: She is alert and oriented to person, place, and time.      Cranial Nerves: No cranial nerve deficit.      Sensory: No sensory deficit.      Motor: Weakness present.      Coordination: Coordination normal.   Psychiatric:         Behavior: Behavior normal.         Thought Content: Thought content normal.         Judgment: Judgment normal.        Result Review :     UA    Urinalysis 3/16/22 3/16/22 7/12/22 3/1/23    1721 1721     Squamous Epithelial Cells, UA  3-6 (A)     Specific Gravity, UA 1.016      Ketones, UA Trace (A)  Negative Negative   Blood, UA Large (3+) (A)      Leukocytes, UA Small (1+) (A)  Moderate (2+) (A)  Negative   Nitrite, UA Negative      RBC, UA  Too Numerous to Count (A)     WBC, UA  6-12 (A)     Bacteria, UA  1+ (A)     (A) Abnormal value            Urine Culture    Urine Culture 7/12/22 3/1/23   Urine Culture <25,000 CFU/mL Normal Urogenital Belkis 25,000 CFU/mL Mixed Belkis Isolated                 Assessment and Plan    Problem List Items Addressed This Visit        Genitourinary and Reproductive     Detrusor instability    Relevant Medications    Vibegron (Gemtesa) 75 MG tablet    Other Relevant Orders    Case Request (Completed)    Urinary tract infection with hematuria    Relevant Medications    Vibegron (Gemtesa) 75 MG tablet    Other Relevant Orders    Case Request (Completed)    Urine frequency - Primary    Overview     Added automatically from request for surgery [id number]         Relevant Medications    Vibegron (Gemtesa) 75 MG tablet    Other Relevant Orders    POC Urinalysis Dipstick, Automated (Completed)    Case Request (Completed)    Frequency of micturition    Overview     Added automatically from request for surgery [id number]         Relevant Medications    Vibegron (Gemtesa) 75 MG tablet    Other Relevant Orders    Urine Culture - Urine, Urine, Random Void (Completed)    Case Request (Completed)    Incomplete emptying of bladder    Overview     Added automatically from request for surgery [id number]         Relevant Orders    Bladder Scan (Completed)    Case Request (Completed)    Interstitial cystitis (chronic) with hematuria    Overview     Added automatically from request for surgery [id number]         Relevant Medications    Vibegron (Gemtesa) 75 MG tablet    Other Relevant Orders    Case Request (Completed)                                                  ASSESSMENT  ACUTE CYSTITIS WITH HEMATURIA/DETRUSOR INSTABILITY/FLANK PAIN  Ms. Briseyda Gonzalez  is a pleasant 52-year-old patient, who RETURNS to clinic today for  EVALUATION, with concerns for Acute cystitis with Hematuria, Flank Pain and detrusors  instability.Her Urine dipstick today is negative for any leukocyte estrace, otherwise is completely negative for any infection.  It is negative for gross hematuria.  She has 3+ microscopic hematuria which is chronic for her(states she has always had microscopic hematuria for over 20+ years).     Interstitial Cystitis-we discussed the diagnosis and management of this condition.  I indicated that it was a multifactorial condition with multifactorial symptomatology, Including frequency, urgency, the sensation of urinary tract infection in the absence of a positive culture, sexual symptomatology including significant dyspareunia. We discussed treatment options including the importance of making a clinical diagnosis and the cystoscopic findings including Hunner's ulcers etc.       We also discussed medical management including pharmacologic treatment such as amitriptyline, naturopathic treatments such as pumpkin oil and which more aggressive options of Botox injections as well as the relative risks and merits of this.  We also discussed the use of dietary manipulation including the over-the-counter product relief which basically decreases the acid in the urine and avoidance of ascitic-containing foods such as citrus is etc.                                                  PLAN  THE PATIENT HAS BEEN RESCHEDULED FOR CYSTOSCOPY , WITH BOTOX INJECTIONS TO BLADDER ON 03/13/2023 WITH DR. LEVY.    We resent her urine for culture. I will call her with results if any bacteria growth     Start Gemtesa 50 mg nightly-detrusor instability/severe urinary incontinence/IC      She has been encouraged to  increase her p.o. fluid intake to at least 1 to 2 L daily and avoid bladder irritants such as caffeine products, spicy foods, and citrusy foods.  Stopping all p.o. fluids 2 hours prior to bedtime      I recommend concomitant probiotics with treatment with antibiotics to protect the rectal reservoir including over-the-counter yogurt  preparations to shannan oral pills containing the appropriate probiotics. Patient reports the diligent use of Probiotics.     She is to also continue other medications for atrophic/yeast vaginitis, atrophic vaginitis as prescribed above.    WILL FOLLOW UP IN CLINIC POST PROCEDURE,     SHE MAY RETURN SOONER IF NEED BE     PATIEN IS AGREEABLE WITH PLAN OF CARE     Patient reports that she is not currently experiencing any symptoms of urinary incontinence.    Class 3 Severe Obesity (BMI >=40). Obesity-related health conditions include the following: obstructive sleep apnea, hypertension, coronary heart disease, diabetes mellitus, GERD and urinary stress incontinence. Obesity is improving with lifestyle modifications. BMI is 43.06 kg/M2. We discussed portion control and increasing exercise.      RADIOLOGY (CT AND/OR KUB):    CT Abdomen and Pelvis: Results for orders placed during the hospital encounter of 06/13/17    CT Abdomen Pelvis With & Without Contrast    Narrative  CT ABDOMEN AND PELVIS WITH AND WITHOUT CONTRAST-    CLINICAL INDICATION: Gross hematuria; N39.0-Urinary tract infection,  site not specified; R31.0-Gross hematuria.      DOSE: 3182.06 mGy.cm  COMPARISON: 12/28/2016.    Radiation dose reduction techniques were utilized per ALARA protocol.  Automated exposure control was initiated through either or CareDoHabbits or  DoseRigTrapit software packages by  protocol.      PROCEDURE: Axial images were acquired from the lung bases through the  pubic symphysis before and after IV contrast. No oral contrast was  administered.    FINDINGS: Lung bases show no pleural effusions.    There is a moderate sized hiatal hernia.    The liver is homogeneous but slightly decreased in attenuation  compatible with fatty infiltration of the liver.    Spleen is homogeneous.    There is no adrenal enlargement.    Kidneys show no hydronephrosis or hydroureter. No evidence of a solid  renal mass.    Cysts are seen on the ovaries  bilaterally. The uterus is heterogeneous.    The bladder is somewhat decompressed.    Impression  Heterogeneity of the uterus.  Bilateral adnexal cysts.  Other findings as above.    This report was finalized on 6/13/2017 9:49 AM by Dr. Hossein Shine MD.     CT Stone Protocol: Results for orders placed during the hospital encounter of 08/16/19    CT Abdomen Pelvis Stone Protocol    Narrative  CT ABDOMEN PELVIS STONE PROTOCOL-    REASON FOR EXAM: Hematuria; R31.29-Other microscopic hematuria    COMPARISON: Today's CT is compared with an earlier CT scan done in  06/2017.    FINDINGS: Spiral scans were obtained through the kidneys, ureters, and  bladder. The kidneys are normal in size and shape. There was no  hydronephrosis. There were no stones in the intrarenal collecting  systems. There were no stones noted along the course of the ureters.  Urinary bladder is smooth in contour.    Impression  No evidence of stone or obstruction was seen involving the  collecting system of either kidney.    846.43 mGy.cm  The radiation dose reduction device was utilized for each scan per the  ALARA (as low as reasonably achievable) protocol.    This report was finalized on 8/16/2019 2:07 PM by Dr. Chapo Rosales II, MD.     KUB: No results found for this or any previous visit.       LABS (3 MONTHS):    Office Visit on 03/01/2023   Component Date Value Ref Range Status   • Color 03/01/2023 Yellow  Yellow, Straw, Dark Yellow, Jennifer Final   • Clarity, UA 03/01/2023 Clear  Clear Final   • Specific Gravity  03/01/2023 1.015  1.005 - 1.030 Final   • pH, Urine 03/01/2023 5.5  5.0 - 8.0 Final   • Leukocytes 03/01/2023 Negative  Negative Final   • Nitrite, UA 03/01/2023 Negative  Negative Final   • Protein, POC 03/01/2023 Trace (A)  Negative mg/dL Final   • Glucose, UA 03/01/2023 Negative  Negative mg/dL Final   • Ketones, UA 03/01/2023 Negative  Negative Final   • Urobilinogen, UA 03/01/2023 Normal  Normal, 0.2 E.U./dL Final   • Bilirubin  03/01/2023 Negative  Negative Final   • Blood, UA 03/01/2023 3+ (A)  Negative Final   • Lot Number 03/01/2023 [id number]   Final   • Expiration Date 03/01/2023 2/8/24   Final   • Urine Culture 03/01/2023 25,000 CFU/mL Mixed Belkis Isolated   Final   Admission on 01/19/2023, Discharged on 01/20/2023   Component Date Value Ref Range Status   • QT Interval 01/19/2023 390  ms Final   • QTC Interval 01/19/2023 449  ms Final   • Glucose 01/19/2023 100 (H)  65 - 99 mg/dL Final   • BUN 01/19/2023 13  6 - 20 mg/dL Final   • Creatinine 01/19/2023 0.99  0.57 - 1.00 mg/dL Final   • Sodium 01/19/2023 138  136 - 145 mmol/L Final   • Potassium 01/19/2023 4.2  3.5 - 5.2 mmol/L Final    Slight hemolysis detected by analyzer. Results may be affected.   • Chloride 01/19/2023 105  98 - 107 mmol/L Final   • CO2 01/19/2023 23.4  22.0 - 29.0 mmol/L Final   • Calcium 01/19/2023 9.2  8.6 - 10.5 mg/dL Final   • Total Protein 01/19/2023 6.9  6.0 - 8.5 g/dL Final   • Albumin 01/19/2023 4.0  3.5 - 5.2 g/dL Final   • ALT (SGPT) 01/19/2023 19  1 - 33 U/L Final   • AST (SGOT) 01/19/2023 45 (H)  1 - 32 U/L Final   • Alkaline Phosphatase 01/19/2023 90  39 - 117 U/L Final   • Total Bilirubin 01/19/2023 0.2  0.0 - 1.2 mg/dL Final   • Globulin 01/19/2023 2.9  gm/dL Final   • A/G Ratio 01/19/2023 1.4  g/dL Final   • BUN/Creatinine Ratio 01/19/2023 13.1  7.0 - 25.0 Final   • Anion Gap 01/19/2023 9.6  5.0 - 15.0 mmol/L Final   • eGFR 01/19/2023 69.6  >60.0 mL/min/1.73 Final   • Troponin T 01/19/2023 <0.010  0.000 - 0.030 ng/mL Final   • Troponin T 01/19/2023 <0.010  0.000 - 0.030 ng/mL Final   • Extra Tube 01/19/2023 Hold for add-ons.   Final    Auto resulted.   • Extra Tube 01/19/2023 hold for add-on   Final    Auto resulted   • Extra Tube 01/19/2023 Hold for add-ons.   Final    Auto resulted.   • Extra Tube 01/19/2023 Hold for add-ons.   Final    Auto resulted   • WBC 01/19/2023 9.63  3.40 - 10.80 10*3/mm3 Final   • RBC 01/19/2023 4.45  3.77 - 5.28  10*6/mm3 Final   • Hemoglobin 01/19/2023 13.4  12.0 - 15.9 g/dL Final   • Hematocrit 01/19/2023 40.6  34.0 - 46.6 % Final   • MCV 01/19/2023 91.2  79.0 - 97.0 fL Final   • MCH 01/19/2023 30.1  26.6 - 33.0 pg Final   • MCHC 01/19/2023 33.0  31.5 - 35.7 g/dL Final   • RDW 01/19/2023 13.0  12.3 - 15.4 % Final   • RDW-SD 01/19/2023 43.1  37.0 - 54.0 fl Final   • MPV 01/19/2023 9.8  6.0 - 12.0 fL Final   • Platelets 01/19/2023 295  140 - 450 10*3/mm3 Final   • Neutrophil % 01/19/2023 51.3  42.7 - 76.0 % Final   • Lymphocyte % 01/19/2023 34.8  19.6 - 45.3 % Final   • Monocyte % 01/19/2023 7.8  5.0 - 12.0 % Final   • Eosinophil % 01/19/2023 4.7  0.3 - 6.2 % Final   • Basophil % 01/19/2023 0.8  0.0 - 1.5 % Final   • Immature Grans % 01/19/2023 0.6 (H)  0.0 - 0.5 % Final   • Neutrophils, Absolute 01/19/2023 4.94  1.70 - 7.00 10*3/mm3 Final   • Lymphocytes, Absolute 01/19/2023 3.35 (H)  0.70 - 3.10 10*3/mm3 Final   • Monocytes, Absolute 01/19/2023 0.75  0.10 - 0.90 10*3/mm3 Final   • Eosinophils, Absolute 01/19/2023 0.45 (H)  0.00 - 0.40 10*3/mm3 Final   • Basophils, Absolute 01/19/2023 0.08  0.00 - 0.20 10*3/mm3 Final   • Immature Grans, Absolute 01/19/2023 0.06 (H)  0.00 - 0.05 10*3/mm3 Final   • nRBC 01/19/2023 0.0  0.0 - 0.2 /100 WBC Final   • proBNP 01/19/2023 91.5  0.0 - 900.0 pg/mL Final   • TSH 01/19/2023 3.840  0.270 - 4.200 uIU/mL Final   • Free T4 01/19/2023 0.95  0.93 - 1.70 ng/dL Final   • Hemoglobin A1C 01/19/2023 5.50  4.80 - 5.60 % Final   • COVID19 01/20/2023 Not Detected  Not Detected - Ref. Range Final   • Influenza A PCR 01/20/2023 Not Detected  Not Detected Final   • Influenza B PCR 01/20/2023 Not Detected  Not Detected Final   • Target HR (85%) 01/20/2023 145  bpm Final   • Max. Pred. HR (100%) 01/20/2023 170  bpm Final   • Nuclear Prior Study 01/20/2023 3.0   Final   • Exercise duration (min) 01/20/2023 4  min Final   • Exercise duration (sec) 01/20/2023 5  sec Final   • Estimated workload 01/20/2023  7.2  METS Final   • BH CV REST NUCLEAR ISOTOPE DOSE 01/20/2023 10.7  mCi Final   • BH CV STRESS NUCLEAR ISOTOPE DOSE 01/20/2023 31.3  mCi Final   • BH CV STRESS PROTOCOL 1 01/20/2023 [place]   Final   • Stage 1 01/20/2023 1   Final   • HR Stage 1 01/20/2023 133   Final   • BP Stage 1 01/20/2023 126/48   Final   • Duration Min Stage 1 01/20/2023 3   Final   • Duration Sec Stage 1 01/20/2023 0   Final   • Grade Stage 1 01/20/2023 10   Final   • Speed Stage 1 01/20/2023 1.7   Final   • BH CV STRESS METS STAGE 1 01/20/2023 5   Final   • Stage 2 01/20/2023 2   Final   • HR Stage 2 01/20/2023 153   Final   • BP Stage 2 01/20/2023 148/53   Final   • Duration Min Stage 2 01/20/2023 1   Final   • Duration Sec Stage 2 01/20/2023 5   Final   • Grade Stage 2 01/20/2023 12   Final   • Speed Stage 2 01/20/2023 2.5   Final   • BH CV STRESS METS STAGE 2 01/20/2023 7.5   Final   • Baseline HR 01/20/2023 71  bpm Final   • Baseline BP 01/20/2023 119/55  mmHg Final   • Peak HR 01/20/2023 153  bpm Final   • Percent Max Pred HR 01/20/2023 90.00  % Final   • Percent Target HR 01/20/2023 106  % Final   • Peak BP 01/20/2023 148/53  mmHg Final   • Recovery HR 01/20/2023 97  bpm Final   • Recovery BP 01/20/2023 127/57  mmHg Final   • Nuc Stress EF 01/20/2023 63  % Final      1. Urinary incontinence.  2. Interstitial cystitis.    Plan:  - The patient's urine will be sent for a culture. I will call the patient with results if any positive bacteria growth.  - Start Gemtesa 75 mg nightly.  - Start oxybutynin 5 mg nightly.  - The patient will follow up in clinic post procedure. She may return sooner if her symptoms worsen.      Smoking Cessation Counseling:  Current every day smoker. less than 3 minutes spent counseling. Not agreeable to stopping.  I advised patient to quit tobacco use and offered support.  I provided patient with tobacco cessation educational material printed in the patient's After Visit Summary.     Follow Up   Return in about 12  days (around 3/13/2023) for Next scheduled follow up,With Dr. Wayne, Cystoscopy IM OR WITH BLADDER BOTOC FOR IC/OAB/DETRUSOR I.    Patient was given instructions and counseling regarding her condition or for health maintenance advice. Please see specific information pulled into the AVS if appropriate.          This document has been electronically signed by Griselda Cheng-Akwa, APRN   March 2, 2023 21:59 EST      Dictated Utilizing Dragon Dictation: Part of this note may be an electronic transcription/translation of spoken language to printed text using the Dragon Dictation System.     Transcribed from ambient dictation for Griselda Cheng-Akwa, APRN by Joanna Fletcher.  03/01/23   16:28 EST    Patient or patient representative verbalized consent to the visit recording.  I have personally performed the services described in this document as transcribed by the above individual, and it is both accurate and complete.  Griselda Cheng-Akwa, APRN  3/2/2023  21:54 EST

## 2023-03-01 NOTE — H&P (VIEW-ONLY)
Chief Complaint  acute cystitis with hematuria  (6 MONTHS FOLLOW UP IC/DYSURIA/BLADDER PAIN))    Pool Edouard presents to [facility] for DETRUSOR INSTABILITY/URINE CINCONTINENCE  History of Present Illness    Mrs. Briseyda Edouard is a pleasant 52-year-old female who returns to clinic today for evaluation. 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     THE Patient is post bladder hydro distention procedure /BOTOX for chronic interstitial cystitis with Dr. Wayne. Last year she had done relatively well with intermittent episodes of dysuria for which her urine cultures have all been negative for any UTI. She reports, bothersome bladder pain and discomfort with worsening nocturia. She did report occasional straining with urination, with episodes of incontinence without any sensory awareness.    On initial evaluation in clinic today, her urine dipstick is completely negative for any infection, it is negative for gross, but showed 3+ microscopic hematuria.  Her PVR is 0 mL the patient has been on conservative therapy, increasing her fluid intake and avoiding caffeine products, spicy foods, citrusy foods. We had discussed repeating a lower tract investigation during her last evaluation and patient was scheduled on 07/22/2022, however, she rescheduled that appointment secondary to being ill.    The patient states she is doing well. She was scheduled to have a Botox injection on 07/2022, but it was cancelled due to her testing positive for COVID-19 when she went to the hospital on 06/2022 for pre-op testing.     She states she is NOW experiencing worsening urinary incontinence episodes, urinary burning, and cramping.  We overactive bladder symptoms that are becoming very bothersome to her.  She has had UTIs since her last visit to check  [facility]urgent treatment centers PCP but without any positive urine culture she states. She is not taking any medication for bladder at the moment states she ran out . She experiences nocturia 3 to 4 times at night. She states she drinks a water bottle before bed.  She is using Premarin 0.3 mg for vaginal dryness and burning. She denies any issues with her bowels.    She is taking metformin twice a day.    Objective   Vital Signs:   Ht 170.2 cm (67.01\")   Wt 125 kg (275 lb)   BMI 43.06 kg/m²       ROS:   Review of Systems   Constitutional: Positive for activity change. Negative for chills, fatigue and fever.   HENT: Negative for congestion, ear pain, hearing loss, nosebleeds, sneezing, tinnitus and trouble swallowing.    Eyes: Negative for blurred vision, pain, discharge, redness and itching.   Respiratory: Negative for cough, choking, chest tightness, shortness of breath and wheezing.    Cardiovascular: Negative for chest pain, palpitations and leg swelling.   Gastrointestinal: Positive for abdominal distention, abdominal pain and nausea. Negative for constipation, diarrhea, vomiting and indigestion.   Endocrine: Negative for cold intolerance and heat intolerance.   Genitourinary: Positive for flank pain, frequency, hematuria, pelvic pain and urgency. Negative for difficulty urinating, dyspareunia, dysuria, genital sores, pelvic pressure, urinary incontinence and vaginal discharge.   Musculoskeletal: Positive for back pain.   Skin: Positive for color change.   Allergic/Immunologic: Negative for environmental allergies, food allergies and immunocompromised state.   Neurological: Negative for dizziness, seizures, syncope, light-headedness, numbness, headache and confusion.   Hematological: Bruises/bleeds easily.   Psychiatric/Behavioral: Positive for depressed mood. Negative for behavioral problems, decreased concentration, hallucinations and suicidal ideas. The patient is nervous/anxious.         Physical " Exam  Constitutional:       General: She is in acute distress.      Appearance: She is well-developed. She is obese. She is ill-appearing.   HENT:      Head: Normocephalic and atraumatic.   Eyes:      Pupils: Pupils are equal, round, and reactive to light.   Neck:      Thyroid: No thyromegaly.      Trachea: No tracheal deviation.   Cardiovascular:      Rate and Rhythm: Normal rate and regular rhythm.      Heart sounds: No murmur heard.  Pulmonary:      Effort: Pulmonary effort is normal. No respiratory distress.      Breath sounds: Normal breath sounds. No stridor. No wheezing.   Abdominal:      General: Bowel sounds are normal. There is distension.      Palpations: Abdomen is soft.      Tenderness: There is abdominal tenderness. There is guarding.   Genitourinary:     Labia:         Right: No tenderness.         Left: No tenderness.       Vagina: Normal. No vaginal discharge.      Comments: Reports urinary incontinence, frequency, urgency, dysuria  Musculoskeletal:         General: Tenderness present. No deformity. Normal range of motion.      Cervical back: Normal range of motion.   Skin:     General: Skin is warm and dry.      Capillary Refill: Capillary refill takes less than 2 seconds.      Coloration: Skin is not pale.      Findings: No erythema or rash.   Neurological:      Mental Status: She is alert and oriented to person, place, and time.      Cranial Nerves: No cranial nerve deficit.      Sensory: No sensory deficit.      Motor: Weakness present.      Coordination: Coordination normal.   Psychiatric:         Behavior: Behavior normal.         Thought Content: Thought content normal.         Judgment: Judgment normal.        Result Review :     UA    Urinalysis 3/16/22 3/16/22 7/12/22 3/1/23    1721 1721     Squamous Epithelial Cells, UA  3-6 (A)     Specific Gravity, UA 1.016      Ketones, UA Trace (A)  Negative Negative   Blood, UA Large (3+) (A)      Leukocytes, UA Small (1+) (A)  Moderate (2+) (A)  Negative   Nitrite, UA Negative      RBC, UA  Too Numerous to Count (A)     WBC, UA  6-12 (A)     Bacteria, UA  1+ (A)     (A) Abnormal value            Urine Culture    Urine Culture 7/12/22 3/1/23   Urine Culture <25,000 CFU/mL Normal Urogenital Belkis 25,000 CFU/mL Mixed Belkis Isolated                 Assessment and Plan    Problem List Items Addressed This Visit        Genitourinary and Reproductive     Detrusor instability    Relevant Medications    Vibegron (Gemtesa) 75 MG tablet    Other Relevant Orders    Case Request (Completed)    Urinary tract infection with hematuria    Relevant Medications    Vibegron (Gemtesa) 75 MG tablet    Other Relevant Orders    Case Request (Completed)    Urine frequency - Primary    Overview     Added automatically from request for surgery [id number]         Relevant Medications    Vibegron (Gemtesa) 75 MG tablet    Other Relevant Orders    POC Urinalysis Dipstick, Automated (Completed)    Case Request (Completed)    Frequency of micturition    Overview     Added automatically from request for surgery [id number]         Relevant Medications    Vibegron (Gemtesa) 75 MG tablet    Other Relevant Orders    Urine Culture - Urine, Urine, Random Void (Completed)    Case Request (Completed)    Incomplete emptying of bladder    Overview     Added automatically from request for surgery [id number]         Relevant Orders    Bladder Scan (Completed)    Case Request (Completed)    Interstitial cystitis (chronic) with hematuria    Overview     Added automatically from request for surgery [id number]         Relevant Medications    Vibegron (Gemtesa) 75 MG tablet    Other Relevant Orders    Case Request (Completed)                                                  ASSESSMENT  ACUTE CYSTITIS WITH HEMATURIA/DETRUSOR INSTABILITY/FLANK PAIN  Ms. Briseyda Gonzalez  is a pleasant 52-year-old patient, who RETURNS to clinic today for  EVALUATION, with concerns for Acute cystitis with Hematuria, Flank Pain and detrusors  instability.Her Urine dipstick today is negative for any leukocyte estrace, otherwise is completely negative for any infection.  It is negative for gross hematuria.  She has 3+ microscopic hematuria which is chronic for her(states she has always had microscopic hematuria for over 20+ years).     Interstitial Cystitis-we discussed the diagnosis and management of this condition.  I indicated that it was a multifactorial condition with multifactorial symptomatology, Including frequency, urgency, the sensation of urinary tract infection in the absence of a positive culture, sexual symptomatology including significant dyspareunia. We discussed treatment options including the importance of making a clinical diagnosis and the cystoscopic findings including Hunner's ulcers etc.       We also discussed medical management including pharmacologic treatment such as amitriptyline, naturopathic treatments such as pumpkin oil and which more aggressive options of Botox injections as well as the relative risks and merits of this.  We also discussed the use of dietary manipulation including the over-the-counter product relief which basically decreases the acid in the urine and avoidance of ascitic-containing foods such as citrus is etc.                                                  PLAN  THE PATIENT HAS BEEN RESCHEDULED FOR CYSTOSCOPY , WITH BOTOX INJECTIONS TO BLADDER ON 03/13/2023 WITH DR. LEVY.    We resent her urine for culture. I will call her with results if any bacteria growth     Start Gemtesa 50 mg nightly-detrusor instability/severe urinary incontinence/IC      She has been encouraged to  increase her p.o. fluid intake to at least 1 to 2 L daily and avoid bladder irritants such as caffeine products, spicy foods, and citrusy foods.  Stopping all p.o. fluids 2 hours prior to bedtime      I recommend concomitant probiotics with treatment with antibiotics to protect the rectal reservoir including over-the-counter yogurt  preparations to shannan oral pills containing the appropriate probiotics. Patient reports the diligent use of Probiotics.     She is to also continue other medications for atrophic/yeast vaginitis, atrophic vaginitis as prescribed above.    WILL FOLLOW UP IN CLINIC POST PROCEDURE,     SHE MAY RETURN SOONER IF NEED BE     PATIEN IS AGREEABLE WITH PLAN OF CARE     Patient reports that she is not currently experiencing any symptoms of urinary incontinence.    Class 3 Severe Obesity (BMI >=40). Obesity-related health conditions include the following: obstructive sleep apnea, hypertension, coronary heart disease, diabetes mellitus, GERD and urinary stress incontinence. Obesity is improving with lifestyle modifications. BMI is 43.06 kg/M2. We discussed portion control and increasing exercise.      RADIOLOGY (CT AND/OR KUB):    CT Abdomen and Pelvis: Results for orders placed during the hospital encounter of 06/13/17    CT Abdomen Pelvis With & Without Contrast    Narrative  CT ABDOMEN AND PELVIS WITH AND WITHOUT CONTRAST-    CLINICAL INDICATION: Gross hematuria; N39.0-Urinary tract infection,  site not specified; R31.0-Gross hematuria.      DOSE: 3182.06 mGy.cm  COMPARISON: 12/28/2016.    Radiation dose reduction techniques were utilized per ALARA protocol.  Automated exposure control was initiated through either or [employer] or  [employer] software packages by  protocol.      PROCEDURE: Axial images were acquired from the lung bases through the  pubic symphysis before and after IV contrast. No oral contrast was  administered.    FINDINGS: Lung bases show no pleural effusions.    There is a moderate sized hiatal hernia.    The liver is homogeneous but slightly decreased in attenuation  compatible with fatty infiltration of the liver.    Spleen is homogeneous.    There is no adrenal enlargement.    Kidneys show no hydronephrosis or hydroureter. No evidence of a solid  renal mass.    Cysts are seen on the ovaries  bilaterally. The uterus is heterogeneous.    The bladder is somewhat decompressed.    Impression  Heterogeneity of the uterus.  Bilateral adnexal cysts.  Other findings as above.    This report was finalized on 6/13/2017 9:49 AM by Dr. Hossein Shine MD.     CT Stone Protocol: Results for orders placed during the hospital encounter of 08/16/19    CT Abdomen Pelvis Stone Protocol    Narrative  CT ABDOMEN PELVIS STONE PROTOCOL-    REASON FOR EXAM: Hematuria; R31.29-Other microscopic hematuria    COMPARISON: Today's CT is compared with an earlier CT scan done in  06/2017.    FINDINGS: Spiral scans were obtained through the kidneys, ureters, and  bladder. The kidneys are normal in size and shape. There was no  hydronephrosis. There were no stones in the intrarenal collecting  systems. There were no stones noted along the course of the ureters.  Urinary bladder is smooth in contour.    Impression  No evidence of stone or obstruction was seen involving the  collecting system of either kidney.    846.43 mGy.cm  The radiation dose reduction device was utilized for each scan per the  ALARA (as low as reasonably achievable) protocol.    This report was finalized on 8/16/2019 2:07 PM by Dr. Chapo Rosales II, MD.     KUB: No results found for this or any previous visit.       LABS (3 MONTHS):    Office Visit on 03/01/2023   Component Date Value Ref Range Status   • Color 03/01/2023 Yellow  Yellow, Straw, Dark Yellow, Jennifer Final   • Clarity, UA 03/01/2023 Clear  Clear Final   • Specific Gravity  03/01/2023 1.015  1.005 - 1.030 Final   • pH, Urine 03/01/2023 5.5  5.0 - 8.0 Final   • Leukocytes 03/01/2023 Negative  Negative Final   • Nitrite, UA 03/01/2023 Negative  Negative Final   • Protein, POC 03/01/2023 Trace (A)  Negative mg/dL Final   • Glucose, UA 03/01/2023 Negative  Negative mg/dL Final   • Ketones, UA 03/01/2023 Negative  Negative Final   • Urobilinogen, UA 03/01/2023 Normal  Normal, 0.2 E.U./dL Final   • Bilirubin  03/01/2023 Negative  Negative Final   • Blood, UA 03/01/2023 3+ (A)  Negative Final   • Lot Number 03/01/2023 [id number]   Final   • Expiration Date 03/01/2023 2/8/24   Final   • Urine Culture 03/01/2023 25,000 CFU/mL Mixed Belkis Isolated   Final   Admission on 01/19/2023, Discharged on 01/20/2023   Component Date Value Ref Range Status   • QT Interval 01/19/2023 390  ms Final   • QTC Interval 01/19/2023 449  ms Final   • Glucose 01/19/2023 100 (H)  65 - 99 mg/dL Final   • BUN 01/19/2023 13  6 - 20 mg/dL Final   • Creatinine 01/19/2023 0.99  0.57 - 1.00 mg/dL Final   • Sodium 01/19/2023 138  136 - 145 mmol/L Final   • Potassium 01/19/2023 4.2  3.5 - 5.2 mmol/L Final    Slight hemolysis detected by analyzer. Results may be affected.   • Chloride 01/19/2023 105  98 - 107 mmol/L Final   • CO2 01/19/2023 23.4  22.0 - 29.0 mmol/L Final   • Calcium 01/19/2023 9.2  8.6 - 10.5 mg/dL Final   • Total Protein 01/19/2023 6.9  6.0 - 8.5 g/dL Final   • Albumin 01/19/2023 4.0  3.5 - 5.2 g/dL Final   • ALT (SGPT) 01/19/2023 19  1 - 33 U/L Final   • AST (SGOT) 01/19/2023 45 (H)  1 - 32 U/L Final   • Alkaline Phosphatase 01/19/2023 90  39 - 117 U/L Final   • Total Bilirubin 01/19/2023 0.2  0.0 - 1.2 mg/dL Final   • Globulin 01/19/2023 2.9  gm/dL Final   • A/G Ratio 01/19/2023 1.4  g/dL Final   • BUN/Creatinine Ratio 01/19/2023 13.1  7.0 - 25.0 Final   • Anion Gap 01/19/2023 9.6  5.0 - 15.0 mmol/L Final   • eGFR 01/19/2023 69.6  >60.0 mL/min/1.73 Final   • Troponin T 01/19/2023 <0.010  0.000 - 0.030 ng/mL Final   • Troponin T 01/19/2023 <0.010  0.000 - 0.030 ng/mL Final   • Extra Tube 01/19/2023 Hold for add-ons.   Final    Auto resulted.   • Extra Tube 01/19/2023 hold for add-on   Final    Auto resulted   • Extra Tube 01/19/2023 Hold for add-ons.   Final    Auto resulted.   • Extra Tube 01/19/2023 Hold for add-ons.   Final    Auto resulted   • WBC 01/19/2023 9.63  3.40 - 10.80 10*3/mm3 Final   • RBC 01/19/2023 4.45  3.77 - 5.28  10*6/mm3 Final   • Hemoglobin 01/19/2023 13.4  12.0 - 15.9 g/dL Final   • Hematocrit 01/19/2023 40.6  34.0 - 46.6 % Final   • MCV 01/19/2023 91.2  79.0 - 97.0 fL Final   • MCH 01/19/2023 30.1  26.6 - 33.0 pg Final   • MCHC 01/19/2023 33.0  31.5 - 35.7 g/dL Final   • RDW 01/19/2023 13.0  12.3 - 15.4 % Final   • RDW-SD 01/19/2023 43.1  37.0 - 54.0 fl Final   • MPV 01/19/2023 9.8  6.0 - 12.0 fL Final   • Platelets 01/19/2023 295  140 - 450 10*3/mm3 Final   • Neutrophil % 01/19/2023 51.3  42.7 - 76.0 % Final   • Lymphocyte % 01/19/2023 34.8  19.6 - 45.3 % Final   • Monocyte % 01/19/2023 7.8  5.0 - 12.0 % Final   • Eosinophil % 01/19/2023 4.7  0.3 - 6.2 % Final   • Basophil % 01/19/2023 0.8  0.0 - 1.5 % Final   • Immature Grans % 01/19/2023 0.6 (H)  0.0 - 0.5 % Final   • Neutrophils, Absolute 01/19/2023 4.94  1.70 - 7.00 10*3/mm3 Final   • Lymphocytes, Absolute 01/19/2023 3.35 (H)  0.70 - 3.10 10*3/mm3 Final   • Monocytes, Absolute 01/19/2023 0.75  0.10 - 0.90 10*3/mm3 Final   • Eosinophils, Absolute 01/19/2023 0.45 (H)  0.00 - 0.40 10*3/mm3 Final   • Basophils, Absolute 01/19/2023 0.08  0.00 - 0.20 10*3/mm3 Final   • Immature Grans, Absolute 01/19/2023 0.06 (H)  0.00 - 0.05 10*3/mm3 Final   • nRBC 01/19/2023 0.0  0.0 - 0.2 /100 WBC Final   • proBNP 01/19/2023 91.5  0.0 - 900.0 pg/mL Final   • TSH 01/19/2023 3.840  0.270 - 4.200 uIU/mL Final   • Free T4 01/19/2023 0.95  0.93 - 1.70 ng/dL Final   • Hemoglobin A1C 01/19/2023 5.50  4.80 - 5.60 % Final   • COVID19 01/20/2023 Not Detected  Not Detected - Ref. Range Final   • Influenza A PCR 01/20/2023 Not Detected  Not Detected Final   • Influenza B PCR 01/20/2023 Not Detected  Not Detected Final   • Target HR (85%) 01/20/2023 145  bpm Final   • Max. Pred. HR (100%) 01/20/2023 170  bpm Final   • Nuclear Prior Study 01/20/2023 3.0   Final   • Exercise duration (min) 01/20/2023 4  min Final   • Exercise duration (sec) 01/20/2023 5  sec Final   • Estimated workload 01/20/2023  7.2  METS Final   • BH CV REST NUCLEAR ISOTOPE DOSE 01/20/2023 10.7  mCi Final   • BH CV STRESS NUCLEAR ISOTOPE DOSE 01/20/2023 31.3  mCi Final   • BH CV STRESS PROTOCOL 1 01/20/2023 Ald[place]   Final   • Stage 1 01/20/2023 1   Final   • HR Stage 1 01/20/2023 133   Final   • BP Stage 1 01/20/2023 126/48   Final   • Duration Min Stage 1 01/20/2023 3   Final   • Duration Sec Stage 1 01/20/2023 0   Final   • Grade Stage 1 01/20/2023 10   Final   • Speed Stage 1 01/20/2023 1.7   Final   • BH CV STRESS METS STAGE 1 01/20/2023 5   Final   • Stage 2 01/20/2023 2   Final   • HR Stage 2 01/20/2023 153   Final   • BP Stage 2 01/20/2023 148/53   Final   • Duration Min Stage 2 01/20/2023 1   Final   • Duration Sec Stage 2 01/20/2023 5   Final   • Grade Stage 2 01/20/2023 12   Final   • Speed Stage 2 01/20/2023 2.5   Final   • BH CV STRESS METS STAGE 2 01/20/2023 7.5   Final   • Baseline HR 01/20/2023 71  bpm Final   • Baseline BP 01/20/2023 119/55  mmHg Final   • Peak HR 01/20/2023 153  bpm Final   • Percent Max Pred HR 01/20/2023 90.00  % Final   • Percent Target HR 01/20/2023 106  % Final   • Peak BP 01/20/2023 148/53  mmHg Final   • Recovery HR 01/20/2023 97  bpm Final   • Recovery BP 01/20/2023 127/57  mmHg Final   • Nuc Stress EF 01/20/2023 63  % Final      1. Urinary incontinence.  2. Interstitial cystitis.    Plan:  - The patient's urine will be sent for a culture. I will call the patient with results if any positive bacteria growth.  - Start Gemtesa 75 mg nightly.  - Start oxybutynin 5 mg nightly.  - The patient will follow up in clinic post procedure. She may return sooner if her symptoms worsen.      Smoking Cessation Counseling:  Current every day smoker. less than 3 minutes spent counseling. Not agreeable to stopping.  I advised patient to quit tobacco use and offered support.  I provided patient with tobacco cessation educational material printed in the patient's After Visit Summary.     Follow Up   Return in about 12  days (around 3/13/2023) for Next scheduled follow up,With Dr. Wayne, Cystoscopy IM OR WITH BLADDER BOTOC FOR IC/OAB/DETRUSOR I.    Patient was given instructions and counseling regarding her condition or for health maintenance advice. Please see specific information pulled into the AVS if appropriate.          This document has been electronically signed by Griselda Cheng-Akwa, APRN   March 2, 2023 21:59 EST      Dictated Utilizing Dragon Dictation: Part of this note may be an electronic transcription/translation of spoken language to printed text using the Dragon Dictation System.     Transcribed from ambient dictation for Griselda Cheng-Akwa, APRN by Joanna Fletcher.  03/01/23   16:28 EST    Patient or patient representative verbalized consent to the visit recording.  I have personally performed the services described in this document as transcribed by the above individual, and it is both accurate and complete.  Griselda Cheng-Akwa, APRN  3/2/2023  21:54 EST

## 2023-03-02 ENCOUNTER — TELEPHONE (OUTPATIENT)
Dept: NEUROSURGERY | Facility: CLINIC | Age: 53
End: 2023-03-02
Payer: COMMERCIAL

## 2023-03-02 LAB — BACTERIA SPEC AEROBE CULT: NORMAL

## 2023-03-02 RX ORDER — VIBEGRON 75 MG/1
1 TABLET, FILM COATED ORAL NIGHTLY
Qty: 30 TABLET | Refills: 3 | Status: SHIPPED | OUTPATIENT
Start: 2023-03-02 | End: 2023-04-01

## 2023-03-02 NOTE — TELEPHONE ENCOUNTER
Caller: Briseyda Edouard    Relationship to patient: Self    Best call back number: [phone]    Chief complaint: NEEDS MRI AFTER PT    Type of visit: FOLLOW UP (TELEPHONE VISIT)    Requested date: 3/9/23     If rescheduling, when is the original appointment: 3/9/23     Additional notes: PATIENT STATES BECAUSE THIS APPT IS TO HAVE MRI REORDERED AFTER SHE HAS COMPLETED PT, SHE WOULD LIKE TO HAVE IT COMPLETED VIA TELEPHONE IF POSSIBLE. PLEASE CALL PATIENT TO ADVISE.

## 2023-03-07 NOTE — TELEPHONE ENCOUNTER
03/09/2023 appointment has been updated to a telephone visit per patient's request. Closing encounter.

## 2023-03-09 ENCOUNTER — TELEPHONE (OUTPATIENT)
Dept: NEUROSURGERY | Facility: CLINIC | Age: 53
End: 2023-03-09
Payer: COMMERCIAL

## 2023-03-09 NOTE — TELEPHONE ENCOUNTER
Left message, attempted to reach patient for televisit.     Needed to verify why she did not complete required scans needed for her fu appt with nargis.

## 2023-03-13 ENCOUNTER — APPOINTMENT (OUTPATIENT)
Dept: GENERAL RADIOLOGY | Facility: HOSPITAL | Age: 53
End: 2023-03-13
Payer: COMMERCIAL

## 2023-03-13 ENCOUNTER — ANESTHESIA (OUTPATIENT)
Dept: PERIOP | Facility: HOSPITAL | Age: 53
End: 2023-03-13
Payer: COMMERCIAL

## 2023-03-13 ENCOUNTER — ANESTHESIA EVENT (OUTPATIENT)
Dept: PERIOP | Facility: HOSPITAL | Age: 53
End: 2023-03-13
Payer: COMMERCIAL

## 2023-03-13 ENCOUNTER — HOSPITAL ENCOUNTER (OUTPATIENT)
Facility: HOSPITAL | Age: 53
Setting detail: HOSPITAL OUTPATIENT SURGERY
Discharge: HOME | End: 2023-03-13
Attending: UROLOGY | Admitting: UROLOGY
Payer: COMMERCIAL

## 2023-03-13 VITALS
RESPIRATION RATE: 16 BRPM | SYSTOLIC BLOOD PRESSURE: 109 MMHG | OXYGEN SATURATION: 96 % | BODY MASS INDEX: 42.22 KG/M2 | WEIGHT: 269 LBS | TEMPERATURE: 97.8 F | HEIGHT: 67 IN | DIASTOLIC BLOOD PRESSURE: 62 MMHG | HEART RATE: 64 BPM

## 2023-03-13 DIAGNOSIS — R35.0 URINE FREQUENCY: ICD-10-CM

## 2023-03-13 DIAGNOSIS — N30.11 INTERSTITIAL CYSTITIS (CHRONIC) WITH HEMATURIA: ICD-10-CM

## 2023-03-13 DIAGNOSIS — R33.9 INCOMPLETE EMPTYING OF BLADDER: ICD-10-CM

## 2023-03-13 DIAGNOSIS — N30.01 ACUTE CYSTITIS WITH HEMATURIA: ICD-10-CM

## 2023-03-13 DIAGNOSIS — R35.0 FREQUENCY OF MICTURITION: ICD-10-CM

## 2023-03-13 DIAGNOSIS — N32.81 DETRUSOR INSTABILITY: ICD-10-CM

## 2023-03-13 LAB — GLUCOSE BLDC GLUCOMTR-MCNC: 118 MG/DL (ref 70–130)

## 2023-03-13 PROCEDURE — 25010000002 GENTAMICIN PER 80 MG: Performed by: NURSE PRACTITIONER

## 2023-03-13 PROCEDURE — 52287 CYSTOSCOPY CHEMODENERVATION: CPT | Performed by: UROLOGY

## 2023-03-13 PROCEDURE — 25010000002 ONABOTULINUMTOXINA 100 UNITS RECONSTITUTED SOLUTION: Performed by: UROLOGY

## 2023-03-13 PROCEDURE — 25010000002 FENTANYL CITRATE (PF) 50 MCG/ML SOLUTION: Performed by: NURSE ANESTHETIST, CERTIFIED REGISTERED

## 2023-03-13 PROCEDURE — 82962 GLUCOSE BLOOD TEST: CPT

## 2023-03-13 PROCEDURE — 25010000002 MIDAZOLAM PER 1 MG: Performed by: NURSE ANESTHETIST, CERTIFIED REGISTERED

## 2023-03-13 PROCEDURE — 25010000002 PROPOFOL 200 MG/20ML EMULSION: Performed by: NURSE ANESTHETIST, CERTIFIED REGISTERED

## 2023-03-13 PROCEDURE — 25010000002 ONDANSETRON PER 1 MG: Performed by: NURSE ANESTHETIST, CERTIFIED REGISTERED

## 2023-03-13 RX ORDER — HYDROCODONE BITARTRATE AND ACETAMINOPHEN 10; 325 MG/1; MG/1
1 TABLET ORAL EVERY 6 HOURS PRN
Qty: 10 TABLET | Refills: 0 | Status: SHIPPED | OUTPATIENT
Start: 2023-03-13

## 2023-03-13 RX ORDER — PROPOFOL 10 MG/ML
INJECTION, EMULSION INTRAVENOUS CONTINUOUS PRN
Status: DISCONTINUED | OUTPATIENT
Start: 2023-03-13 | End: 2023-03-13 | Stop reason: SURG

## 2023-03-13 RX ORDER — FENTANYL CITRATE 50 UG/ML
50 INJECTION, SOLUTION INTRAMUSCULAR; INTRAVENOUS
Status: DISCONTINUED | OUTPATIENT
Start: 2023-03-13 | End: 2023-03-13 | Stop reason: HOSPADM

## 2023-03-13 RX ORDER — LIDOCAINE HYDROCHLORIDE 20 MG/ML
JELLY TOPICAL AS NEEDED
Status: DISCONTINUED | OUTPATIENT
Start: 2023-03-13 | End: 2023-03-13 | Stop reason: HOSPADM

## 2023-03-13 RX ORDER — FAMOTIDINE 10 MG/ML
INJECTION, SOLUTION INTRAVENOUS AS NEEDED
Status: DISCONTINUED | OUTPATIENT
Start: 2023-03-13 | End: 2023-03-13 | Stop reason: SURG

## 2023-03-13 RX ORDER — GENTAMICIN SULFATE 80 MG/100ML
80 INJECTION, SOLUTION INTRAVENOUS ONCE
Status: COMPLETED | OUTPATIENT
Start: 2023-03-13 | End: 2023-03-13

## 2023-03-13 RX ORDER — SODIUM CHLORIDE, SODIUM LACTATE, POTASSIUM CHLORIDE, CALCIUM CHLORIDE 600; 310; 30; 20 MG/100ML; MG/100ML; MG/100ML; MG/100ML
100 INJECTION, SOLUTION INTRAVENOUS ONCE AS NEEDED
Status: DISCONTINUED | OUTPATIENT
Start: 2023-03-13 | End: 2023-03-13 | Stop reason: HOSPADM

## 2023-03-13 RX ORDER — MIDAZOLAM HYDROCHLORIDE 1 MG/ML
1 INJECTION INTRAMUSCULAR; INTRAVENOUS
Status: DISCONTINUED | OUTPATIENT
Start: 2023-03-13 | End: 2023-03-13 | Stop reason: HOSPADM

## 2023-03-13 RX ORDER — ONDANSETRON 2 MG/ML
4 INJECTION INTRAMUSCULAR; INTRAVENOUS AS NEEDED
Status: DISCONTINUED | OUTPATIENT
Start: 2023-03-13 | End: 2023-03-13 | Stop reason: HOSPADM

## 2023-03-13 RX ORDER — SODIUM CHLORIDE, SODIUM LACTATE, POTASSIUM CHLORIDE, CALCIUM CHLORIDE 600; 310; 30; 20 MG/100ML; MG/100ML; MG/100ML; MG/100ML
125 INJECTION, SOLUTION INTRAVENOUS ONCE
Status: COMPLETED | OUTPATIENT
Start: 2023-03-13 | End: 2023-03-13

## 2023-03-13 RX ORDER — MIDAZOLAM HYDROCHLORIDE 1 MG/ML
INJECTION INTRAMUSCULAR; INTRAVENOUS AS NEEDED
Status: DISCONTINUED | OUTPATIENT
Start: 2023-03-13 | End: 2023-03-13 | Stop reason: SURG

## 2023-03-13 RX ORDER — MAGNESIUM HYDROXIDE 1200 MG/15ML
LIQUID ORAL AS NEEDED
Status: DISCONTINUED | OUTPATIENT
Start: 2023-03-13 | End: 2023-03-13 | Stop reason: HOSPADM

## 2023-03-13 RX ORDER — ONDANSETRON 2 MG/ML
INJECTION INTRAMUSCULAR; INTRAVENOUS AS NEEDED
Status: DISCONTINUED | OUTPATIENT
Start: 2023-03-13 | End: 2023-03-13 | Stop reason: SURG

## 2023-03-13 RX ORDER — SODIUM CHLORIDE 0.9 % (FLUSH) 0.9 %
10 SYRINGE (ML) INJECTION EVERY 12 HOURS SCHEDULED
Status: DISCONTINUED | OUTPATIENT
Start: 2023-03-13 | End: 2023-03-13 | Stop reason: HOSPADM

## 2023-03-13 RX ORDER — FENTANYL CITRATE 50 UG/ML
INJECTION, SOLUTION INTRAMUSCULAR; INTRAVENOUS AS NEEDED
Status: DISCONTINUED | OUTPATIENT
Start: 2023-03-13 | End: 2023-03-13 | Stop reason: SURG

## 2023-03-13 RX ORDER — SODIUM CHLORIDE 0.9 % (FLUSH) 0.9 %
10 SYRINGE (ML) INJECTION AS NEEDED
Status: DISCONTINUED | OUTPATIENT
Start: 2023-03-13 | End: 2023-03-13 | Stop reason: HOSPADM

## 2023-03-13 RX ORDER — IPRATROPIUM BROMIDE AND ALBUTEROL SULFATE 2.5; .5 MG/3ML; MG/3ML
3 SOLUTION RESPIRATORY (INHALATION) ONCE AS NEEDED
Status: DISCONTINUED | OUTPATIENT
Start: 2023-03-13 | End: 2023-03-13 | Stop reason: HOSPADM

## 2023-03-13 RX ORDER — MEPERIDINE HYDROCHLORIDE 25 MG/ML
12.5 INJECTION INTRAMUSCULAR; INTRAVENOUS; SUBCUTANEOUS
Status: DISCONTINUED | OUTPATIENT
Start: 2023-03-13 | End: 2023-03-13 | Stop reason: HOSPADM

## 2023-03-13 RX ORDER — SODIUM CHLORIDE 9 MG/ML
40 INJECTION, SOLUTION INTRAVENOUS AS NEEDED
Status: DISCONTINUED | OUTPATIENT
Start: 2023-03-13 | End: 2023-03-13 | Stop reason: HOSPADM

## 2023-03-13 RX ORDER — LIDOCAINE HYDROCHLORIDE 20 MG/ML
INJECTION, SOLUTION EPIDURAL; INFILTRATION; INTRACAUDAL; PERINEURAL AS NEEDED
Status: DISCONTINUED | OUTPATIENT
Start: 2023-03-13 | End: 2023-03-13 | Stop reason: SURG

## 2023-03-13 RX ORDER — OXYCODONE HYDROCHLORIDE AND ACETAMINOPHEN 5; 325 MG/1; MG/1
1 TABLET ORAL ONCE AS NEEDED
Status: DISCONTINUED | OUTPATIENT
Start: 2023-03-13 | End: 2023-03-13 | Stop reason: HOSPADM

## 2023-03-13 RX ORDER — KETOROLAC TROMETHAMINE 30 MG/ML
30 INJECTION, SOLUTION INTRAMUSCULAR; INTRAVENOUS EVERY 6 HOURS PRN
Status: DISCONTINUED | OUTPATIENT
Start: 2023-03-13 | End: 2023-03-13 | Stop reason: HOSPADM

## 2023-03-13 RX ADMIN — FENTANYL CITRATE 100 MCG: 50 INJECTION INTRAMUSCULAR; INTRAVENOUS at 10:41

## 2023-03-13 RX ADMIN — FAMOTIDINE 20 MG: 10 INJECTION, SOLUTION INTRAVENOUS at 10:41

## 2023-03-13 RX ADMIN — PROPOFOL 150 MCG/KG/MIN: 10 INJECTION, EMULSION INTRAVENOUS at 10:42

## 2023-03-13 RX ADMIN — LIDOCAINE HYDROCHLORIDE 60 MG: 20 INJECTION, SOLUTION EPIDURAL; INFILTRATION; INTRACAUDAL; PERINEURAL at 10:41

## 2023-03-13 RX ADMIN — GENTAMICIN SULFATE 80 MG: 80 INJECTION, SOLUTION INTRAVENOUS at 10:41

## 2023-03-13 RX ADMIN — SODIUM CHLORIDE, POTASSIUM CHLORIDE, SODIUM LACTATE AND CALCIUM CHLORIDE: 600; 310; 30; 20 INJECTION, SOLUTION INTRAVENOUS at 10:41

## 2023-03-13 RX ADMIN — ONDANSETRON 4 MG: 2 INJECTION INTRAMUSCULAR; INTRAVENOUS at 10:41

## 2023-03-13 RX ADMIN — MIDAZOLAM HYDROCHLORIDE 2 MG: 1 INJECTION, SOLUTION INTRAMUSCULAR; INTRAVENOUS at 10:41

## 2023-03-13 NOTE — OP NOTE
CYSTOSCOPY BOTOX INJECTION OF BLADDER  Procedure Note    Briseyda Edouard  3/13/2023    Pre-op Diagnosis:   Urine frequency [R35.0]  Frequency of micturition [R35.0]  Incomplete emptying of bladder [R33.9]  Acute cystitis with hematuria [N30.01]  Detrusor instability [N32.81]  Interstitial cystitis (chronic) with hematuria [N30.11]    Post-op Diagnosis:     Post-Op Diagnosis Codes:     * Urine frequency [R35.0]     * Frequency of micturition [R35.0]     * Incomplete emptying of bladder [R33.9]     * Acute cystitis with hematuria [N30.01]     * Detrusor instability [N32.81]     * Interstitial cystitis (chronic) with hematuria [N30.11]    Procedure/CPT® Codes:  52-year-old white female here for her second Botox injection it worked very well previously.  Botox-patient wishes to proceed with a Botox injection in the bladder.  We discussed the efficacy of this treatment.  We discussed the effects on voiding.  I explained that there is an approximate 6% risk of retention with 100 units of Botox injected in the posterior bladder in a submucosal fashion.  We talked about the need for intermittent clean catheterization if urinary retention were to follow.  We discussed the fact that it lasts up to 6 months.  Then we discussed the increasing risk of retention with higher doses.  We discussed the efficacy of this treatment and neurogenic bladders, particularly detrusor hyperreflexia and reviewed the appropriate literature in this regard.  Patient wishes to proceed.  Following an informed consent brought the procedure suite placed in the low dorsolithotomy position she had a normal cystoscopic anatomy and injected a total of 10 sites with 200 units of Botox dissolved in 10 cc of saline this was accomplished without difficulty    Procedure(s):  Cystoscopy Botox injection of bladder-200 units    Surgeon(s):  Steven Wayne MD    Anesthesia: see anesthesia record    Staff:   Circulator: Shahnaz Diamond RN; Josefina Carrero  JEIMY Mcpherson  Scrub Person: Lexi Kilgore LPN; Elaina Villegas    Estimated Blood Loss: none  Urine Voided: * No values recorded between 3/13/2023 10:41 AM and 3/13/2023 11:01 AM *    Specimens:                None      Drains: None    Findings: Normal cystoscopic anatomy    Blood: N/A    Complications: None    Grafts and Implants: None    Steven Wayne MD     Date: 3/13/2023  Time: 11:01 EDT

## 2023-03-13 NOTE — ANESTHESIA POSTPROCEDURE EVALUATION
Patient: Briseyda Edouard    Procedure Summary     Date: 03/13/23 Room / Location: [facility] OR 06 /  [facility] OR    Anesthesia Start: 1041 Anesthesia Stop: 1101    Procedure: Cystoscopy Botox injection of bladder Diagnosis:       Urine frequency      Frequency of micturition      Incomplete emptying of bladder      Acute cystitis with hematuria      Detrusor instability      Interstitial cystitis (chronic) with hematuria      (Urine frequency [R35.0])      (Frequency of micturition [R35.0])      (Incomplete emptying of bladder [R33.9])      (Acute cystitis with hematuria [N30.01])      (Detrusor instability [N32.81])      (Interstitial cystitis (chronic) with hematuria [N30.11])    Surgeons: Steven Wayne MD Provider: Bud Pringle MD    Anesthesia Type: general ASA Status: 3          Anesthesia Type: general    Vitals  No vitals data found for the desired time range.          Post Anesthesia Care and Evaluation    Patient location during evaluation: PACU  Patient participation: complete - patient participated  Level of consciousness: awake  Pain score: 1  Pain management: adequate    Airway patency: patent  Anesthetic complications: No anesthetic complications  PONV Status: none  Cardiovascular status: acceptable  Respiratory status: acceptable  Hydration status: acceptable

## 2023-03-13 NOTE — ANESTHESIA PREPROCEDURE EVALUATION
Anesthesia Evaluation     Patient summary reviewed and Nursing notes reviewed   history of anesthetic complications: PONV  NPO Solid Status: > 8 hours  NPO Liquid Status: > 8 hours           Airway   Mallampati: II  TM distance: >3 FB  Neck ROM: full  No difficulty expected  Dental - normal exam     Pulmonary - normal exam   (+) pneumonia (4-5 years ago ) resolved , a smoker Current Abstained day of surgery,   Cardiovascular - normal exam  Exercise tolerance: good (4-7 METS)    (+) hypertension,       Neuro/Psych  (+) psychiatric history Anxiety and Depression,    GI/Hepatic/Renal/Endo    (+) obesity,  hiatal hernia, GERD,  renal disease stones, diabetes mellitus,     Musculoskeletal     Abdominal    Substance History - negative use     OB/GYN    (-)  Pregnant    Comment: Urine pregnancy negative       Other   arthritis,    history of cancer (melanoma ) remission                      Anesthesia Plan    ASA 3     general     intravenous induction     Anesthetic plan, risks, benefits, and alternatives have been provided, discussed and informed consent has been obtained with: patient.

## 2023-07-18 ENCOUNTER — APPOINTMENT (OUTPATIENT)
Dept: CT IMAGING | Facility: HOSPITAL | Age: 53
End: 2023-07-18
Payer: COMMERCIAL

## 2023-07-18 ENCOUNTER — HOSPITAL ENCOUNTER (EMERGENCY)
Facility: HOSPITAL | Age: 53
Discharge: HOME | End: 2023-07-18
Attending: STUDENT IN AN ORGANIZED HEALTH CARE EDUCATION/TRAINING PROGRAM | Admitting: STUDENT IN AN ORGANIZED HEALTH CARE EDUCATION/TRAINING PROGRAM
Payer: COMMERCIAL

## 2023-07-18 VITALS
TEMPERATURE: 97.5 F | DIASTOLIC BLOOD PRESSURE: 72 MMHG | WEIGHT: 265 LBS | HEIGHT: 67 IN | BODY MASS INDEX: 41.59 KG/M2 | RESPIRATION RATE: 16 BRPM | OXYGEN SATURATION: 97 % | HEART RATE: 70 BPM | SYSTOLIC BLOOD PRESSURE: 102 MMHG

## 2023-07-18 DIAGNOSIS — R11.2 NAUSEA AND VOMITING, UNSPECIFIED VOMITING TYPE: Primary | ICD-10-CM

## 2023-07-18 DIAGNOSIS — K52.9 ENTERITIS: ICD-10-CM

## 2023-07-18 LAB
ALBUMIN SERPL-MCNC: 3.9 G/DL (ref 3.5–5.2)
ALBUMIN/GLOB SERPL: 1.4 G/DL
ALP SERPL-CCNC: 100 U/L (ref 39–117)
ALT SERPL W P-5'-P-CCNC: 24 U/L (ref 1–33)
ANION GAP SERPL CALCULATED.3IONS-SCNC: 10.2 MMOL/L (ref 5–15)
AST SERPL-CCNC: 59 U/L (ref 1–32)
BACTERIA UR QL AUTO: ABNORMAL /HPF
BASOPHILS # BLD AUTO: 0.05 10*3/MM3 (ref 0–0.2)
BASOPHILS NFR BLD AUTO: 0.5 % (ref 0–1.5)
BILIRUB SERPL-MCNC: 0.6 MG/DL (ref 0–1.2)
BILIRUB UR QL STRIP: NEGATIVE
BUN SERPL-MCNC: 15 MG/DL (ref 6–20)
BUN/CREAT SERPL: 12 (ref 7–25)
CALCIUM SPEC-SCNC: 9.2 MG/DL (ref 8.6–10.5)
CHLORIDE SERPL-SCNC: 109 MMOL/L (ref 98–107)
CLARITY UR: ABNORMAL
CO2 SERPL-SCNC: 22.8 MMOL/L (ref 22–29)
COLOR UR: YELLOW
CREAT SERPL-MCNC: 1.25 MG/DL (ref 0.57–1)
D-LACTATE SERPL-SCNC: 2 MMOL/L (ref 0.5–2)
DEPRECATED RDW RBC AUTO: 44 FL (ref 37–54)
EGFRCR SERPLBLD CKD-EPI 2021: 52.6 ML/MIN/1.73
EOSINOPHIL # BLD AUTO: 0.12 10*3/MM3 (ref 0–0.4)
EOSINOPHIL NFR BLD AUTO: 1.2 % (ref 0.3–6.2)
ERYTHROCYTE [DISTWIDTH] IN BLOOD BY AUTOMATED COUNT: 12.9 % (ref 12.3–15.4)
GLOBULIN UR ELPH-MCNC: 2.7 GM/DL
GLUCOSE SERPL-MCNC: 119 MG/DL (ref 65–99)
GLUCOSE UR STRIP-MCNC: NEGATIVE MG/DL
HCT VFR BLD AUTO: 42.7 % (ref 34–46.6)
HGB BLD-MCNC: 13.5 G/DL (ref 12–15.9)
HGB UR QL STRIP.AUTO: ABNORMAL
HOLD SPECIMEN: NORMAL
HOLD SPECIMEN: NORMAL
HYALINE CASTS UR QL AUTO: ABNORMAL /LPF
IMM GRANULOCYTES # BLD AUTO: 0.04 10*3/MM3 (ref 0–0.05)
IMM GRANULOCYTES NFR BLD AUTO: 0.4 % (ref 0–0.5)
KETONES UR QL STRIP: ABNORMAL
LEUKOCYTE ESTERASE UR QL STRIP.AUTO: ABNORMAL
LYMPHOCYTES # BLD AUTO: 3.15 10*3/MM3 (ref 0.7–3.1)
LYMPHOCYTES NFR BLD AUTO: 32 % (ref 19.6–45.3)
MCH RBC QN AUTO: 29.5 PG (ref 26.6–33)
MCHC RBC AUTO-ENTMCNC: 31.6 G/DL (ref 31.5–35.7)
MCV RBC AUTO: 93.4 FL (ref 79–97)
MONOCYTES # BLD AUTO: 0.63 10*3/MM3 (ref 0.1–0.9)
MONOCYTES NFR BLD AUTO: 6.4 % (ref 5–12)
NEUTROPHILS NFR BLD AUTO: 5.84 10*3/MM3 (ref 1.7–7)
NEUTROPHILS NFR BLD AUTO: 59.5 % (ref 42.7–76)
NITRITE UR QL STRIP: NEGATIVE
NRBC BLD AUTO-RTO: 0 /100 WBC (ref 0–0.2)
PH UR STRIP.AUTO: 5.5 [PH] (ref 5–8)
PLATELET # BLD AUTO: 319 10*3/MM3 (ref 140–450)
PMV BLD AUTO: 10.3 FL (ref 6–12)
POTASSIUM SERPL-SCNC: 4.3 MMOL/L (ref 3.5–5.2)
PROT SERPL-MCNC: 6.6 G/DL (ref 6–8.5)
PROT UR QL STRIP: NEGATIVE
RBC # BLD AUTO: 4.57 10*6/MM3 (ref 3.77–5.28)
RBC # UR STRIP: ABNORMAL /HPF
REF LAB TEST METHOD: ABNORMAL
SODIUM SERPL-SCNC: 142 MMOL/L (ref 136–145)
SP GR UR STRIP: 1.02 (ref 1–1.03)
SQUAMOUS #/AREA URNS HPF: ABNORMAL /HPF
UROBILINOGEN UR QL STRIP: ABNORMAL
WBC # UR STRIP: ABNORMAL /HPF
WBC NRBC COR # BLD: 9.83 10*3/MM3 (ref 3.4–10.8)
WHOLE BLOOD HOLD COAG: NORMAL
WHOLE BLOOD HOLD SPECIMEN: NORMAL

## 2023-07-18 PROCEDURE — 83605 ASSAY OF LACTIC ACID: CPT | Performed by: STUDENT IN AN ORGANIZED HEALTH CARE EDUCATION/TRAINING PROGRAM

## 2023-07-18 PROCEDURE — 74177 CT ABD & PELVIS W/CONTRAST: CPT | Performed by: RADIOLOGY

## 2023-07-18 PROCEDURE — 96374 THER/PROPH/DIAG INJ IV PUSH: CPT

## 2023-07-18 PROCEDURE — 74177 CT ABD & PELVIS W/CONTRAST: CPT

## 2023-07-18 PROCEDURE — 85025 COMPLETE CBC W/AUTO DIFF WBC: CPT | Performed by: STUDENT IN AN ORGANIZED HEALTH CARE EDUCATION/TRAINING PROGRAM

## 2023-07-18 PROCEDURE — 99284 EMERGENCY DEPT VISIT MOD MDM: CPT

## 2023-07-18 PROCEDURE — 25510000001 IOPAMIDOL 61 % SOLUTION: Performed by: STUDENT IN AN ORGANIZED HEALTH CARE EDUCATION/TRAINING PROGRAM

## 2023-07-18 PROCEDURE — 81001 URINALYSIS AUTO W/SCOPE: CPT | Performed by: STUDENT IN AN ORGANIZED HEALTH CARE EDUCATION/TRAINING PROGRAM

## 2023-07-18 PROCEDURE — 25010000002 ONDANSETRON PER 1 MG: Performed by: STUDENT IN AN ORGANIZED HEALTH CARE EDUCATION/TRAINING PROGRAM

## 2023-07-18 PROCEDURE — 80053 COMPREHEN METABOLIC PANEL: CPT | Performed by: STUDENT IN AN ORGANIZED HEALTH CARE EDUCATION/TRAINING PROGRAM

## 2023-07-18 RX ORDER — ONDANSETRON 2 MG/ML
4 INJECTION INTRAMUSCULAR; INTRAVENOUS ONCE
Status: COMPLETED | OUTPATIENT
Start: 2023-07-18 | End: 2023-07-18

## 2023-07-18 RX ORDER — ONDANSETRON 4 MG/1
4 TABLET, FILM COATED ORAL EVERY 6 HOURS
Qty: 20 TABLET | Refills: 0 | Status: SHIPPED | OUTPATIENT
Start: 2023-07-18

## 2023-07-18 RX ADMIN — ONDANSETRON 4 MG: 2 INJECTION INTRAMUSCULAR; INTRAVENOUS at 07:37

## 2023-07-18 RX ADMIN — SODIUM CHLORIDE 500 ML: 9 INJECTION, SOLUTION INTRAVENOUS at 07:37

## 2023-07-18 RX ADMIN — IOPAMIDOL 88 ML: 612 INJECTION, SOLUTION INTRAVENOUS at 08:55

## 2023-07-18 NOTE — ED PROVIDER NOTES
Subjective   History of Present Illness  52-year-old female presenting to the ED complaining of crampy abdominal pain with nausea but denies vomiting.  She states that she had recently been treated and evaluated for C. difficile and subsequently developed a UTI which she has completed antibiotics for as of today.  She denies fevers chills or flank pain denies dysuria urgency or frequency at this time.     Review of Systems   Gastrointestinal:  Positive for abdominal pain and nausea.   Genitourinary:  Negative for difficulty urinating, dysuria, flank pain and frequency.     Past Medical History:   Diagnosis Date    Anxiety and depression     Arthritis     Back pain     Body piercing     both ears    Cancer     MELANOMA-right breast, back    Diabetes mellitus     Diverticulitis     GERD (gastroesophageal reflux disease)     H/O bladder infections     Hiatal hernia     History of being tatooed     Hot flashes     Hypertension     Kidney stones     Menstrual changes     Pelvic pain     Pneumonia     PONV (postoperative nausea and vomiting)     Urinary incontinence     Vitamin D deficiency     Wears glasses     for reading       Allergies   Allergen Reactions    Penicillins Anaphylaxis       Past Surgical History:   Procedure Laterality Date    ABDOMINAL SURGERY      BLADDER SURGERY      sling x 2     SECTION      x1    CHOLECYSTECTOMY      COLONOSCOPY      CYSTOSCOPY BOTOX INJECTION OF BLADDER N/A 3/13/2023    Procedure: Cystoscopy Botox injection of bladder;  Surgeon: Stevne Wayne MD;  Location: [facility];  Service: Urology;  Laterality: N/A;    CYSTOSCOPY URETEROSCOPY LASER LITHOTRIPSY Right 2021    Procedure: CYSTOSCOPY URETEROSCOPY RETROGRADE RIGHT, URETEROGRAM;  Surgeon: Steven Wayne MD;  Location: [facility];  Service: Urology;  Laterality: Right;    ENDOSCOPY      INTERSTIM REMOVAL N/A 10/23/2019    Procedure: INTERSTIM REMOVAL;  Surgeon: Jose Adams MD;  Location:   [facility];  Service: Urology    SEPTOPLASTY      SKIN BIOPSY      TONSILLECTOMY AND ADENOIDECTOMY      TUBAL ABDOMINAL LIGATION         Family History   Problem Relation Age of Onset    Diabetes Mother     Arthritis Mother     Cancer Mother         renal cell carconma    Diabetes Father     Hypertension Father     Asthma Son     Breast cancer Neg Hx        Social History     Socioeconomic History    Marital status:    Tobacco Use    Smoking status: Former     Packs/day: 0.50     Years: 30.00     Pack years: 15.00     Types: Cigarettes    Smokeless tobacco: Never   Vaping Use    Vaping Use: Some days    Substances: Nicotine, Flavoring    Devices: Disposable   Substance and Sexual Activity    Alcohol use: No    Drug use: No    Sexual activity: Defer           Objective   Physical Exam  Vitals and nursing note reviewed.   Constitutional:       General: She is not in acute distress.     Appearance: She is well-developed. She is not diaphoretic.   HENT:      Head: Normocephalic and atraumatic.      Right Ear: External ear normal.      Left Ear: External ear normal.      Nose: Nose normal.   Eyes:      Conjunctiva/sclera: Conjunctivae normal.      Pupils: Pupils are equal, round, and reactive to light.   Neck:      Vascular: No JVD.      Trachea: No tracheal deviation.   Cardiovascular:      Rate and Rhythm: Normal rate and regular rhythm.      Heart sounds: Normal heart sounds. No murmur heard.  Pulmonary:      Effort: Pulmonary effort is normal. No respiratory distress.      Breath sounds: Normal breath sounds. No wheezing.   Abdominal:      General: Bowel sounds are normal.      Palpations: Abdomen is soft.      Tenderness: There is no abdominal tenderness.   Musculoskeletal:         General: No deformity. Normal range of motion.      Cervical back: Normal range of motion and neck supple.   Skin:     General: Skin is warm and dry.      Coloration: Skin is not pale.      Findings: No erythema or rash.    Neurological:      Mental Status: She is alert and oriented to person, place, and time.      Cranial Nerves: No cranial nerve deficit.   Psychiatric:         Behavior: Behavior normal.         Thought Content: Thought content normal.       Procedures         Results for orders placed or performed during the hospital encounter of 07/18/23   Comprehensive Metabolic Panel    Specimen: Blood   Result Value Ref Range    Glucose 119 (H) 65 - 99 mg/dL    BUN 15 6 - 20 mg/dL    Creatinine 1.25 (H) 0.57 - 1.00 mg/dL    Sodium 142 136 - 145 mmol/L    Potassium 4.3 3.5 - 5.2 mmol/L    Chloride 109 (H) 98 - 107 mmol/L    CO2 22.8 22.0 - 29.0 mmol/L    Calcium 9.2 8.6 - 10.5 mg/dL    Total Protein 6.6 6.0 - 8.5 g/dL    Albumin 3.9 3.5 - 5.2 g/dL    ALT (SGPT) 24 1 - 33 U/L    AST (SGOT) 59 (H) 1 - 32 U/L    Alkaline Phosphatase 100 39 - 117 U/L    Total Bilirubin 0.6 0.0 - 1.2 mg/dL    Globulin 2.7 gm/dL    A/G Ratio 1.4 g/dL    BUN/Creatinine Ratio 12.0 7.0 - 25.0    Anion Gap 10.2 5.0 - 15.0 mmol/L    eGFR 52.6 (L) >60.0 mL/min/1.73   Lactic Acid, Plasma    Specimen: Blood   Result Value Ref Range    Lactate 2.0 0.5 - 2.0 mmol/L   Urinalysis With Microscopic If Indicated (No Culture) - Urine, Clean Catch    Specimen: Urine, Clean Catch   Result Value Ref Range    Color, UA Yellow Yellow, Straw    Appearance, UA Hazy (A) Clear    pH, UA 5.5 5.0 - 8.0    Specific Gravity, UA 1.021 1.005 - 1.030    Glucose, UA Negative Negative    Ketones, UA Trace (A) Negative    Bilirubin, UA Negative Negative    Blood, UA Trace (A) Negative    Protein, UA Negative Negative    Leuk Esterase, UA Trace (A) Negative    Nitrite, UA Negative Negative    Urobilinogen, UA 0.2 E.U./dL 0.2 - 1.0 E.U./dL   CBC Auto Differential    Specimen: Blood   Result Value Ref Range    WBC 9.83 3.40 - 10.80 10*3/mm3    RBC 4.57 3.77 - 5.28 10*6/mm3    Hemoglobin 13.5 12.0 - 15.9 g/dL    Hematocrit 42.7 34.0 - 46.6 %    MCV 93.4 79.0 - 97.0 fL    MCH 29.5 26.6 -  33.0 pg    MCHC 31.6 31.5 - 35.7 g/dL    RDW 12.9 12.3 - 15.4 %    RDW-SD 44.0 37.0 - 54.0 fl    MPV 10.3 6.0 - 12.0 fL    Platelets 319 140 - 450 10*3/mm3    Neutrophil % 59.5 42.7 - 76.0 %    Lymphocyte % 32.0 19.6 - 45.3 %    Monocyte % 6.4 5.0 - 12.0 %    Eosinophil % 1.2 0.3 - 6.2 %    Basophil % 0.5 0.0 - 1.5 %    Immature Grans % 0.4 0.0 - 0.5 %    Neutrophils, Absolute 5.84 1.70 - 7.00 10*3/mm3    Lymphocytes, Absolute 3.15 (H) 0.70 - 3.10 10*3/mm3    Monocytes, Absolute 0.63 0.10 - 0.90 10*3/mm3    Eosinophils, Absolute 0.12 0.00 - 0.40 10*3/mm3    Basophils, Absolute 0.05 0.00 - 0.20 10*3/mm3    Immature Grans, Absolute 0.04 0.00 - 0.05 10*3/mm3    nRBC 0.0 0.0 - 0.2 /100 WBC   Urinalysis, Microscopic Only - Urine, Clean Catch    Specimen: Urine, Clean Catch   Result Value Ref Range    RBC, UA 0-2 None Seen, 0-2 /HPF    WBC, UA 0-2 None Seen, 0-2 /HPF    Bacteria, UA 2+ (A) None Seen /HPF    Squamous Epithelial Cells, UA 3-6 (A) None Seen, 0-2 /HPF    Hyaline Casts, UA None Seen None Seen /LPF    Methodology Manual Light Microscopy    Green Top (Gel)   Result Value Ref Range    Extra Tube Hold for add-ons.    Lavender Top   Result Value Ref Range    Extra Tube hold for add-on    Gold Top - SST   Result Value Ref Range    Extra Tube Hold for add-ons.    Light Blue Top   Result Value Ref Range    Extra Tube Hold for add-ons.        CT Abdomen Pelvis With Contrast   Final Result   1.  Large hiatal hernia.   2.  Marked diffuse fatty liver.   3.  Scattered air-fluid levels throughout nondilated and nondistended   small bowel. Nonspecific but can BE seen with mild enteritis.   4.  Diverticulosis without CT evidence of acute diverticulitis.   5.  Other incidental/nonacute findings as above.       This report was finalized on 7/18/2023 9:24 AM by Dr. Harshal Huddleston MD.              ED Course  ED Course as of 07/27/23 1812   Tue Jul 18, 2023   0832 Patient has evidence of acute on chronic renal insufficiency and  she was given 500 mL of fluid.    Patient denies dysuria urgency or frequency.  She reports 3 weeks ago those symptoms were present which she completed oral antibiotics and they have since resolved.  Comparing labs today and review of previous urinalysis, patient does not appear to need further antibiotics [LK]      ED Course User Index  [LK] Isabelle Landaverde DO   No further antibiotic therapy was initiated given patient's recent history of C. difficile subsequently developing UTI that did require antibiotics.  All symptoms of dysuria and hesitancy have now resolved.                                       Medical Decision Making  Problems Addressed:  Enteritis: complicated acute illness or injury  Nausea and vomiting, unspecified vomiting type: complicated acute illness or injury    Amount and/or Complexity of Data Reviewed  Labs: ordered.  Radiology: ordered.    Risk  Prescription drug management.        Final diagnoses:   Nausea and vomiting, unspecified vomiting type   Enteritis       ED Disposition  ED Disposition       ED Disposition   Discharge    Condition   Stable    Comment   --               No follow-up provider specified.       Medication List        New Prescriptions      ondansetron 4 MG tablet  Commonly known as: ZOFRAN  Take 1 tablet by mouth Every 6 (Six) Hours.               Where to Get Your Medications        These medications were sent to [place], [place] - [address] [facility] [address] [place] [address] - [phone] [facility] [phone]   [address] [facility] [address] [address]      Phone: [phone]   ondansetron 4 MG tablet            Isabelle Landaverde DO  07/18/23 3741       Isabelle Landaverde DO  07/27/23 3376

## 2023-07-23 ENCOUNTER — HOSPITAL ENCOUNTER (EMERGENCY)
Facility: HOSPITAL | Age: 53
Discharge: HOME | End: 2023-07-23
Attending: EMERGENCY MEDICINE | Admitting: EMERGENCY MEDICINE
Payer: COMMERCIAL

## 2023-07-23 VITALS
DIASTOLIC BLOOD PRESSURE: 98 MMHG | HEIGHT: 67 IN | BODY MASS INDEX: 40.81 KG/M2 | SYSTOLIC BLOOD PRESSURE: 131 MMHG | OXYGEN SATURATION: 95 % | TEMPERATURE: 97.8 F | RESPIRATION RATE: 16 BRPM | WEIGHT: 260 LBS | HEART RATE: 60 BPM

## 2023-07-23 DIAGNOSIS — N39.0 BACTERIAL UTI: ICD-10-CM

## 2023-07-23 DIAGNOSIS — A04.0 INTESTINAL INFECTION DUE TO ENTEROPATHOGENIC E. COLI: Primary | ICD-10-CM

## 2023-07-23 DIAGNOSIS — A49.9 BACTERIAL UTI: ICD-10-CM

## 2023-07-23 LAB
027 TOXIN: NORMAL
ADV 40+41 DNA STL QL NAA+NON-PROBE: NOT DETECTED
ALBUMIN SERPL-MCNC: 3.5 G/DL (ref 3.5–5.2)
ALBUMIN/GLOB SERPL: 1.3 G/DL
ALP SERPL-CCNC: 87 U/L (ref 39–117)
ALT SERPL W P-5'-P-CCNC: 17 U/L (ref 1–33)
ANION GAP SERPL CALCULATED.3IONS-SCNC: 7.3 MMOL/L (ref 5–15)
AST SERPL-CCNC: 35 U/L (ref 1–32)
ASTRO TYP 1-8 RNA STL QL NAA+NON-PROBE: NOT DETECTED
BACTERIA UR QL AUTO: ABNORMAL /HPF
BASOPHILS # BLD AUTO: 0.03 10*3/MM3 (ref 0–0.2)
BASOPHILS NFR BLD AUTO: 0.4 % (ref 0–1.5)
BILIRUB SERPL-MCNC: 0.5 MG/DL (ref 0–1.2)
BILIRUB UR QL STRIP: NEGATIVE
BUN SERPL-MCNC: 14 MG/DL (ref 6–20)
BUN/CREAT SERPL: 11.8 (ref 7–25)
C CAYETANENSIS DNA STL QL NAA+NON-PROBE: NOT DETECTED
C COLI+JEJ+UPSA DNA STL QL NAA+NON-PROBE: NOT DETECTED
C DIFF TOX GENS STL QL NAA+PROBE: NEGATIVE
CALCIUM SPEC-SCNC: 8.8 MG/DL (ref 8.6–10.5)
CHLORIDE SERPL-SCNC: 112 MMOL/L (ref 98–107)
CK SERPL-CCNC: 141 U/L (ref 20–180)
CLARITY UR: ABNORMAL
CO2 SERPL-SCNC: 19.7 MMOL/L (ref 22–29)
COLOR UR: YELLOW
CREAT SERPL-MCNC: 1.19 MG/DL (ref 0.57–1)
CRP SERPL-MCNC: 0.63 MG/DL (ref 0–0.5)
CRYPTOSP DNA STL QL NAA+NON-PROBE: NOT DETECTED
DEPRECATED RDW RBC AUTO: 43.8 FL (ref 37–54)
E HISTOLYT DNA STL QL NAA+NON-PROBE: NOT DETECTED
EAEC PAA PLAS AGGR+AATA ST NAA+NON-PRB: NOT DETECTED
EC STX1+STX2 GENES STL QL NAA+NON-PROBE: NOT DETECTED
EGFRCR SERPLBLD CKD-EPI 2021: 55.8 ML/MIN/1.73
EOSINOPHIL # BLD AUTO: 0.27 10*3/MM3 (ref 0–0.4)
EOSINOPHIL NFR BLD AUTO: 3.2 % (ref 0.3–6.2)
EPEC EAE GENE STL QL NAA+NON-PROBE: DETECTED
ERYTHROCYTE [DISTWIDTH] IN BLOOD BY AUTOMATED COUNT: 12.9 % (ref 12.3–15.4)
ERYTHROCYTE [SEDIMENTATION RATE] IN BLOOD: 10 MM/HR (ref 0–20)
ETEC LTA+ST1A+ST1B TOX ST NAA+NON-PROBE: NOT DETECTED
G LAMBLIA DNA STL QL NAA+NON-PROBE: NOT DETECTED
GLOBULIN UR ELPH-MCNC: 2.7 GM/DL
GLUCOSE SERPL-MCNC: 96 MG/DL (ref 65–99)
GLUCOSE UR STRIP-MCNC: NEGATIVE MG/DL
HCT VFR BLD AUTO: 40.7 % (ref 34–46.6)
HGB BLD-MCNC: 13 G/DL (ref 12–15.9)
HGB UR QL STRIP.AUTO: ABNORMAL
HOLD SPECIMEN: NORMAL
HOLD SPECIMEN: NORMAL
HYALINE CASTS UR QL AUTO: ABNORMAL /LPF
IMM GRANULOCYTES # BLD AUTO: 0.04 10*3/MM3 (ref 0–0.05)
IMM GRANULOCYTES NFR BLD AUTO: 0.5 % (ref 0–0.5)
KETONES UR QL STRIP: NEGATIVE
LEUKOCYTE ESTERASE UR QL STRIP.AUTO: ABNORMAL
LIPASE SERPL-CCNC: 28 U/L (ref 13–60)
LYMPHOCYTES # BLD AUTO: 2.61 10*3/MM3 (ref 0.7–3.1)
LYMPHOCYTES NFR BLD AUTO: 30.6 % (ref 19.6–45.3)
MAGNESIUM SERPL-MCNC: 2.1 MG/DL (ref 1.6–2.6)
MCH RBC QN AUTO: 29.4 PG (ref 26.6–33)
MCHC RBC AUTO-ENTMCNC: 31.9 G/DL (ref 31.5–35.7)
MCV RBC AUTO: 92.1 FL (ref 79–97)
MONOCYTES # BLD AUTO: 0.68 10*3/MM3 (ref 0.1–0.9)
MONOCYTES NFR BLD AUTO: 8 % (ref 5–12)
NEUTROPHILS NFR BLD AUTO: 4.91 10*3/MM3 (ref 1.7–7)
NEUTROPHILS NFR BLD AUTO: 57.3 % (ref 42.7–76)
NITRITE UR QL STRIP: POSITIVE
NOROVIRUS GI+II RNA STL QL NAA+NON-PROBE: NOT DETECTED
NRBC BLD AUTO-RTO: 0 /100 WBC (ref 0–0.2)
P SHIGELLOIDES DNA STL QL NAA+NON-PROBE: NOT DETECTED
PH UR STRIP.AUTO: 5.5 [PH] (ref 5–8)
PLATELET # BLD AUTO: 264 10*3/MM3 (ref 140–450)
PMV BLD AUTO: 9.9 FL (ref 6–12)
POTASSIUM SERPL-SCNC: 3.9 MMOL/L (ref 3.5–5.2)
PROT SERPL-MCNC: 6.2 G/DL (ref 6–8.5)
PROT UR QL STRIP: NEGATIVE
RBC # BLD AUTO: 4.42 10*6/MM3 (ref 3.77–5.28)
RBC # UR STRIP: ABNORMAL /HPF
REF LAB TEST METHOD: ABNORMAL
RVA RNA STL QL NAA+NON-PROBE: NOT DETECTED
S ENT+BONG DNA STL QL NAA+NON-PROBE: NOT DETECTED
SAPO I+II+IV+V RNA STL QL NAA+NON-PROBE: NOT DETECTED
SHIGELLA SP+EIEC IPAH ST NAA+NON-PROBE: NOT DETECTED
SODIUM SERPL-SCNC: 139 MMOL/L (ref 136–145)
SP GR UR STRIP: 1.01 (ref 1–1.03)
SQUAMOUS #/AREA URNS HPF: ABNORMAL /HPF
T4 FREE SERPL-MCNC: 0.91 NG/DL (ref 0.93–1.7)
TSH SERPL DL<=0.05 MIU/L-ACNC: 2.28 UIU/ML (ref 0.27–4.2)
UROBILINOGEN UR QL STRIP: ABNORMAL
V CHOL+PARA+VUL DNA STL QL NAA+NON-PROBE: NOT DETECTED
V CHOLERAE DNA STL QL NAA+NON-PROBE: NOT DETECTED
WBC # UR STRIP: ABNORMAL /HPF
WBC NRBC COR # BLD: 8.54 10*3/MM3 (ref 3.4–10.8)
WHOLE BLOOD HOLD COAG: NORMAL
WHOLE BLOOD HOLD SPECIMEN: NORMAL
Y ENTEROCOL DNA STL QL NAA+NON-PROBE: NOT DETECTED

## 2023-07-23 PROCEDURE — 87186 SC STD MICRODIL/AGAR DIL: CPT | Performed by: EMERGENCY MEDICINE

## 2023-07-23 PROCEDURE — 96365 THER/PROPH/DIAG IV INF INIT: CPT

## 2023-07-23 PROCEDURE — 85652 RBC SED RATE AUTOMATED: CPT | Performed by: EMERGENCY MEDICINE

## 2023-07-23 PROCEDURE — 87086 URINE CULTURE/COLONY COUNT: CPT | Performed by: EMERGENCY MEDICINE

## 2023-07-23 PROCEDURE — 84439 ASSAY OF FREE THYROXINE: CPT | Performed by: EMERGENCY MEDICINE

## 2023-07-23 PROCEDURE — 83735 ASSAY OF MAGNESIUM: CPT | Performed by: EMERGENCY MEDICINE

## 2023-07-23 PROCEDURE — 25010000002 PROCHLORPERAZINE 10 MG/2ML SOLUTION: Performed by: EMERGENCY MEDICINE

## 2023-07-23 PROCEDURE — 87507 IADNA-DNA/RNA PROBE TQ 12-25: CPT | Performed by: EMERGENCY MEDICINE

## 2023-07-23 PROCEDURE — 87493 C DIFF AMPLIFIED PROBE: CPT | Performed by: EMERGENCY MEDICINE

## 2023-07-23 PROCEDURE — 83690 ASSAY OF LIPASE: CPT | Performed by: EMERGENCY MEDICINE

## 2023-07-23 PROCEDURE — 86140 C-REACTIVE PROTEIN: CPT | Performed by: EMERGENCY MEDICINE

## 2023-07-23 PROCEDURE — 82550 ASSAY OF CK (CPK): CPT | Performed by: EMERGENCY MEDICINE

## 2023-07-23 PROCEDURE — 87077 CULTURE AEROBIC IDENTIFY: CPT | Performed by: EMERGENCY MEDICINE

## 2023-07-23 PROCEDURE — 25010000002 AZTREONAM PER 500 MG: Performed by: EMERGENCY MEDICINE

## 2023-07-23 PROCEDURE — 81001 URINALYSIS AUTO W/SCOPE: CPT | Performed by: EMERGENCY MEDICINE

## 2023-07-23 PROCEDURE — 99283 EMERGENCY DEPT VISIT LOW MDM: CPT

## 2023-07-23 PROCEDURE — 80050 GENERAL HEALTH PANEL: CPT | Performed by: EMERGENCY MEDICINE

## 2023-07-23 PROCEDURE — 96375 TX/PRO/DX INJ NEW DRUG ADDON: CPT

## 2023-07-23 RX ORDER — SODIUM CHLORIDE 0.9 % (FLUSH) 0.9 %
10 SYRINGE (ML) INJECTION AS NEEDED
Status: DISCONTINUED | OUTPATIENT
Start: 2023-07-23 | End: 2023-07-23 | Stop reason: HOSPADM

## 2023-07-23 RX ORDER — DICYCLOMINE HCL 20 MG
20 TABLET ORAL EVERY 8 HOURS PRN
Qty: 30 TABLET | Refills: 0 | Status: SHIPPED | OUTPATIENT
Start: 2023-07-23

## 2023-07-23 RX ORDER — PROCHLORPERAZINE EDISYLATE 5 MG/ML
10 INJECTION INTRAMUSCULAR; INTRAVENOUS EVERY 6 HOURS PRN
Status: DISCONTINUED | OUTPATIENT
Start: 2023-07-23 | End: 2023-07-23 | Stop reason: HOSPADM

## 2023-07-23 RX ORDER — CIPROFLOXACIN 500 MG/1
500 TABLET, FILM COATED ORAL 2 TIMES DAILY
Qty: 28 TABLET | Refills: 0 | Status: SHIPPED | OUTPATIENT
Start: 2023-07-23 | End: 2023-08-06

## 2023-07-23 RX ADMIN — SODIUM CHLORIDE 1000 MG: 9 INJECTION, SOLUTION INTRAVENOUS at 14:09

## 2023-07-23 RX ADMIN — SODIUM CHLORIDE 1000 ML: 9 INJECTION, SOLUTION INTRAVENOUS at 12:07

## 2023-07-23 RX ADMIN — PROCHLORPERAZINE EDISYLATE 10 MG: 5 INJECTION INTRAMUSCULAR; INTRAVENOUS at 12:07

## 2023-07-24 NOTE — ED PROVIDER NOTES
Subjective     History provided by:  Patient   used: No    Diarrhea  The primary symptoms include nausea and diarrhea. Primary symptoms do not include fever, weight loss, fatigue, abdominal pain, vomiting, melena, hematemesis, jaundice, hematochezia, dysuria, myalgias, arthralgias or rash. The illness began more than 7 days ago. The onset was gradual. The problem has not changed since onset.  The illness does not include chills, anorexia, dysphagia, odynophagia, bloating, constipation, tenesmus, back pain or itching. Associated medical issues do not include inflammatory bowel disease, GERD, gallstones, liver disease, alcohol abuse, PUD, gastric bypass, bowel resection, irritable bowel syndrome, hemorrhoids or diverticulitis.     Review of Systems   Constitutional:  Negative for activity change, appetite change, chills, diaphoresis, fatigue, fever and weight loss.   HENT:  Negative for congestion, ear pain and sore throat.    Eyes:  Negative for redness.   Respiratory:  Negative for cough, chest tightness, shortness of breath and wheezing.    Cardiovascular:  Negative for chest pain, palpitations and leg swelling.   Gastrointestinal:  Positive for diarrhea and nausea. Negative for abdominal pain, anorexia, bloating, constipation, dysphagia, hematemesis, hematochezia, jaundice, melena and vomiting.   Genitourinary:  Negative for dysuria and urgency.   Musculoskeletal:  Negative for arthralgias, back pain, myalgias and neck pain.   Skin:  Negative for itching, pallor, rash and wound.   Neurological:  Negative for dizziness, speech difficulty, weakness and headaches.   Psychiatric/Behavioral:  Negative for agitation, behavioral problems, confusion and decreased concentration.    All other systems reviewed and are negative.    Past Medical History:   Diagnosis Date    Anxiety and depression     Arthritis     Back pain     Body piercing     both ears    Cancer     MELANOMA-right breast, back     Diabetes mellitus     Diverticulitis     GERD (gastroesophageal reflux disease)     H/O bladder infections     Hiatal hernia     History of being tatooed     Hot flashes     Hypertension     Kidney stones     Menstrual changes     Pelvic pain     Pneumonia     PONV (postoperative nausea and vomiting)     Urinary incontinence     Vitamin D deficiency     Wears glasses     for reading       Allergies   Allergen Reactions    Penicillins Anaphylaxis       Past Surgical History:   Procedure Laterality Date    ABDOMINAL SURGERY      BLADDER SURGERY      sling x 2     SECTION      x1    CHOLECYSTECTOMY      COLONOSCOPY      CYSTOSCOPY BOTOX INJECTION OF BLADDER N/A 3/13/2023    Procedure: Cystoscopy Botox injection of bladder;  Surgeon: Steven Wayne MD;  Location: [facility];  Service: Urology;  Laterality: N/A;    CYSTOSCOPY URETEROSCOPY LASER LITHOTRIPSY Right 2021    Procedure: CYSTOSCOPY URETEROSCOPY RETROGRADE RIGHT, URETEROGRAM;  Surgeon: Steven Wayne MD;  Location: [facility];  Service: Urology;  Laterality: Right;    ENDOSCOPY      INTERSTIM REMOVAL N/A 10/23/2019    Procedure: INTERSTIM REMOVAL;  Surgeon: Jose Adams MD;  Location: [facility];  Service: Urology    SEPTOPLASTY      SKIN BIOPSY      TONSILLECTOMY AND ADENOIDECTOMY      TUBAL ABDOMINAL LIGATION         Family History   Problem Relation Age of Onset    Diabetes Mother     Arthritis Mother     Cancer Mother         renal cell carconma    Diabetes Father     Hypertension Father     Asthma Son     Breast cancer Neg Hx        Social History     Socioeconomic History    Marital status:    Tobacco Use    Smoking status: Former     Packs/day: 0.50     Years: 30.00     Pack years: 15.00     Types: Cigarettes    Smokeless tobacco: Never   Vaping Use    Vaping Use: Some days    Substances: Nicotine, Flavoring    Devices: Disposable   Substance and Sexual Activity    Alcohol use: No    Drug use: No    Sexual  activity: Defer           Objective   Physical Exam  Vitals and nursing note reviewed.   Constitutional:       General: She is not in acute distress.     Appearance: Normal appearance. She is well-developed. She is not toxic-appearing or diaphoretic.   HENT:      Head: Normocephalic and atraumatic.      Right Ear: External ear normal.      Left Ear: External ear normal.      Nose: Nose normal.      Mouth/Throat:      Pharynx: No oropharyngeal exudate.      Tonsils: No tonsillar exudate.   Eyes:      General: Lids are normal.      Conjunctiva/sclera: Conjunctivae normal.      Pupils: Pupils are equal, round, and reactive to light.   Neck:      Thyroid: No thyromegaly.   Cardiovascular:      Rate and Rhythm: Normal rate and regular rhythm.      Pulses: Normal pulses.      Heart sounds: Normal heart sounds, S1 normal and S2 normal.   Pulmonary:      Effort: Pulmonary effort is normal. No tachypnea or respiratory distress.      Breath sounds: Normal breath sounds. No decreased breath sounds, wheezing or rales.   Chest:      Chest wall: No tenderness.   Abdominal:      General: Bowel sounds are normal. There is no distension.      Palpations: Abdomen is soft.      Tenderness: There is no abdominal tenderness. There is no guarding or rebound.   Musculoskeletal:         General: No tenderness or deformity. Normal range of motion.      Cervical back: Full passive range of motion without pain, normal range of motion and neck supple.   Lymphadenopathy:      Cervical: No cervical adenopathy.   Skin:     General: Skin is warm and dry.      Coloration: Skin is not pale.      Findings: No erythema or rash.   Neurological:      Mental Status: She is alert and oriented to person, place, and time.      GCS: GCS eye subscore is 4. GCS verbal subscore is 5. GCS motor subscore is 6.      Cranial Nerves: No cranial nerve deficit.      Sensory: No sensory deficit.   Psychiatric:         Speech: Speech normal.         Behavior: Behavior  normal.         Thought Content: Thought content normal.         Judgment: Judgment normal.       Procedures           ED Course                                           Medical Decision Making    History provided by:  Patient   used: No    Diarrhea  The primary symptoms include nausea and diarrhea. Primary symptoms do not include fever, weight loss, fatigue, abdominal pain, vomiting, melena, hematemesis, jaundice, hematochezia, dysuria, myalgias, arthralgias or rash. The illness began more than 7 days ago. The onset was gradual. The problem has not changed since onset.  The illness does not include chills, anorexia, dysphagia, odynophagia, bloating, constipation, tenesmus, back pain or itching. Associated medical issues do not include inflammatory bowel disease, GERD, gallstones, liver disease, alcohol abuse, PUD, gastric bypass, bowel resection, irritable bowel syndrome, hemorrhoids or diverticulitis.     Problems Addressed:  Bacterial UTI: complicated acute illness or injury  Intestinal infection due to enteropathogenic E. coli: complicated acute illness or injury    Amount and/or Complexity of Data Reviewed  External Data Reviewed: labs and notes.  Labs: ordered. Decision-making details documented in ED Course.    Risk  Prescription drug management.        Final diagnoses:   Intestinal infection due to enteropathogenic E. coli   Bacterial UTI       ED Disposition  ED Disposition       ED Disposition   Discharge    Condition   Stable    Comment   --               Celine Chacon PA-C  [address]  [address] [address] [phone]    Schedule an appointment as soon as possible for a visit in 1 day  EVALUATE         Medication List        New Prescriptions      ciprofloxacin 500 MG tablet  Commonly known as: CIPRO  Take 1 tablet by mouth 2 (Two) Times a Day for 14 days.     dicyclomine 20 MG tablet  Commonly known as: BENTYL  Take 1 tablet by mouth Every 8 (Eight) Hours As Needed  for Abdominal Cramping.               Where to Get Your Medications        These medications were sent to [facility], [place] - [address] [facility] [address] [place] [address] - [phone] [place] [id number] [facility] [address] [facility] [address] [address], [facility] [address]      Phone: [phone]   ciprofloxacin 500 MG tablet  dicyclomine 20 MG tablet            Michael Alberts MD  07/24/23 0074

## 2023-07-25 LAB — BACTERIA SPEC AEROBE CULT: ABNORMAL

## 2023-08-02 ENCOUNTER — OFFICE VISIT (OUTPATIENT)
Dept: GASTROENTEROLOGY | Facility: CLINIC | Age: 53
End: 2023-08-02
Payer: COMMERCIAL

## 2023-08-02 ENCOUNTER — HOSPITAL ENCOUNTER (OUTPATIENT)
Dept: GENERAL RADIOLOGY | Facility: HOSPITAL | Age: 53
Discharge: HOME | End: 2023-08-02
Admitting: NURSE PRACTITIONER
Payer: COMMERCIAL

## 2023-08-02 VITALS
HEART RATE: 68 BPM | HEIGHT: 67 IN | SYSTOLIC BLOOD PRESSURE: 123 MMHG | DIASTOLIC BLOOD PRESSURE: 78 MMHG | WEIGHT: 270 LBS | BODY MASS INDEX: 42.38 KG/M2

## 2023-08-02 DIAGNOSIS — R19.7 DIARRHEA, UNSPECIFIED TYPE: ICD-10-CM

## 2023-08-02 DIAGNOSIS — K59.04 CHRONIC IDIOPATHIC CONSTIPATION: Primary | ICD-10-CM

## 2023-08-02 DIAGNOSIS — A04.4 E. COLI GASTROENTERITIS: ICD-10-CM

## 2023-08-02 DIAGNOSIS — K21.9 GASTROESOPHAGEAL REFLUX DISEASE WITHOUT ESOPHAGITIS: ICD-10-CM

## 2023-08-02 DIAGNOSIS — K63.5 POLYP OF COLON, UNSPECIFIED PART OF COLON, UNSPECIFIED TYPE: ICD-10-CM

## 2023-08-02 DIAGNOSIS — K59.04 CHRONIC IDIOPATHIC CONSTIPATION: ICD-10-CM

## 2023-08-02 DIAGNOSIS — Z12.11 ENCOUNTER FOR SCREENING FOR MALIGNANT NEOPLASM OF COLON: ICD-10-CM

## 2023-08-02 DIAGNOSIS — R10.13 EPIGASTRIC PAIN: ICD-10-CM

## 2023-08-02 DIAGNOSIS — R11.2 NAUSEA AND VOMITING, UNSPECIFIED VOMITING TYPE: ICD-10-CM

## 2023-08-02 PROCEDURE — 74018 RADEX ABDOMEN 1 VIEW: CPT | Performed by: RADIOLOGY

## 2023-08-02 PROCEDURE — 74018 RADEX ABDOMEN 1 VIEW: CPT

## 2023-08-02 RX ORDER — BISACODYL 5 MG/1
20 TABLET, DELAYED RELEASE ORAL ONCE
Qty: 4 TABLET | Refills: 0 | Status: SHIPPED | OUTPATIENT
Start: 2023-08-02 | End: 2023-08-02

## 2023-08-02 RX ORDER — POLYETHYLENE GLYCOL 3350 17 G/17G
510 POWDER, FOR SOLUTION ORAL TAKE AS DIRECTED
Qty: 510 G | Refills: 0 | Status: SHIPPED | OUTPATIENT
Start: 2023-08-02

## 2023-08-02 RX ORDER — PANTOPRAZOLE SODIUM 40 MG/1
40 TABLET, DELAYED RELEASE ORAL DAILY
Qty: 30 TABLET | Refills: 5 | Status: SHIPPED | OUTPATIENT
Start: 2023-08-02

## 2023-08-02 NOTE — H&P (VIEW-ONLY)
DATE OF CONSULTATION:  8/2/2023    REASON FOR REFERRAL: Nausea and vomiting, abdominal pain    REFERRING PHYSICIAN:  Celine Chacon PA-C    CHIEF COMPLAINT:  Nausea and vomiting, abdominal bloating, diarrhea    HISTORY OF PRESENT ILLNESS:   Briseyda Edouard is a  52 y.o. female who is being seen today at the request of Celine Chacon PA-C for evaluation and treatment of nausea, vomiting and abdominal pain. Ms. Edouard is a very poor historian. However, she reports she has been feeling poorly since June 24th. At that time, she was evaluated at [facility] ED for complaints of right flank pain and was treated for UTI. Constipation was also noted on CT imaging on 6/24 and she was advised to take Miralax daily. In mid July, she developed significant nausea, vomiting, abdominal pain and diarrhea. She reports vomiting so much, she felt like she was going to die. She was also having ~10 episodes of diarrhea or more per day. She represented to [facility] ED and underwent stool studies including GI panel which was positive for E. Coli. C-diff was negative. Repeat CT A/P on 7/18/23 showed a large hiatal hernia, marked diffuse fatty liver, scattered air-fluid levels throughout nondilated and nondistended small bowel suggestive of mild enteritis. Also noted diverticulosis without CT evidence of acute diverticulitis. She was given Rx for Cipro and Bentyl twice daily as needed both of which she is taking. She continues to have nausea and reports vomiting yesterday. She is also taking zofran as needed which has not been helpful. She complains of GERD with several flares per week. She complains of burning in her chest and throat along with epigastric pain. She is not currently on PPI therapy. She is s/p cholecystectomy. She denies having weight loss, but weight gain. She complains of abdominal bloating and cramping. She reports having diarrhea ~5-6 episodes per day with stool type 6-7 on BSS. She says she will complete Cipro this Sunday. She denies  having melena or BRBPR. She reports having colonoscopy ~9 years ago and has personal history of colon polyps. She reports family history of colon cancer in her paternal grandfather.  She has no other complaints today.     PAST MEDICAL HISTORY:    Past Medical History:   Diagnosis Date    Anxiety and depression     Arthritis     Back pain     Body piercing     both ears    Cancer     MELANOMA-right breast, back    Diabetes mellitus     Diverticulitis     GERD (gastroesophageal reflux disease)     H/O bladder infections     Hiatal hernia     History of being tatooed     Hot flashes     Hypertension     Kidney stones     Menstrual changes     Pelvic pain     Pneumonia     PONV (postoperative nausea and vomiting)     Urinary incontinence     Vitamin D deficiency     Wears glasses     for reading       PAST SURGICAL HISTORY:  Past Surgical History:   Procedure Laterality Date    ABDOMINAL SURGERY      BLADDER SURGERY      sling x 2     SECTION      x1    CHOLECYSTECTOMY      COLONOSCOPY      CYSTOSCOPY BOTOX INJECTION OF BLADDER N/A 3/13/2023    Procedure: Cystoscopy Botox injection of bladder;  Surgeon: Steven Wayne MD;  Location: [facility];  Service: Urology;  Laterality: N/A;    CYSTOSCOPY URETEROSCOPY LASER LITHOTRIPSY Right 2021    Procedure: CYSTOSCOPY URETEROSCOPY RETROGRADE RIGHT, URETEROGRAM;  Surgeon: Steven Wayne MD;  Location: [facility];  Service: Urology;  Laterality: Right;    ENDOSCOPY      INTERSTIM REMOVAL N/A 10/23/2019    Procedure: INTERSTIM REMOVAL;  Surgeon: Jose Adams MD;  Location: [facility];  Service: Urology    SEPTOPLASTY      SKIN BIOPSY      TONSILLECTOMY AND ADENOIDECTOMY      TUBAL ABDOMINAL LIGATION         FAMILY HISTORY:  Family History   Problem Relation Age of Onset    Diabetes Mother     Arthritis Mother     Cancer Mother         renal cell carconma    Diabetes Father     Hypertension Father     Asthma Son     Breast cancer Neg Hx         SOCIAL HISTORY:  Social History     Socioeconomic History    Marital status:    Tobacco Use    Smoking status: Former     Packs/day: 0.50     Years: 30.00     Pack years: 15.00     Types: Cigarettes    Smokeless tobacco: Never   Vaping Use    Vaping Use: Some days    Substances: Nicotine, Flavoring    Devices: Disposable   Substance and Sexual Activity    Alcohol use: No    Drug use: No    Sexual activity: Defer     MEDICATIONS:  The current medication list was reviewed in the EMR    Current Outpatient Medications:     ascorbic acid (VITAMIN C) 500 MG tablet, Take 1 tablet by mouth every night at bedtime., Disp: , Rfl:     busPIRone (BUSPAR) 10 MG tablet, Take 1 tablet by mouth every night at bedtime., Disp: , Rfl:     ciprofloxacin (CIPRO) 500 MG tablet, Take 1 tablet by mouth 2 (Two) Times a Day for 14 days., Disp: 28 tablet, Rfl: 0    dicyclomine (BENTYL) 20 MG tablet, Take 1 tablet by mouth Every 8 (Eight) Hours As Needed for Abdominal Cramping., Disp: 30 tablet, Rfl: 0    estrogens, conjugated, (PREMARIN) 0.3 MG tablet, Take 1 tablet by mouth Daily., Disp: , Rfl:     FLUoxetine (PROzac) 20 MG capsule, Take 3 capsules by mouth Daily., Disp: , Rfl:     fluticasone (FLONASE) 50 MCG/ACT nasal spray, 2 sprays into the nostril(s) as directed by provider Daily As Needed for Rhinitis or Allergies., Disp: , Rfl:     lisinopril (PRINIVIL,ZESTRIL) 10 MG tablet, Take 1 tablet by mouth Daily., Disp: , Rfl:     meloxicam (MOBIC) 15 MG tablet, Take 1 tablet by mouth every night at bedtime., Disp: , Rfl:     metFORMIN ER (GLUCOPHAGE-XR) 500 MG 24 hr tablet, Take 1 tablet by mouth Daily With Breakfast., Disp: , Rfl:     ondansetron (ZOFRAN) 4 MG tablet, Take 1 tablet by mouth Every 6 (Six) Hours., Disp: 20 tablet, Rfl: 0    polyethylene glycol (MIRALAX) 17 GM/SCOOP powder, Take 17 g by mouth Daily., Disp: 507 g, Rfl: 0    sulfamethoxazole-trimethoprim (BACTRIM DS,SEPTRA DS) 800-160 MG per tablet, Take 1 tablet by  "mouth 2 (Two) Times a Day., Disp: 14 tablet, Rfl: 0    vitamin B-12 (CYANOCOBALAMIN) 500 MCG tablet, Take 1 tablet by mouth Daily., Disp: , Rfl:     vitamin D (ERGOCALCIFEROL) 38647 units capsule capsule, Take 1 capsule by mouth Every 7 (Seven) Days. Taking on wednesdays, Disp: , Rfl:     vitamin E 400 UNIT capsule, Take 1 capsule by mouth every night at bedtime., Disp: , Rfl:     ALLERGIES:    Allergies   Allergen Reactions    Penicillins Anaphylaxis       REVIEW OF SYSTEMS:    A comprehensive 14 point review of systems was performed.  Significant findings as mentioned above.  All other systems reviewed and are negative.      Physical Exam   Vital Signs: /78 (BP Location: Left arm, Patient Position: Sitting, Cuff Size: Large Adult)   Pulse 68   Ht 170.2 cm (67\")   Wt 122 kg (270 lb)   LMP 04/21/2021   BMI 42.29 kg/m²     General: Obese, Alert and oriented x 3, in no acute distress.   Head: ATNC   Eyes: PERRL, No evidence of conjunctivitis.   Nose: No nasal discharge.   Mouth: Oral mucosal membranes moist. No oral ulceration or hemorrhages.   Neck: Neck supple. No thyromegaly. No JVD.   Lungs: Clear in all fields to A&P without rales, rhonchi or wheezing.   Heart: Regular rate and rhythm. No murmurs, rubs, or gallops.   Abdomen: Soft. Bowel sounds are normoactive. Nontender with palpation.   Extremities: No cyanosis or edema.   Neurologic: Grossly non-focal exam    IMAGING:  CT Abdomen Pelvis Without Contrast    Result Date: 6/24/2023   MILDLY DISTENDED URINARY BLADDER.  CONSTIPATION.  FATTY LIVER.  HIATAL HERNIA.  This report was finalized on 6/24/2023 8:07 AM by Kim Cabrera MD.      CT Abdomen Pelvis With Contrast    Result Date: 7/18/2023  1.  Large hiatal hernia. 2.  Marked diffuse fatty liver. 3.  Scattered air-fluid levels throughout nondilated and nondistended small bowel. Nonspecific but can BE seen with mild enteritis. 4.  Diverticulosis without CT evidence of acute diverticulitis. 5.  Other " incidental/nonacute findings as above.  This report was finalized on 7/18/2023 9:24 AM by Dr. Harshal Huddleston MD.      XR Abdomen KUB    Result Date: 8/2/2023    Scattered to moderate stool throughout the colon.  This report was finalized on 8/2/2023 9:13 AM by Dr. Pablo Del Castillo MD.           RECENT LABS:  Lab Results   Component Value Date    WBC 8.54 07/23/2023    HGB 13.0 07/23/2023    HCT 40.7 07/23/2023    MCV 92.1 07/23/2023    RDW 12.9 07/23/2023     07/23/2023    NEUTRORELPCT 57.3 07/23/2023    LYMPHORELPCT 30.6 07/23/2023    MONORELPCT 8.0 07/23/2023    EOSRELPCT 3.2 07/23/2023    BASORELPCT 0.4 07/23/2023    NEUTROABS 4.91 07/23/2023    LYMPHSABS 2.61 07/23/2023       Lab Results   Component Value Date     07/23/2023    K 3.9 07/23/2023    CO2 19.7 (L) 07/23/2023     (H) 07/23/2023    BUN 14 07/23/2023    CREATININE 1.19 (H) 07/23/2023    EGFRIFNONA 51 (L) 05/07/2021    EGFRIFAFRI 61 06/22/2016    GLUCOSE 96 07/23/2023    CALCIUM 8.8 07/23/2023    ALKPHOS 87 07/23/2023    AST 35 (H) 07/23/2023    ALT 17 07/23/2023    BILITOT 0.5 07/23/2023    ALBUMIN 3.5 07/23/2023    PROTEINTOT 6.2 07/23/2023    MG 2.1 07/23/2023     ASSESSMENT & PLAN:  Briseyda Edouard is a very pleasant 52 y.o. female with    E. Coli gastroenteritis:  -GI panel on 7/23 was positive for E. Coli. Likely self limited. She was given Cipro in the ED which she will complete this Sunday. Obtained KUB today which showed constipation and managed as below. Would avoid bentyl as this can contribute to worsening constipation as well as zofran.     2. Constipation:   3. Personal history of colon polyps:  4. Family history of colon cancer (paternal grandfather):    -Constipation was noted on CT imaging from 6/24 and also on KUB today which showed moderate stool throughout. Recommended she take Miralax 1 capful twice daily and Citrucel daily.   -Also recommended repeat colonoscopy and patient was agreeable. Will schedule.     5. Nausea  and vomitin. GERD/epigastric pain:  7. Large hiatal hernia:  8. S/P cholecystectomy:    -Recommended EGD to further evaluate and patient was agreeable. Will schedule. In the meantime, will start her on Protonix 40 mg PO daily.     RTC following the above procedures.     The patient was in agreement with the plan and all questions were answered to her satisfaction.     Thank you so much for allowing us to participate in the care of Briseyda Eduoard . Please do not hesitate to contact us with any questions or concerns.             Electronically Signed by: JANICE Jarvis , 2023 08:18 EDT       CC:   FEDERICO Nichols Tracy, PA-C

## 2023-08-11 ENCOUNTER — OFFICE VISIT (OUTPATIENT)
Dept: UROLOGY | Facility: CLINIC | Age: 53
End: 2023-08-11
Payer: COMMERCIAL

## 2023-08-11 VITALS
BODY MASS INDEX: 41.44 KG/M2 | WEIGHT: 264 LBS | HEIGHT: 67 IN | DIASTOLIC BLOOD PRESSURE: 83 MMHG | HEART RATE: 87 BPM | SYSTOLIC BLOOD PRESSURE: 120 MMHG

## 2023-08-11 DIAGNOSIS — M54.50 CHRONIC BILATERAL LOW BACK PAIN WITHOUT SCIATICA: ICD-10-CM

## 2023-08-11 DIAGNOSIS — N32.81 DETRUSOR INSTABILITY: ICD-10-CM

## 2023-08-11 DIAGNOSIS — N30.00 ACUTE CYSTITIS WITHOUT HEMATURIA: ICD-10-CM

## 2023-08-11 DIAGNOSIS — N39.0 E-COLI UTI: ICD-10-CM

## 2023-08-11 DIAGNOSIS — R10.2 PELVIC PAIN IN FEMALE: ICD-10-CM

## 2023-08-11 DIAGNOSIS — R31.0 GROSS HEMATURIA: ICD-10-CM

## 2023-08-11 DIAGNOSIS — R35.0 URINE FREQUENCY: Primary | ICD-10-CM

## 2023-08-11 DIAGNOSIS — G89.29 CHRONIC BILATERAL LOW BACK PAIN WITHOUT SCIATICA: ICD-10-CM

## 2023-08-11 DIAGNOSIS — N30.01 ACUTE CYSTITIS WITH HEMATURIA: ICD-10-CM

## 2023-08-11 DIAGNOSIS — R31.29 MICROHEMATURIA: ICD-10-CM

## 2023-08-11 DIAGNOSIS — B96.20 E-COLI UTI: ICD-10-CM

## 2023-08-11 LAB
BILIRUB BLD-MCNC: NEGATIVE MG/DL
CLARITY, POC: CLEAR
COLOR UR: YELLOW
EXPIRATION DATE: ABNORMAL
GLUCOSE UR STRIP-MCNC: NEGATIVE MG/DL
KETONES UR QL: NEGATIVE
LEUKOCYTE EST, POC: ABNORMAL
Lab: ABNORMAL
NITRITE UR-MCNC: NEGATIVE MG/ML
PH UR: 6 [PH] (ref 5–8)
PROT UR STRIP-MCNC: NEGATIVE MG/DL
RBC # UR STRIP: ABNORMAL /UL
SP GR UR: 1.01 (ref 1–1.03)
UROBILINOGEN UR QL: NORMAL

## 2023-08-11 PROCEDURE — 87086 URINE CULTURE/COLONY COUNT: CPT | Performed by: NURSE PRACTITIONER

## 2023-08-11 RX ORDER — FLUCONAZOLE 150 MG/1
150 TABLET ORAL ONCE
Qty: 2 TABLET | Refills: 0 | Status: SHIPPED | OUTPATIENT
Start: 2023-08-11 | End: 2023-08-11

## 2023-08-11 RX ORDER — HYDROCODONE BITARTRATE AND ACETAMINOPHEN 5; 325 MG/1; MG/1
1 TABLET ORAL EVERY 6 HOURS PRN
COMMUNITY

## 2023-08-11 RX ORDER — NITROFURANTOIN MONOHYDRATE/MACROCRYSTALLINE 25; 75 MG/1; MG/1
CAPSULE ORAL
Qty: 56 CAPSULE | Refills: 3 | Status: SHIPPED | OUTPATIENT
Start: 2023-08-11

## 2023-08-11 RX ORDER — ONDANSETRON 4 MG/1
4 TABLET, FILM COATED ORAL EVERY 12 HOURS PRN
Qty: 20 TABLET | Refills: 0 | Status: SHIPPED | OUTPATIENT
Start: 2023-08-11

## 2023-08-11 NOTE — PROGRESS NOTES
Chief Complaint  OAB/DI/ ACUTE CYSTITIS  (FOLLOW UP POST BOTOX)    Subjective          Briseyda Edouard presents to [facility] for OAB/DI  History of Present Illness    Mrs. Briseyda Edouard is a pleasant 52-year-old female who returns to clinic today for evaluation. This is a 6-month follow-up with prior significant complaints of acute cystitis with hematuria, OAB/DI complicated by by urine frequency, urgency, nocturia, and incontinence, which had been recalcitrant to oral therapy after failing trial with Detrol, oxybutynin, Myrbetriq, and also Gemtesa.     The patient is post bladder hydrodistension procedure/Botulin injection to her bladder secondary to severe interstitial cystitis with Dr. Wayne. The procedure was last completed on 03/13/2023. She has done relatively well with intermittent episodes of dysuria, urine frequency, and urgency requiring emergency room visits. During her initial evaluation in the ER, however, one more thing to look on 06/24/2023, patient was noted to have mixed jose angel. Her subsequent urine culture rechecked on 07/23/2023 showed E. coli for which she was treated with antibiotic therapy with ciprofloxacin per her PCP. She has since completed therapy and returns to clinic today for reevaluation. Patient denies any postop complications from her Botox procedure such as risk for anesthesia, bleeding, and most significantly the risk of urinary retention. Her urine dipstick still shows 1+ leukocyte esterase. It is negative for nitrites. It is negative for gross, but show 1+ microscopic hematuria significantly. Her PVR is 0 mL.    TODAY, The patient states she has been sick all summer. On 06/24/2023, she woke up vomiting. Her  took her to the ER. She was told she had a UTI and constipation. Her symptoms never improved. She went back to the ER and they did not know she had a stomach virus. She was tested for H. pylori and pancreas. She does not  have a gallbladder and appendix. She kept getting sick. She went back to the ER and they told her she had another UTI and E. coli. She finished her antibiotics on 08/07/2023. She denies any current symptoms of vomiting. She denies any burning with urination. She always has back pain. She had a steroid injection into her back. She was given 14 days of antibiotics. She is taking MiraLAX. She is supposed to have an upper and lower GI in a couple of weeks. She denies any symptoms today. She denies any burning, frequency, urgency, or odor to her urine. She wears 2 to 3 pads a day. She is not sitting on RPE. She is allergic to Penicillin. She has taken Macrobid in the past.    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    Right ureteral stone 05/20/2021    Right flank pain, chronic 05/20/2021    Acute cystitis without hematuria 05/20/2021    Hematuria 07/12/2022    DDD (degenerative disc disease), lumbar 10/17/2022    Chronic bilateral low back pain without sciatica 10/17/2022    Urine frequency 03/01/2023    Frequency of micturition 03/01/2023    Incomplete emptying of bladder 03/01/2023    Interstitial cystitis (chronic) with hematuria 03/01/2023    Chronic idiopathic constipation 08/14/2023    Gastroesophageal reflux disease without esophagitis 08/14/2023    Encounter for screening for malignant neoplasm of colon 08/14/2023    Epigastric pain 08/14/2023    Nausea and vomiting 08/14/2023    Polyp of colon 08/14/2023     Resolved Ambulatory Problems      "Diagnosis Date Noted    No Resolved Ambulatory Problems     Past Medical History:   Diagnosis Date    Arthritis     Back pain     Body piercing     Cancer     Diabetes mellitus     Diverticulitis     GERD (gastroesophageal reflux disease)     H/O bladder infections     Hiatal hernia     History of being tatooed     Hot flashes     Hypertension     Kidney stones     Menstrual changes     Pelvic pain     Pneumonia     PONV (postoperative nausea and vomiting)     Urinary incontinence     Wears glasses       Objective   Vital Signs:   /83 (BP Location: Right arm, Patient Position: Sitting)   Pulse 87   Ht 170.2 cm (67.01\")   Wt 120 kg (264 lb)   BMI 41.34 kg/mý       ROS:   Review of Systems   Constitutional:  Positive for activity change, appetite change and unexpected weight gain. Negative for chills, diaphoresis, fatigue, fever and unexpected weight loss.   HENT: Negative.  Negative for congestion, ear discharge, ear pain, nosebleeds, rhinorrhea, sinus pressure and sore throat.    Eyes: Negative.  Negative for blurred vision, double vision, photophobia, pain, redness and visual disturbance.   Respiratory:  Negative for apnea, cough, chest tightness, shortness of breath, wheezing and stridor.    Cardiovascular:  Negative for chest pain, palpitations and leg swelling.   Gastrointestinal:  Positive for abdominal distention, abdominal pain, constipation, nausea and GERD. Negative for diarrhea and vomiting.   Endocrine: Negative.  Negative for polydipsia, polyphagia and polyuria.   Genitourinary:  Positive for pelvic pain, pelvic pressure and urgency. Negative for decreased urine volume, difficulty urinating, dyspareunia, dysuria, flank pain, frequency, genital sores, hematuria, urinary incontinence and vaginal discharge.   Musculoskeletal:  Positive for back pain and myalgias. Negative for arthralgias and joint swelling.   Skin:  Positive for color change, dry skin and pallor. Negative for rash and wound. "   Allergic/Immunologic: Negative.    Neurological:  Negative for dizziness, tremors, syncope, weakness, light-headedness, headache, memory problem and confusion.   Hematological: Negative.    Psychiatric/Behavioral:  Positive for sleep disturbance and stress. Negative for behavioral problems and decreased concentration.       Physical Exam  Constitutional:       General: She is in acute distress.      Appearance: She is well-developed. She is obese. She is ill-appearing.   HENT:      Head: Normocephalic and atraumatic.   Eyes:      Pupils: Pupils are equal, round, and reactive to light.   Neck:      Thyroid: No thyromegaly.      Trachea: No tracheal deviation.   Cardiovascular:      Rate and Rhythm: Normal rate and regular rhythm.      Heart sounds: No murmur heard.  Pulmonary:      Effort: Pulmonary effort is normal. No respiratory distress.      Breath sounds: Normal breath sounds. No stridor. No wheezing.   Abdominal:      General: Bowel sounds are normal.      Palpations: Abdomen is soft.      Tenderness: There is no abdominal tenderness.   Genitourinary:     Labia:         Right: No tenderness.         Left: No tenderness.       Vagina: Normal. No vaginal discharge.      Comments: Urine frequency, urgency, incontinence, and dysuria times  Musculoskeletal:         General: No deformity. Normal range of motion.      Cervical back: Normal range of motion.   Skin:     General: Skin is warm and dry.      Coloration: Skin is not pale.      Findings: No erythema or rash.   Neurological:      Mental Status: She is alert and oriented to person, place, and time.      Cranial Nerves: No cranial nerve deficit.      Sensory: No sensory deficit.      Coordination: Coordination normal.   Psychiatric:         Behavior: Behavior normal.         Thought Content: Thought content normal.         Judgment: Judgment normal.      Result Review :     EZIO          7/18/2023    07:39 7/23/2023    12:20 8/11/2023    11:39   Urinalysis    Squamous Epithelial Cells, UA 3-6  3-6     Specific Gravity, UA 1.021  1.014     Ketones, UA Trace  Negative  Negative    Blood, UA Trace  Moderate (2+)     Leukocytes, UA Trace  Moderate (2+)  Small (1+)    Nitrite, UA Negative  Positive     RBC, UA 0-2  6-12     WBC, UA 0-2  Too Numerous to Count     Bacteria, UA 2+  4+       Urine Culture          6/24/2023    06:29 7/23/2023    12:20 8/11/2023    11:40   Urine Culture   Urine Culture >100,000 CFU/mL Mixed Belkis Isolated  >100,000 CFU/mL Escherichia coli  No growth             Assessment and Plan    Problem List Items Addressed This Visit          Gastrointestinal Abdominal     Pelvic pain in female    Relevant Medications    Macrobid 100 MG capsule    ondansetron (Zofran) 4 MG tablet       Genitourinary and Reproductive     Microhematuria    Relevant Medications    Macrobid 100 MG capsule    ondansetron (Zofran) 4 MG tablet    Gross hematuria    Detrusor instability    Relevant Medications    Macrobid 100 MG capsule    ondansetron (Zofran) 4 MG tablet    Urinary tract infection with hematuria    Relevant Medications    Macrobid 100 MG capsule    ondansetron (Zofran) 4 MG tablet    Other Relevant Orders    Urine Culture - Urine, Urine, Clean Catch (Completed)    Acute cystitis without hematuria    Overview     Added automatically from request for surgery [id number]         Relevant Medications    Macrobid 100 MG capsule    ondansetron (Zofran) 4 MG tablet    Urine frequency - Primary    Overview     Added automatically from request for surgery [id number]         Relevant Medications    Macrobid 100 MG capsule    ondansetron (Zofran) 4 MG tablet    Other Relevant Orders    POC Urinalysis Dipstick, Automated (Completed)    Bladder Scan (Completed)       Musculoskeletal and Injuries    Chronic bilateral low back pain without sciatica    Relevant Medications    ondansetron (Zofran) 4 MG tablet     Other Visit Diagnoses       E-coli UTI        Relevant Medications     Macrobid 100 MG capsule    ondansetron (Zofran) 4 MG tablet                                                          ASSESSMENT  ACUTE CYSTITIS WITH HEMATURIA/DETRUSOR INSTABILITY/FLANK PAIN  Ms. Briseyda Gonzalez  is a pleasant 52-year-old patient, who RETURNS to clinic today for  EVALUATION, with concerns for Acute cystitis with Hematuria, Flank Pain and detrusors instability.Her Urine dipstick today is negative for any leukocyte estrace, otherwise is completely negative for any infection.  It is negative for gross hematuria.  She has 3+ microscopic hematuria which is chronic for her(states she has always had microscopic hematuria for over 20+ years).     Interstitial Cystitis-we discussed the diagnosis and management of this condition.  I indicated that it was a multifactorial condition with multifactorial symptomatology, Including frequency, urgency, the sensation of urinary tract infection in the absence of a positive culture, sexual symptomatology including significant dyspareunia. We discussed treatment options including the importance of making a clinical diagnosis and the cystoscopic findings including Hunner's ulcers etc.       We also discussed medical management including pharmacologic treatment such as amitriptyline, naturopathic treatments such as pumpkin oil and which more aggressive options of Botox injections as well as the relative risks and merits of this.  We also discussed the use of dietary manipulation including the over-the-counter product relief which basically decreases the acid in the urine and avoidance of ascitic-containing foods such as citrus is etc.                                                  PLAN.     We resent her urine for culture. I will call her with results if any bacteria growth     CONTINUE MACROBID 100MG BID X 5 DAYS, THEN NIGHTLY FOR SUPPRESSIVE THERAPY-RECURRENT UTIs      She has been encouraged to  increase her p.o. fluid intake to at least 1 to 2 L daily and avoid  bladder irritants such as caffeine products, spicy foods, and citrusy foods.  Stopping all p.o. fluids 2 hours prior to bedtime      I recommend concomitant probiotics with treatment with antibiotics to protect the rectal reservoir including over-the-counter yogurt preparations to shannan oral pills containing the appropriate probiotics. Patient reports the diligent use of Probiotics.     CONTINUE  Gemtesa 50 mg nightly-Detrusor instability/severe urinary incontinence/IC     She is to also continue other medications for atrophic/yeast vaginitis, atrophic vaginitis as prescribed above.    We discussed treatment options for HER BACK/FLANK pain with patient encouraged to continue conservative therapy alternating NSAID/Tylenol as tolerated, Also including hot baths, showers, warm compresses to lower back as tolerated. Also encouraged walking, physical therapy, light exercises as tolerated    Rest is the most important treatment in the case of BACK/FLANK pain. Rest and physical therapy are enough to improve minor pain. Discussed to monitor his daily routine with prevention of flank pain by: At least Drinking 8 glasses of water per day, Limiting your alcohol consumption.  Having a healthy diet containing fruits, vegetables, and a lot of fluids, Practicing safe sex.  Also maintaining proper hygiene of your body as well as the environment.    Discussed a kidney stone prevention diet to include increasing p.o. fluid intake, to at least 1 to 2 L of water daily.  She is to avoid caffeine products such as cola, coffee, and to avoid soft or soda drinks.  She is to decrease her sodium consumption as in  Fast foods, reeves, salted nuts, canned foods, and smoked/cured foods. She is also to decrease her oxalate consumption, as in spinach, Catie greens, and Rhubarb.  Also important is to decrease protein intake, as in red meats, peanut butter, and also avoid nuts.    FOLLOW UP WITH GI-ABDOMINAL PAIN, IBS-C    Return to clinic sooner  if need be     Patient is agreeable  WITH plan of care.       WILL FOLLOW UP IN CLINIC AS DISCUSSED     SHE MAY RETURN SOONER IF NEED BE     PATIEN IS AGREEABLE WITH PLAN OF CARE    Patient reports that she is not currently experiencing any symptoms of urinary incontinence.  RADIOLOGY (CT AND/OR KUB):    CT Abdomen and Pelvis: Results for orders placed during the hospital encounter of 06/13/17    CT Abdomen Pelvis With & Without Contrast    Narrative  CT ABDOMEN AND PELVIS WITH AND WITHOUT CONTRAST-    CLINICAL INDICATION: Gross hematuria; N39.0-Urinary tract infection,  site not specified; R31.0-Gross hematuria.      DOSE: 3182.06 mGy.cm  COMPARISON: 12/28/2016.    Radiation dose reduction techniques were utilized per ALARA protocol.  Automated exposure control was initiated through either or Flattr or  Enthuse software packages by  protocol.      PROCEDURE: Axial images were acquired from the lung bases through the  pubic symphysis before and after IV contrast. No oral contrast was  administered.    FINDINGS: Lung bases show no pleural effusions.    There is a moderate sized hiatal hernia.    The liver is homogeneous but slightly decreased in attenuation  compatible with fatty infiltration of the liver.    Spleen is homogeneous.    There is no adrenal enlargement.    Kidneys show no hydronephrosis or hydroureter. No evidence of a solid  renal mass.    Cysts are seen on the ovaries bilaterally. The uterus is heterogeneous.    The bladder is somewhat decompressed.    Impression  Heterogeneity of the uterus.  Bilateral adnexal cysts.  Other findings as above.    This report was finalized on 6/13/2017 9:49 AM by Dr. Hossein Shine MD.     CT Stone Protocol: Results for orders placed during the hospital encounter of 08/16/19    CT Abdomen Pelvis Stone Protocol    Narrative  CT ABDOMEN PELVIS STONE PROTOCOL-    REASON FOR EXAM: Hematuria; R31.29-Other microscopic hematuria    COMPARISON: Today's CT is  compared with an earlier CT scan done in  06/2017.    FINDINGS: Spiral scans were obtained through the kidneys, ureters, and  bladder. The kidneys are normal in size and shape. There was no  hydronephrosis. There were no stones in the intrarenal collecting  systems. There were no stones noted along the course of the ureters.  Urinary bladder is smooth in contour.    Impression  No evidence of stone or obstruction was seen involving the  collecting system of either kidney.    846.43 mGy.cm  The radiation dose reduction device was utilized for each scan per the  ALARA (as low as reasonably achievable) protocol.    This report was finalized on 8/16/2019 2:07 PM by Dr. Chapo Rosales II, MD.     KUB: Results for orders placed during the hospital encounter of 08/02/23    XR Abdomen KUB    Narrative  EXAM:  XR Abdomen, 1 View    EXAM DATE:  8/2/2023 8:50 AM    CLINICAL HISTORY:  evaluate stool pattern; K59.04-Chronic idiopathic constipation;  R19.7-Diarrhea, unspecified    TECHNIQUE:  Frontal supine view of the abdomen/pelvis.    COMPARISON:  No relevant prior studies available.    FINDINGS:  GASTROINTESTINAL TRACT:  Scattered to moderate stool throughout the  colon.  No dilation.  BONES/JOINTS:  Unremarkable.    Impression  Scattered to moderate stool throughout the colon.    This report was finalized on 8/2/2023 9:13 AM by Dr. Pablo Del Castillo MD.       LABS (3 MONTHS):    Office Visit on 08/11/2023   Component Date Value Ref Range Status    Color 08/11/2023 Yellow  Yellow, Straw, Dark Yellow, Jennifer Final    Clarity, UA 08/11/2023 Clear  Clear Final    Specific Gravity  08/11/2023 1.015  1.005 - 1.030 Final    pH, Urine 08/11/2023 6.0  5.0 - 8.0 Final    Leukocytes 08/11/2023 Small (1+) (A)  Negative Final    Nitrite, UA 08/11/2023 Negative  Negative Final    Protein, POC 08/11/2023 Negative  Negative mg/dL Final    Glucose, UA 08/11/2023 Negative  Negative mg/dL Final    Ketones, UA 08/11/2023 Negative  Negative Final     Urobilinogen, UA 08/11/2023 Normal  Normal, 0.2 E.U./dL Final    Bilirubin 08/11/2023 Negative  Negative Final    Blood, UA 08/11/2023 1+ (A)  Negative Final    Lot Number 08/11/2023 [id number]   Final    Expiration Date 08/11/2023 102,024   Final    Urine Culture 08/11/2023 No growth   Final   Admission on 07/23/2023, Discharged on 07/23/2023   Component Date Value Ref Range Status    Glucose 07/23/2023 96  65 - 99 mg/dL Final    BUN 07/23/2023 14  6 - 20 mg/dL Final    Creatinine 07/23/2023 1.19 (H)  0.57 - 1.00 mg/dL Final    Sodium 07/23/2023 139  136 - 145 mmol/L Final    Potassium 07/23/2023 3.9  3.5 - 5.2 mmol/L Final    Chloride 07/23/2023 112 (H)  98 - 107 mmol/L Final    CO2 07/23/2023 19.7 (L)  22.0 - 29.0 mmol/L Final    Calcium 07/23/2023 8.8  8.6 - 10.5 mg/dL Final    Total Protein 07/23/2023 6.2  6.0 - 8.5 g/dL Final    Albumin 07/23/2023 3.5  3.5 - 5.2 g/dL Final    ALT (SGPT) 07/23/2023 17  1 - 33 U/L Final    AST (SGOT) 07/23/2023 35 (H)  1 - 32 U/L Final    Alkaline Phosphatase 07/23/2023 87  39 - 117 U/L Final    Total Bilirubin 07/23/2023 0.5  0.0 - 1.2 mg/dL Final    Globulin 07/23/2023 2.7  gm/dL Final    A/G Ratio 07/23/2023 1.3  g/dL Final    BUN/Creatinine Ratio 07/23/2023 11.8  7.0 - 25.0 Final    Anion Gap 07/23/2023 7.3  5.0 - 15.0 mmol/L Final    eGFR 07/23/2023 55.8 (L)  >60.0 mL/min/1.73 Final    Color, UA 07/23/2023 Yellow  Yellow, Straw Final    Appearance, UA 07/23/2023 Cloudy (A)  Clear Final    pH, UA 07/23/2023 5.5  5.0 - 8.0 Final    Specific Gravity, UA 07/23/2023 1.014  1.005 - 1.030 Final    Glucose, UA 07/23/2023 Negative  Negative Final    Ketones, UA 07/23/2023 Negative  Negative Final    Bilirubin, UA 07/23/2023 Negative  Negative Final    Blood, UA 07/23/2023 Moderate (2+) (A)  Negative Final    Protein, UA 07/23/2023 Negative  Negative Final    Leuk Esterase, UA 07/23/2023 Moderate (2+) (A)  Negative Final    Nitrite, UA 07/23/2023 Positive (A)  Negative Final     Urobilinogen, UA 07/23/2023 0.2 E.U./dL  0.2 - 1.0 E.U./dL Final    Lipase 07/23/2023 28  13 - 60 U/L Final    C-Reactive Protein 07/23/2023 0.63 (H)  0.00 - 0.50 mg/dL Final    Sed Rate 07/23/2023 10  0 - 20 mm/hr Final    Free T4 07/23/2023 0.91 (L)  0.93 - 1.70 ng/dL Final    TSH 07/23/2023 2.280  0.270 - 4.200 uIU/mL Final    Magnesium 07/23/2023 2.1  1.6 - 2.6 mg/dL Final    Creatine Kinase 07/23/2023 141  20 - 180 U/L Final    Campylobacter 07/23/2023 Not Detected  Not Detected Final    Plesiomonas shigelloides 07/23/2023 Not Detected  Not Detected Final    Salmonella 07/23/2023 Not Detected  Not Detected Final    Vibrio 07/23/2023 Not Detected  Not Detected Final    Vibrio cholerae 07/23/2023 Not Detected  Not Detected Final    Yersinia enterocolitica 07/23/2023 Not Detected  Not Detected Final    Enteroaggregative E. coli (EAEC) 07/23/2023 Not Detected  Not Detected Final    Enteropathogenic E. coli (EPEC) 07/23/2023 Detected (A)  Not Detected Final    Enterotoxigenic E. coli (ETEC) lt/* 07/23/2023 Not Detected  Not Detected Final    Shiga-like toxin-producing E. coli* 07/23/2023 Not Detected  Not Detected Final    Shigella/Enteroinvasive E. coli (E* 07/23/2023 Not Detected  Not Detected Final    Cryptosporidium 07/23/2023 Not Detected  Not Detected Final    Cyclospora cayetanensis 07/23/2023 Not Detected  Not Detected Final    Entamoeba histolytica 07/23/2023 Not Detected  Not Detected Final    Giardia lamblia 07/23/2023 Not Detected  Not Detected Final    Adenovirus F40/41 07/23/2023 Not Detected  Not Detected Final    Astrovirus 07/23/2023 Not Detected  Not Detected Final    Norovirus GI/GII 07/23/2023 Not Detected  Not Detected Final    Rotavirus A 07/23/2023 Not Detected  Not Detected Final    Sapovirus (I, II, IV or V) 07/23/2023 Not Detected  Not Detected Final    WBC 07/23/2023 8.54  3.40 - 10.80 10*3/mm3 Final    RBC 07/23/2023 4.42  3.77 - 5.28 10*6/mm3 Final    Hemoglobin 07/23/2023 13.0  12.0 -  15.9 g/dL Final    Hematocrit 07/23/2023 40.7  34.0 - 46.6 % Final    MCV 07/23/2023 92.1  79.0 - 97.0 fL Final    MCH 07/23/2023 29.4  26.6 - 33.0 pg Final    MCHC 07/23/2023 31.9  31.5 - 35.7 g/dL Final    RDW 07/23/2023 12.9  12.3 - 15.4 % Final    RDW-SD 07/23/2023 43.8  37.0 - 54.0 fl Final    MPV 07/23/2023 9.9  6.0 - 12.0 fL Final    Platelets 07/23/2023 264  140 - 450 10*3/mm3 Final    Neutrophil % 07/23/2023 57.3  42.7 - 76.0 % Final    Lymphocyte % 07/23/2023 30.6  19.6 - 45.3 % Final    Monocyte % 07/23/2023 8.0  5.0 - 12.0 % Final    Eosinophil % 07/23/2023 3.2  0.3 - 6.2 % Final    Basophil % 07/23/2023 0.4  0.0 - 1.5 % Final    Immature Grans % 07/23/2023 0.5  0.0 - 0.5 % Final    Neutrophils, Absolute 07/23/2023 4.91  1.70 - 7.00 10*3/mm3 Final    Lymphocytes, Absolute 07/23/2023 2.61  0.70 - 3.10 10*3/mm3 Final    Monocytes, Absolute 07/23/2023 0.68  0.10 - 0.90 10*3/mm3 Final    Eosinophils, Absolute 07/23/2023 0.27  0.00 - 0.40 10*3/mm3 Final    Basophils, Absolute 07/23/2023 0.03  0.00 - 0.20 10*3/mm3 Final    Immature Grans, Absolute 07/23/2023 0.04  0.00 - 0.05 10*3/mm3 Final    nRBC 07/23/2023 0.0  0.0 - 0.2 /100 WBC Final    Toxigenic C. difficile by PCR 07/23/2023 Negative  Negative Final    027 Toxin 07/23/2023 Presumptive Negative   Final    Extra Tube 07/23/2023 Hold for add-ons.   Final    Auto resulted.    Extra Tube 07/23/2023 hold for add-on   Final    Auto resulted    Extra Tube 07/23/2023 Hold for add-ons.   Final    Auto resulted.    Extra Tube 07/23/2023 Hold for add-ons.   Final    Auto resulted    RBC, UA 07/23/2023 6-12 (A)  None Seen, 0-2 /HPF Final    WBC, UA 07/23/2023 Too Numerous to Count (A)  None Seen, 0-2 /HPF Final    Bacteria, UA 07/23/2023 4+ (A)  None Seen /HPF Final    Squamous Epithelial Cells, UA 07/23/2023 3-6 (A)  None Seen, 0-2 /HPF Final    Hyaline Casts, UA 07/23/2023 3-6  None Seen /LPF Final    Methodology 07/23/2023 Automated Microscopy   Final    Urine  Culture 07/23/2023 >100,000 CFU/mL Escherichia coli (A)   Final   Admission on 07/18/2023, Discharged on 07/18/2023   Component Date Value Ref Range Status    Glucose 07/18/2023 119 (H)  65 - 99 mg/dL Final    BUN 07/18/2023 15  6 - 20 mg/dL Final    Creatinine 07/18/2023 1.25 (H)  0.57 - 1.00 mg/dL Final    Sodium 07/18/2023 142  136 - 145 mmol/L Final    Potassium 07/18/2023 4.3  3.5 - 5.2 mmol/L Final    Slight hemolysis detected by analyzer. Results may be affected.    Chloride 07/18/2023 109 (H)  98 - 107 mmol/L Final    CO2 07/18/2023 22.8  22.0 - 29.0 mmol/L Final    Calcium 07/18/2023 9.2  8.6 - 10.5 mg/dL Final    Total Protein 07/18/2023 6.6  6.0 - 8.5 g/dL Final    Albumin 07/18/2023 3.9  3.5 - 5.2 g/dL Final    ALT (SGPT) 07/18/2023 24  1 - 33 U/L Final    AST (SGOT) 07/18/2023 59 (H)  1 - 32 U/L Final    Alkaline Phosphatase 07/18/2023 100  39 - 117 U/L Final    Total Bilirubin 07/18/2023 0.6  0.0 - 1.2 mg/dL Final    Globulin 07/18/2023 2.7  gm/dL Final    A/G Ratio 07/18/2023 1.4  g/dL Final    BUN/Creatinine Ratio 07/18/2023 12.0  7.0 - 25.0 Final    Anion Gap 07/18/2023 10.2  5.0 - 15.0 mmol/L Final    eGFR 07/18/2023 52.6 (L)  >60.0 mL/min/1.73 Final    Lactate 07/18/2023 2.0  0.5 - 2.0 mmol/L Final    Color, UA 07/18/2023 Yellow  Yellow, Straw Final    Appearance, UA 07/18/2023 Hazy (A)  Clear Final    pH, UA 07/18/2023 5.5  5.0 - 8.0 Final    Specific Gravity, UA 07/18/2023 1.021  1.005 - 1.030 Final    Glucose, UA 07/18/2023 Negative  Negative Final    Ketones, UA 07/18/2023 Trace (A)  Negative Final    Bilirubin, UA 07/18/2023 Negative  Negative Final    Blood, UA 07/18/2023 Trace (A)  Negative Final    Protein, UA 07/18/2023 Negative  Negative Final    Leuk Esterase, UA 07/18/2023 Trace (A)  Negative Final    Nitrite, UA 07/18/2023 Negative  Negative Final    Urobilinogen, UA 07/18/2023 0.2 E.U./dL  0.2 - 1.0 E.U./dL Final    WBC 07/18/2023 9.83  3.40 - 10.80 10*3/mm3 Final    RBC 07/18/2023  4.57  3.77 - 5.28 10*6/mm3 Final    Hemoglobin 07/18/2023 13.5  12.0 - 15.9 g/dL Final    Hematocrit 07/18/2023 42.7  34.0 - 46.6 % Final    MCV 07/18/2023 93.4  79.0 - 97.0 fL Final    MCH 07/18/2023 29.5  26.6 - 33.0 pg Final    MCHC 07/18/2023 31.6  31.5 - 35.7 g/dL Final    RDW 07/18/2023 12.9  12.3 - 15.4 % Final    RDW-SD 07/18/2023 44.0  37.0 - 54.0 fl Final    MPV 07/18/2023 10.3  6.0 - 12.0 fL Final    Platelets 07/18/2023 319  140 - 450 10*3/mm3 Final    Neutrophil % 07/18/2023 59.5  42.7 - 76.0 % Final    Lymphocyte % 07/18/2023 32.0  19.6 - 45.3 % Final    Monocyte % 07/18/2023 6.4  5.0 - 12.0 % Final    Eosinophil % 07/18/2023 1.2  0.3 - 6.2 % Final    Basophil % 07/18/2023 0.5  0.0 - 1.5 % Final    Immature Grans % 07/18/2023 0.4  0.0 - 0.5 % Final    Neutrophils, Absolute 07/18/2023 5.84  1.70 - 7.00 10*3/mm3 Final    Lymphocytes, Absolute 07/18/2023 3.15 (H)  0.70 - 3.10 10*3/mm3 Final    Monocytes, Absolute 07/18/2023 0.63  0.10 - 0.90 10*3/mm3 Final    Eosinophils, Absolute 07/18/2023 0.12  0.00 - 0.40 10*3/mm3 Final    Basophils, Absolute 07/18/2023 0.05  0.00 - 0.20 10*3/mm3 Final    Immature Grans, Absolute 07/18/2023 0.04  0.00 - 0.05 10*3/mm3 Final    nRBC 07/18/2023 0.0  0.0 - 0.2 /100 WBC Final    Extra Tube 07/18/2023 Hold for add-ons.   Final    Auto resulted.    Extra Tube 07/18/2023 hold for add-on   Final    Auto resulted    Extra Tube 07/18/2023 Hold for add-ons.   Final    Auto resulted.    Extra Tube 07/18/2023 Hold for add-ons.   Final    Auto resulted    RBC, UA 07/18/2023 0-2  None Seen, 0-2 /HPF Final    WBC, UA 07/18/2023 0-2  None Seen, 0-2 /HPF Final    Bacteria, UA 07/18/2023 2+ (A)  None Seen /HPF Final    Squamous Epithelial Cells, UA 07/18/2023 3-6 (A)  None Seen, 0-2 /HPF Final    Hyaline Casts, UA 07/18/2023 None Seen  None Seen /LPF Final    Methodology 07/18/2023 Manual Light Microscopy   Final   Admission on 06/24/2023, Discharged on 06/24/2023   Component Date Value  Ref Range Status    Glucose 06/24/2023 136 (H)  65 - 99 mg/dL Final    BUN 06/24/2023 13  6 - 20 mg/dL Final    Creatinine 06/24/2023 1.02 (H)  0.57 - 1.00 mg/dL Final    Sodium 06/24/2023 142  136 - 145 mmol/L Final    Potassium 06/24/2023 4.2  3.5 - 5.2 mmol/L Final    Chloride 06/24/2023 109 (H)  98 - 107 mmol/L Final    CO2 06/24/2023 19.1 (L)  22.0 - 29.0 mmol/L Final    Calcium 06/24/2023 9.1  8.6 - 10.5 mg/dL Final    Total Protein 06/24/2023 6.9  6.0 - 8.5 g/dL Final    Albumin 06/24/2023 4.0  3.5 - 5.2 g/dL Final    ALT (SGPT) 06/24/2023 24  1 - 33 U/L Final    AST (SGOT) 06/24/2023 49 (H)  1 - 32 U/L Final    Alkaline Phosphatase 06/24/2023 95  39 - 117 U/L Final    Total Bilirubin 06/24/2023 0.4  0.0 - 1.2 mg/dL Final    Globulin 06/24/2023 2.9  gm/dL Final    A/G Ratio 06/24/2023 1.4  g/dL Final    BUN/Creatinine Ratio 06/24/2023 12.7  7.0 - 25.0 Final    Anion Gap 06/24/2023 13.9  5.0 - 15.0 mmol/L Final    eGFR 06/24/2023 67.2  >60.0 mL/min/1.73 Final    Lipase 06/24/2023 28  13 - 60 U/L Final    Lactate 06/24/2023 1.8  0.5 - 2.0 mmol/L Final    Protime 06/24/2023 13.3  12.1 - 14.7 Seconds Final    INR 06/24/2023 0.96  0.90 - 1.10 Final    Color, UA 06/24/2023 Yellow  Yellow, Straw Final    Appearance, UA 06/24/2023 Turbid (A)  Clear Final    pH, UA 06/24/2023 5.5  5.0 - 8.0 Final    Specific Gravity, UA 06/24/2023 1.012  1.005 - 1.030 Final    Glucose, UA 06/24/2023 Negative  Negative Final    Ketones, UA 06/24/2023 Negative  Negative Final    Bilirubin, UA 06/24/2023 Negative  Negative Final    Blood, UA 06/24/2023 Small (1+) (A)  Negative Final    Protein, UA 06/24/2023 Negative  Negative Final    Leuk Esterase, UA 06/24/2023 Moderate (2+) (A)  Negative Final    Nitrite, UA 06/24/2023 Positive (A)  Negative Final    Urobilinogen, UA 06/24/2023 0.2 E.U./dL  0.2 - 1.0 E.U./dL Final    THC, Screen, Urine 06/24/2023 Negative  Negative Final    Phencyclidine (PCP), Urine 06/24/2023 Negative  Negative  Final    Cocaine Screen, Urine 06/24/2023 Negative  Negative Final    Methamphetamine, Ur 06/24/2023 Negative  Negative Final    Opiate Screen 06/24/2023 Positive (A)  Negative Final    Amphetamine Screen, Urine 06/24/2023 Negative  Negative Final    Benzodiazepine Screen, Urine 06/24/2023 Negative  Negative Final    Tricyclic Antidepressants Screen 06/24/2023 Negative  Negative Final    Methadone Screen, Urine 06/24/2023 Negative  Negative Final    Barbiturates Screen, Urine 06/24/2023 Negative  Negative Final    Oxycodone Screen, Urine 06/24/2023 Negative  Negative Final    Propoxyphene Screen 06/24/2023 Negative  Negative Final    Buprenorphine, Screen, Urine 06/24/2023 Negative  Negative Final    Creatine Kinase 06/24/2023 83  20 - 180 U/L Final    QT Interval 06/24/2023 418  ms Final    QTC Interval 06/24/2023 485  ms Final    HS Troponin T 06/24/2023 <6  <10 ng/L Final    Extra Tube 06/24/2023 Hold for add-ons.   Final    Auto resulted.    Extra Tube 06/24/2023 hold for add-on   Final    Auto resulted    Extra Tube 06/24/2023 Hold for add-ons.   Final    Auto resulted.    Extra Tube 06/24/2023 Hold for add-ons.   Final    Auto resulted    WBC 06/24/2023 10.56  3.40 - 10.80 10*3/mm3 Final    RBC 06/24/2023 4.94  3.77 - 5.28 10*6/mm3 Final    Hemoglobin 06/24/2023 14.4  12.0 - 15.9 g/dL Final    Hematocrit 06/24/2023 44.8  34.0 - 46.6 % Final    MCV 06/24/2023 90.7  79.0 - 97.0 fL Final    MCH 06/24/2023 29.1  26.6 - 33.0 pg Final    MCHC 06/24/2023 32.1  31.5 - 35.7 g/dL Final    RDW 06/24/2023 12.9  12.3 - 15.4 % Final    RDW-SD 06/24/2023 42.4  37.0 - 54.0 fl Final    MPV 06/24/2023 9.9  6.0 - 12.0 fL Final    Platelets 06/24/2023 285  140 - 450 10*3/mm3 Final    Neutrophil % 06/24/2023 61.0  42.7 - 76.0 % Final    Lymphocyte % 06/24/2023 26.4  19.6 - 45.3 % Final    Monocyte % 06/24/2023 7.3  5.0 - 12.0 % Final    Eosinophil % 06/24/2023 3.7  0.3 - 6.2 % Final    Basophil % 06/24/2023 0.8  0.0 - 1.5 % Final     Immature Grans % 06/24/2023 0.8 (H)  0.0 - 0.5 % Final    Neutrophils, Absolute 06/24/2023 6.45  1.70 - 7.00 10*3/mm3 Final    Lymphocytes, Absolute 06/24/2023 2.79  0.70 - 3.10 10*3/mm3 Final    Monocytes, Absolute 06/24/2023 0.77  0.10 - 0.90 10*3/mm3 Final    Eosinophils, Absolute 06/24/2023 0.39  0.00 - 0.40 10*3/mm3 Final    Basophils, Absolute 06/24/2023 0.08  0.00 - 0.20 10*3/mm3 Final    Immature Grans, Absolute 06/24/2023 0.08 (H)  0.00 - 0.05 10*3/mm3 Final    nRBC 06/24/2023 0.0  0.0 - 0.2 /100 WBC Final    Blood Culture 06/24/2023 No growth at 5 days   Final    Blood Culture 06/24/2023 No growth at 5 days   Final    HS Troponin T 06/24/2023 <6  <10 ng/L Final    Troponin T Delta 06/24/2023    Final    Unable to calculate.    Fentanyl, Urine 06/24/2023 Negative  Negative Final    RBC, UA 06/24/2023 3-5 (A)  None Seen, 0-2 /HPF Final    WBC, UA 06/24/2023 21-30 (A)  None Seen, 0-2 /HPF Final    Bacteria, UA 06/24/2023 3+ (A)  None Seen /HPF Final    Squamous Epithelial Cells, UA 06/24/2023 21-30 (A)  None Seen, 0-2 /HPF Final    Hyaline Casts, UA 06/24/2023 None Seen  None Seen /LPF Final    Methodology 06/24/2023 Manual Light Microscopy   Final    Urine Culture 06/24/2023 >100,000 CFU/mL Mixed Belkis Isolated   Final        Smoking Cessation Counseling:  Former smoker.  Patient does not currently use any tobacco products.     1. Acute cystitis with hematuria  - A prescription for Macrobid was sent to the patient's pharmacy.  - A prescription for Diflucan was sent to the patient's pharmacy.  - A prescription for Zofran was sent to the patient's pharmacy.    2. Dysuria  - A prescription for trimethoprim was sent to the patient's pharmacy.    3. Nausea  - A prescription for Zofran sent to the patient's pharmacy.    The patient will follow up in 2 weeks.    Follow Up   Return in about 5 weeks (around 9/18/2023) for Next scheduled follow up, RECURRENT UTI/DYSURIA/DETRUSSOR INSTABILITY, GROSS/MICRO  HEMATURIA-BOTOX .    Patient was given instructions and counseling regarding her condition or for health maintenance advice. Please see specific information pulled into the AVS if appropriate.          This document has been electronically signed by Griselda Cheng-Akwa, APRN   August 15, 2023 23:22 EDT      Dictated Utilizing Dragon Dictation: Part of this note may be an electronic transcription/translation of spoken language to printed text using the Dragon Dictation System.    Transcribed from ambient dictation for Griselda Cheng-Akwa, APRN by Geena Lozano.  08/11/23   14:53 EDT    Patient or patient representative verbalized consent to the visit recording.  I have personally performed the services described in this document as transcribed by the above individual, and it is both accurate and complete.

## 2023-08-12 LAB — BACTERIA SPEC AEROBE CULT: NO GROWTH

## 2023-08-14 PROBLEM — Z12.11 ENCOUNTER FOR SCREENING FOR MALIGNANT NEOPLASM OF COLON: Status: ACTIVE | Noted: 2023-08-14

## 2023-08-14 PROBLEM — K21.9 GASTROESOPHAGEAL REFLUX DISEASE WITHOUT ESOPHAGITIS: Status: ACTIVE | Noted: 2023-08-14

## 2023-08-14 PROBLEM — K63.5 POLYP OF COLON: Status: ACTIVE | Noted: 2023-08-14

## 2023-08-14 PROBLEM — K59.04 CHRONIC IDIOPATHIC CONSTIPATION: Status: ACTIVE | Noted: 2023-08-14

## 2023-08-14 PROBLEM — R11.2 NAUSEA AND VOMITING: Status: ACTIVE | Noted: 2023-08-14

## 2023-08-14 PROBLEM — R10.13 EPIGASTRIC PAIN: Status: ACTIVE | Noted: 2023-08-14

## 2023-08-16 ENCOUNTER — TELEPHONE (OUTPATIENT)
Dept: UROLOGY | Facility: CLINIC | Age: 53
End: 2023-08-16
Payer: COMMERCIAL

## 2023-08-16 NOTE — TELEPHONE ENCOUNTER
I left the patient a voicemail letting her know that her urine culture was negative for any bacterial growth and to follow up in the clinic as discussed.

## 2023-08-30 ENCOUNTER — ANESTHESIA EVENT (OUTPATIENT)
Dept: PERIOP | Facility: HOSPITAL | Age: 53
End: 2023-08-30
Payer: COMMERCIAL

## 2023-08-30 DIAGNOSIS — Z12.11 ENCOUNTER FOR SCREENING FOR MALIGNANT NEOPLASM OF COLON: ICD-10-CM

## 2023-08-31 ENCOUNTER — ANESTHESIA (OUTPATIENT)
Dept: PERIOP | Facility: HOSPITAL | Age: 53
End: 2023-08-31
Payer: COMMERCIAL

## 2023-08-31 ENCOUNTER — HOSPITAL ENCOUNTER (OUTPATIENT)
Facility: HOSPITAL | Age: 53
Setting detail: HOSPITAL OUTPATIENT SURGERY
Discharge: HOME | End: 2023-08-31
Attending: INTERNAL MEDICINE | Admitting: INTERNAL MEDICINE
Payer: COMMERCIAL

## 2023-08-31 VITALS
HEART RATE: 56 BPM | OXYGEN SATURATION: 100 % | DIASTOLIC BLOOD PRESSURE: 60 MMHG | HEIGHT: 67 IN | BODY MASS INDEX: 41.44 KG/M2 | SYSTOLIC BLOOD PRESSURE: 113 MMHG | WEIGHT: 264 LBS | TEMPERATURE: 97.3 F | RESPIRATION RATE: 18 BRPM

## 2023-08-31 DIAGNOSIS — R11.2 NAUSEA AND VOMITING, UNSPECIFIED VOMITING TYPE: ICD-10-CM

## 2023-08-31 DIAGNOSIS — K63.5 POLYP OF COLON, UNSPECIFIED PART OF COLON, UNSPECIFIED TYPE: ICD-10-CM

## 2023-08-31 DIAGNOSIS — K21.9 GASTROESOPHAGEAL REFLUX DISEASE WITHOUT ESOPHAGITIS: ICD-10-CM

## 2023-08-31 DIAGNOSIS — Z12.11 ENCOUNTER FOR SCREENING FOR MALIGNANT NEOPLASM OF COLON: ICD-10-CM

## 2023-08-31 DIAGNOSIS — R10.13 EPIGASTRIC PAIN: ICD-10-CM

## 2023-08-31 DIAGNOSIS — K59.04 CHRONIC IDIOPATHIC CONSTIPATION: ICD-10-CM

## 2023-08-31 PROCEDURE — 43239 EGD BIOPSY SINGLE/MULTIPLE: CPT | Performed by: INTERNAL MEDICINE

## 2023-08-31 PROCEDURE — 45385 COLONOSCOPY W/LESION REMOVAL: CPT | Performed by: INTERNAL MEDICINE

## 2023-08-31 PROCEDURE — 25010000002 PROPOFOL 200 MG/20ML EMULSION: Performed by: NURSE ANESTHETIST, CERTIFIED REGISTERED

## 2023-08-31 RX ORDER — ONDANSETRON 2 MG/ML
4 INJECTION INTRAMUSCULAR; INTRAVENOUS AS NEEDED
Status: DISCONTINUED | OUTPATIENT
Start: 2023-08-31 | End: 2023-08-31 | Stop reason: HOSPADM

## 2023-08-31 RX ORDER — KETOROLAC TROMETHAMINE 30 MG/ML
30 INJECTION, SOLUTION INTRAMUSCULAR; INTRAVENOUS EVERY 6 HOURS PRN
Status: DISCONTINUED | OUTPATIENT
Start: 2023-08-31 | End: 2023-08-31 | Stop reason: HOSPADM

## 2023-08-31 RX ORDER — SODIUM CHLORIDE 0.9 % (FLUSH) 0.9 %
10 SYRINGE (ML) INJECTION AS NEEDED
Status: DISCONTINUED | OUTPATIENT
Start: 2023-08-31 | End: 2023-08-31 | Stop reason: HOSPADM

## 2023-08-31 RX ORDER — PROPOFOL 10 MG/ML
INJECTION, EMULSION INTRAVENOUS AS NEEDED
Status: DISCONTINUED | OUTPATIENT
Start: 2023-08-31 | End: 2023-08-31 | Stop reason: SURG

## 2023-08-31 RX ORDER — SODIUM CHLORIDE 9 MG/ML
40 INJECTION, SOLUTION INTRAVENOUS AS NEEDED
Status: DISCONTINUED | OUTPATIENT
Start: 2023-08-31 | End: 2023-08-31 | Stop reason: HOSPADM

## 2023-08-31 RX ORDER — MEPERIDINE HYDROCHLORIDE 25 MG/ML
12.5 INJECTION INTRAMUSCULAR; INTRAVENOUS; SUBCUTANEOUS
Status: DISCONTINUED | OUTPATIENT
Start: 2023-08-31 | End: 2023-08-31 | Stop reason: HOSPADM

## 2023-08-31 RX ORDER — MIDAZOLAM HYDROCHLORIDE 1 MG/ML
1 INJECTION INTRAMUSCULAR; INTRAVENOUS
Status: DISCONTINUED | OUTPATIENT
Start: 2023-08-31 | End: 2023-08-31 | Stop reason: HOSPADM

## 2023-08-31 RX ORDER — IPRATROPIUM BROMIDE AND ALBUTEROL SULFATE 2.5; .5 MG/3ML; MG/3ML
3 SOLUTION RESPIRATORY (INHALATION) ONCE AS NEEDED
Status: DISCONTINUED | OUTPATIENT
Start: 2023-08-31 | End: 2023-08-31 | Stop reason: HOSPADM

## 2023-08-31 RX ORDER — OXYCODONE HYDROCHLORIDE AND ACETAMINOPHEN 5; 325 MG/1; MG/1
1 TABLET ORAL ONCE AS NEEDED
Status: DISCONTINUED | OUTPATIENT
Start: 2023-08-31 | End: 2023-08-31 | Stop reason: HOSPADM

## 2023-08-31 RX ORDER — POLYETHYLENE GLYCOL 3350 17 G/17G
POWDER, FOR SOLUTION ORAL
Qty: 510 G | Refills: 0 | OUTPATIENT
Start: 2023-08-31 | End: 2023-08-31 | Stop reason: HOSPADM

## 2023-08-31 RX ORDER — FENTANYL CITRATE 50 UG/ML
50 INJECTION, SOLUTION INTRAMUSCULAR; INTRAVENOUS
Status: DISCONTINUED | OUTPATIENT
Start: 2023-08-31 | End: 2023-08-31 | Stop reason: HOSPADM

## 2023-08-31 RX ORDER — SODIUM CHLORIDE, SODIUM LACTATE, POTASSIUM CHLORIDE, CALCIUM CHLORIDE 600; 310; 30; 20 MG/100ML; MG/100ML; MG/100ML; MG/100ML
100 INJECTION, SOLUTION INTRAVENOUS ONCE AS NEEDED
Status: DISCONTINUED | OUTPATIENT
Start: 2023-08-31 | End: 2023-08-31 | Stop reason: HOSPADM

## 2023-08-31 RX ORDER — SODIUM CHLORIDE, SODIUM LACTATE, POTASSIUM CHLORIDE, CALCIUM CHLORIDE 600; 310; 30; 20 MG/100ML; MG/100ML; MG/100ML; MG/100ML
125 INJECTION, SOLUTION INTRAVENOUS ONCE
Status: COMPLETED | OUTPATIENT
Start: 2023-08-31 | End: 2023-08-31

## 2023-08-31 RX ORDER — SODIUM CHLORIDE 0.9 % (FLUSH) 0.9 %
10 SYRINGE (ML) INJECTION EVERY 12 HOURS SCHEDULED
Status: DISCONTINUED | OUTPATIENT
Start: 2023-08-31 | End: 2023-08-31 | Stop reason: HOSPADM

## 2023-08-31 RX ORDER — LIDOCAINE HYDROCHLORIDE 20 MG/ML
INJECTION, SOLUTION EPIDURAL; INFILTRATION; INTRACAUDAL; PERINEURAL AS NEEDED
Status: DISCONTINUED | OUTPATIENT
Start: 2023-08-31 | End: 2023-08-31 | Stop reason: SURG

## 2023-08-31 RX ADMIN — PROPOFOL 50 MG: 10 INJECTION, EMULSION INTRAVENOUS at 10:09

## 2023-08-31 RX ADMIN — LIDOCAINE HYDROCHLORIDE 100 MG: 20 INJECTION, SOLUTION EPIDURAL; INFILTRATION; INTRACAUDAL; PERINEURAL at 10:00

## 2023-08-31 RX ADMIN — PROPOFOL 100 MG: 10 INJECTION, EMULSION INTRAVENOUS at 10:00

## 2023-08-31 RX ADMIN — SODIUM CHLORIDE, POTASSIUM CHLORIDE, SODIUM LACTATE AND CALCIUM CHLORIDE: 600; 310; 30; 20 INJECTION, SOLUTION INTRAVENOUS at 09:59

## 2023-08-31 RX ADMIN — PROPOFOL 50 MG: 10 INJECTION, EMULSION INTRAVENOUS at 10:02

## 2023-08-31 RX ADMIN — PROPOFOL 50 MG: 10 INJECTION, EMULSION INTRAVENOUS at 10:15

## 2023-08-31 RX ADMIN — PROPOFOL 50 MG: 10 INJECTION, EMULSION INTRAVENOUS at 10:06

## 2023-08-31 NOTE — ANESTHESIA POSTPROCEDURE EVALUATION
Patient: Briseyda Edouard    Procedure Summary       Date: 08/31/23 Room / Location:  [facility]  [facility]    Anesthesia Start: 0959 Anesthesia Stop: 1036    Procedures:       ESOPHAGOGASTRODUODENOSCOPY WITH BIOPSY (Esophagus)      COLONOSCOPY FOR SCREENING Diagnosis:       Chronic idiopathic constipation      Gastroesophageal reflux disease without esophagitis      Encounter for screening for malignant neoplasm of colon      Epigastric pain      Nausea and vomiting, unspecified vomiting type      Polyp of colon, unspecified part of colon, unspecified type      (Chronic idiopathic constipation [K59.04])      (Gastroesophageal reflux disease without esophagitis [K21.9])      (Encounter for screening for malignant neoplasm of colon [Z12.11])      (Epigastric pain [R10.13])      (Nausea and vomiting, unspecified vomiting type [R11.2])      (Polyp of colon, unspecified part of colon, unspecified type [K63.5])    Surgeons: Enid Aviles MD Provider: Bud Dowling DO    Anesthesia Type: general ASA Status: 3            Anesthesia Type: general    Vitals  Vitals Value Taken Time   /60 08/31/23 1108   Temp 97.3 øF (36.3 øC) 08/31/23 1038   Pulse 56 08/31/23 1108   Resp 18 08/31/23 1108   SpO2 100 % 08/31/23 1108           Post Anesthesia Care and Evaluation    Patient location during evaluation: PHASE II  Patient participation: complete - patient participated  Level of consciousness: awake and alert  Pain score: 0  Pain management: adequate    Airway patency: patent  Anesthetic complications: No anesthetic complications  PONV Status: controlled  Cardiovascular status: acceptable  Respiratory status: acceptable and room air  Hydration status: euvolemic  No anesthesia care post op

## 2023-08-31 NOTE — ANESTHESIA PREPROCEDURE EVALUATION
Anesthesia Evaluation     Patient summary reviewed and Nursing notes reviewed   history of anesthetic complications:  PONV  NPO Solid Status: > 8 hours  NPO Liquid Status: > 8 hours           Airway   Mallampati: III  TM distance: >3 FB  Neck ROM: full  No difficulty expected  Dental - normal exam     Pulmonary - negative pulmonary ROS and normal exam    breath sounds clear to auscultation  (+) pneumonia , a smoker (vape) Current Smoked day of surgery,  Cardiovascular - negative cardio ROS and normal exam    ECG reviewed  Rhythm: regular  Rate: normal    (+) hypertension      Neuro/Psych- negative ROS  (+) psychiatric history  GI/Hepatic/Renal/Endo - negative ROS   (+) obesity, morbid obesity (class III morbid obesity), hiatal hernia, GERD, liver disease fatty liver disease, renal disease stones, diabetes mellitus    Musculoskeletal     (+) back pain  Abdominal  - normal exam  (+) obese   Substance History - negative use     OB/GYN negative ob/gyn ROS         Other - negative ROS  arthritis,   history of cancer                  Anesthesia Plan    ASA 3     general   total IV anesthesia    Anesthetic plan, risks, benefits, and alternatives have been provided, discussed and informed consent has been obtained with: patient.  Pre-procedure education provided  Plan discussed with CRNA.    CODE STATUS:

## 2023-09-01 LAB — REF LAB TEST METHOD: NORMAL

## 2023-09-05 NOTE — PROGRESS NOTES
At the time of your recent upper endoscopy, biopsies were taken of the esophagus.  Biopsies revealed chronic inflammation secondary to acid reflux.  Biopsies of the stomach were negative for H. pylori gastritis.  A polyp was removed from the colon.  The polyp was a tubular adenoma.  Tubular adenomas are considered precancerous.  Because of this, you will need repeat colonoscopy in 5 years.

## 2023-09-14 ENCOUNTER — HOSPITAL ENCOUNTER (EMERGENCY)
Facility: HOSPITAL | Age: 53
Discharge: HOME | End: 2023-09-14
Attending: EMERGENCY MEDICINE
Payer: COMMERCIAL

## 2023-09-14 ENCOUNTER — APPOINTMENT (OUTPATIENT)
Dept: GENERAL RADIOLOGY | Facility: HOSPITAL | Age: 53
End: 2023-09-14
Payer: COMMERCIAL

## 2023-09-14 ENCOUNTER — APPOINTMENT (OUTPATIENT)
Dept: CT IMAGING | Facility: HOSPITAL | Age: 53
End: 2023-09-14
Payer: COMMERCIAL

## 2023-09-14 VITALS
WEIGHT: 265 LBS | OXYGEN SATURATION: 97 % | RESPIRATION RATE: 20 BRPM | HEART RATE: 67 BPM | SYSTOLIC BLOOD PRESSURE: 104 MMHG | TEMPERATURE: 98.9 F | BODY MASS INDEX: 41.59 KG/M2 | DIASTOLIC BLOOD PRESSURE: 52 MMHG | HEIGHT: 67 IN

## 2023-09-14 DIAGNOSIS — K59.00 CONSTIPATION, UNSPECIFIED CONSTIPATION TYPE: ICD-10-CM

## 2023-09-14 DIAGNOSIS — K44.9 HIATAL HERNIA: ICD-10-CM

## 2023-09-14 DIAGNOSIS — R07.9 NONSPECIFIC CHEST PAIN: Primary | ICD-10-CM

## 2023-09-14 LAB
ALBUMIN SERPL-MCNC: 3.9 G/DL (ref 3.5–5.2)
ALBUMIN/GLOB SERPL: 1.3 G/DL
ALP SERPL-CCNC: 223 U/L (ref 39–117)
ALT SERPL W P-5'-P-CCNC: 40 U/L (ref 1–33)
ANION GAP SERPL CALCULATED.3IONS-SCNC: 10.4 MMOL/L (ref 5–15)
AST SERPL-CCNC: 90 U/L (ref 1–32)
BASOPHILS # BLD AUTO: 0.04 10*3/MM3 (ref 0–0.2)
BASOPHILS NFR BLD AUTO: 0.4 % (ref 0–1.5)
BILIRUB SERPL-MCNC: 0.5 MG/DL (ref 0–1.2)
BUN SERPL-MCNC: 21 MG/DL (ref 6–20)
BUN/CREAT SERPL: 17.1 (ref 7–25)
CALCIUM SPEC-SCNC: 9.4 MG/DL (ref 8.6–10.5)
CHLORIDE SERPL-SCNC: 107 MMOL/L (ref 98–107)
CO2 SERPL-SCNC: 20.6 MMOL/L (ref 22–29)
CREAT SERPL-MCNC: 1.23 MG/DL (ref 0.57–1)
D DIMER PPP FEU-MCNC: 1.33 MCGFEU/ML (ref 0–0.51)
DEPRECATED RDW RBC AUTO: 44 FL (ref 37–54)
EGFRCR SERPLBLD CKD-EPI 2021: 53.3 ML/MIN/1.73
EOSINOPHIL # BLD AUTO: 0.17 10*3/MM3 (ref 0–0.4)
EOSINOPHIL NFR BLD AUTO: 1.8 % (ref 0.3–6.2)
ERYTHROCYTE [DISTWIDTH] IN BLOOD BY AUTOMATED COUNT: 12.8 % (ref 12.3–15.4)
GEN 5 2HR TROPONIN T REFLEX: <6 NG/L
GLOBULIN UR ELPH-MCNC: 3.1 GM/DL
GLUCOSE SERPL-MCNC: 96 MG/DL (ref 65–99)
HCT VFR BLD AUTO: 47.4 % (ref 34–46.6)
HGB BLD-MCNC: 14.4 G/DL (ref 12–15.9)
HOLD SPECIMEN: NORMAL
HOLD SPECIMEN: NORMAL
IMM GRANULOCYTES # BLD AUTO: 0.04 10*3/MM3 (ref 0–0.05)
IMM GRANULOCYTES NFR BLD AUTO: 0.4 % (ref 0–0.5)
LIPASE SERPL-CCNC: 39 U/L (ref 13–60)
LYMPHOCYTES # BLD AUTO: 2.16 10*3/MM3 (ref 0.7–3.1)
LYMPHOCYTES NFR BLD AUTO: 23.5 % (ref 19.6–45.3)
MCH RBC QN AUTO: 28.7 PG (ref 26.6–33)
MCHC RBC AUTO-ENTMCNC: 30.4 G/DL (ref 31.5–35.7)
MCV RBC AUTO: 94.6 FL (ref 79–97)
MONOCYTES # BLD AUTO: 0.72 10*3/MM3 (ref 0.1–0.9)
MONOCYTES NFR BLD AUTO: 7.8 % (ref 5–12)
NEUTROPHILS NFR BLD AUTO: 6.06 10*3/MM3 (ref 1.7–7)
NEUTROPHILS NFR BLD AUTO: 66.1 % (ref 42.7–76)
NRBC BLD AUTO-RTO: 0 /100 WBC (ref 0–0.2)
PLATELET # BLD AUTO: 322 10*3/MM3 (ref 140–450)
PMV BLD AUTO: 9.7 FL (ref 6–12)
POTASSIUM SERPL-SCNC: 4.1 MMOL/L (ref 3.5–5.2)
PROT SERPL-MCNC: 7 G/DL (ref 6–8.5)
QT INTERVAL: 384 MS
QTC INTERVAL: 442 MS
RBC # BLD AUTO: 5.01 10*6/MM3 (ref 3.77–5.28)
SODIUM SERPL-SCNC: 138 MMOL/L (ref 136–145)
TROPONIN T DELTA: NORMAL
TROPONIN T SERPL HS-MCNC: <6 NG/L
WBC NRBC COR # BLD: 9.19 10*3/MM3 (ref 3.4–10.8)
WHOLE BLOOD HOLD COAG: NORMAL
WHOLE BLOOD HOLD SPECIMEN: NORMAL

## 2023-09-14 PROCEDURE — 36415 COLL VENOUS BLD VENIPUNCTURE: CPT

## 2023-09-14 PROCEDURE — 74176 CT ABD & PELVIS W/O CONTRAST: CPT

## 2023-09-14 PROCEDURE — 25010000002 KETOROLAC TROMETHAMINE PER 15 MG: Performed by: EMERGENCY MEDICINE

## 2023-09-14 PROCEDURE — 85025 COMPLETE CBC W/AUTO DIFF WBC: CPT | Performed by: EMERGENCY MEDICINE

## 2023-09-14 PROCEDURE — 83690 ASSAY OF LIPASE: CPT | Performed by: EMERGENCY MEDICINE

## 2023-09-14 PROCEDURE — 93005 ELECTROCARDIOGRAM TRACING: CPT | Performed by: EMERGENCY MEDICINE

## 2023-09-14 PROCEDURE — 80053 COMPREHEN METABOLIC PANEL: CPT | Performed by: EMERGENCY MEDICINE

## 2023-09-14 PROCEDURE — 99285 EMERGENCY DEPT VISIT HI MDM: CPT

## 2023-09-14 PROCEDURE — 25510000001 IOPAMIDOL PER 1 ML: Performed by: EMERGENCY MEDICINE

## 2023-09-14 PROCEDURE — 96374 THER/PROPH/DIAG INJ IV PUSH: CPT

## 2023-09-14 PROCEDURE — 71275 CT ANGIOGRAPHY CHEST: CPT

## 2023-09-14 PROCEDURE — 85379 FIBRIN DEGRADATION QUANT: CPT | Performed by: EMERGENCY MEDICINE

## 2023-09-14 PROCEDURE — 84484 ASSAY OF TROPONIN QUANT: CPT | Performed by: EMERGENCY MEDICINE

## 2023-09-14 PROCEDURE — 71045 X-RAY EXAM CHEST 1 VIEW: CPT

## 2023-09-14 PROCEDURE — 93010 ELECTROCARDIOGRAM REPORT: CPT | Performed by: SPECIALIST

## 2023-09-14 RX ORDER — SODIUM CHLORIDE 0.9 % (FLUSH) 0.9 %
10 SYRINGE (ML) INJECTION AS NEEDED
Status: DISCONTINUED | OUTPATIENT
Start: 2023-09-14 | End: 2023-09-14 | Stop reason: HOSPADM

## 2023-09-14 RX ORDER — ASPIRIN 81 MG/1
324 TABLET, CHEWABLE ORAL ONCE
Status: COMPLETED | OUTPATIENT
Start: 2023-09-14 | End: 2023-09-14

## 2023-09-14 RX ORDER — SUCRALFATE ORAL 1 G/10ML
1 SUSPENSION ORAL 2 TIMES DAILY
Qty: 400 ML | Refills: 0 | Status: SHIPPED | OUTPATIENT
Start: 2023-09-14 | End: 2023-10-04

## 2023-09-14 RX ORDER — KETOROLAC TROMETHAMINE 30 MG/ML
30 INJECTION, SOLUTION INTRAMUSCULAR; INTRAVENOUS ONCE
Status: COMPLETED | OUTPATIENT
Start: 2023-09-14 | End: 2023-09-14

## 2023-09-14 RX ORDER — PANTOPRAZOLE SODIUM 40 MG/1
40 TABLET, DELAYED RELEASE ORAL DAILY
Qty: 30 TABLET | Refills: 0 | Status: SHIPPED | OUTPATIENT
Start: 2023-09-14

## 2023-09-14 RX ORDER — ASPIRIN 81 MG/1
324 TABLET, CHEWABLE ORAL ONCE
Status: DISCONTINUED | OUTPATIENT
Start: 2023-09-14 | End: 2023-09-14

## 2023-09-14 RX ORDER — ALUMINA, MAGNESIA, AND SIMETHICONE 2400; 2400; 240 MG/30ML; MG/30ML; MG/30ML
15 SUSPENSION ORAL ONCE
Status: COMPLETED | OUTPATIENT
Start: 2023-09-14 | End: 2023-09-14

## 2023-09-14 RX ORDER — PHENOBARBITAL, HYOSCYAMINE SULFATE, ATROPINE SULFATE AND SCOPOLAMINE HYDROBROMIDE .0194; .1037; 16.2; .0065 MG/1; MG/1; MG/1; MG/1
2 TABLET ORAL ONCE
Status: COMPLETED | OUTPATIENT
Start: 2023-09-14 | End: 2023-09-14

## 2023-09-14 RX ORDER — LIDOCAINE HYDROCHLORIDE 20 MG/ML
15 SOLUTION OROPHARYNGEAL ONCE
Status: COMPLETED | OUTPATIENT
Start: 2023-09-14 | End: 2023-09-14

## 2023-09-14 RX ORDER — SULFAMETHOXAZOLE AND TRIMETHOPRIM 800; 160 MG/1; MG/1
1 TABLET ORAL 2 TIMES DAILY
COMMUNITY

## 2023-09-14 RX ADMIN — KETOROLAC TROMETHAMINE 30 MG: 30 INJECTION, SOLUTION INTRAMUSCULAR at 10:25

## 2023-09-14 RX ADMIN — ASPIRIN 324 MG: 81 TABLET, CHEWABLE ORAL at 09:07

## 2023-09-14 RX ADMIN — LIDOCAINE HYDROCHLORIDE 15 ML: 20 SOLUTION ORAL; TOPICAL at 10:25

## 2023-09-14 RX ADMIN — IOPAMIDOL 100 ML: 755 INJECTION, SOLUTION INTRAVENOUS at 12:02

## 2023-09-14 RX ADMIN — PHENOBARBITAL, HYOSCYAMINE SULFATE, ATROPINE SULFATE, SCOPOLAMINE HYDROBROMIDE 32.4 MG: 16.2; .1037; .0194; .0065 TABLET ORAL at 10:25

## 2023-09-14 RX ADMIN — ALUMINUM HYDROXIDE, MAGNESIUM HYDROXIDE, AND DIMETHICONE 15 ML: 400; 400; 40 SUSPENSION ORAL at 10:25

## 2023-09-18 NOTE — ED PROVIDER NOTES
Subjective     History provided by:  Patient   used: No    Chest Pain  Pain location:  L chest and R chest  Pain quality: aching and dull    Pain radiates to:  Does not radiate  Pain severity:  Mild  Onset quality:  Gradual  Duration:  1 day  Timing:  Sporadic  Progression:  Waxing and waning  Chronicity:  New  Context: not breathing, not drug use, not eating, not intercourse, not lifting, not movement, not raising an arm, not at rest, not stress and not trauma    Relieved by:  Nothing  Worsened by:  Nothing  Ineffective treatments:  None tried  Associated symptoms: nausea and vomiting    Associated symptoms: no abdominal pain, no AICD problem, no altered mental status, no anorexia, no anxiety, no back pain, no claudication, no cough, no diaphoresis, no dizziness, no dysphagia, no fatigue, no fever, no headache, no heartburn, no lower extremity edema, no near-syncope, no numbness, no orthopnea, no palpitations, no PND, no shortness of breath, no syncope and no weakness    Risk factors: diabetes mellitus and obesity    Risk factors: no aortic disease, no birth control, no coronary artery disease, no Fidel-Danlos syndrome, no high cholesterol, no hypertension, no immobilization, not male, no Marfan's syndrome, not pregnant, no prior DVT/PE, no smoking and no surgery      Review of Systems   Constitutional:  Negative for activity change, appetite change, chills, diaphoresis, fatigue and fever.   HENT:  Negative for congestion, ear pain, sore throat and trouble swallowing.    Eyes:  Negative for redness.   Respiratory:  Negative for cough, chest tightness, shortness of breath and wheezing.    Cardiovascular:  Positive for chest pain. Negative for palpitations, orthopnea, claudication, leg swelling, syncope, PND and near-syncope.   Gastrointestinal:  Positive for nausea and vomiting. Negative for abdominal pain, anorexia, diarrhea and heartburn.   Genitourinary:  Negative for dysuria and urgency.    Musculoskeletal:  Negative for arthralgias, back pain, myalgias and neck pain.   Skin:  Negative for pallor, rash and wound.   Neurological:  Negative for dizziness, speech difficulty, weakness, numbness and headaches.   Psychiatric/Behavioral:  Negative for agitation, behavioral problems, confusion and decreased concentration.    All other systems reviewed and are negative.    Past Medical History:   Diagnosis Date   • Anxiety and depression    • Arthritis    • Back pain    • Body piercing     both ears   • Cancer     MELANOMA-right breast, back   • Diabetes mellitus    • Diverticulitis    • GERD (gastroesophageal reflux disease)    • H/O bladder infections    • Hiatal hernia    • History of being tatooed    • Hot flashes    • Hypertension    • Kidney stones    • Menstrual changes    • Pelvic pain    • Pneumonia    • PONV (postoperative nausea and vomiting)    • Urinary incontinence    • Vitamin D deficiency    • Wears glasses     for reading       Allergies   Allergen Reactions   • Penicillins Anaphylaxis       Past Surgical History:   Procedure Laterality Date   • ABDOMINAL SURGERY     • BLADDER SURGERY      sling x 2   •  SECTION      x1   • CHOLECYSTECTOMY     • COLONOSCOPY     • COLONOSCOPY N/A 2023    Procedure: COLONOSCOPY FOR SCREENING;  Surgeon: Enid Aviles MD;  Location: [facility];  Service: Gastroenterology;  Laterality: N/A;   • CYSTOSCOPY BOTOX INJECTION OF BLADDER N/A 3/13/2023    Procedure: Cystoscopy Botox injection of bladder;  Surgeon: Steven Wayne MD;  Location: [facility];  Service: Urology;  Laterality: N/A;   • CYSTOSCOPY URETEROSCOPY LASER LITHOTRIPSY Right 2021    Procedure: CYSTOSCOPY URETEROSCOPY RETROGRADE RIGHT, URETEROGRAM;  Surgeon: Steven Wayne MD;  Location: [facility];  Service: Urology;  Laterality: Right;   • ENDOSCOPY     • ENDOSCOPY N/A 2023    Procedure: ESOPHAGOGASTRODUODENOSCOPY WITH BIOPSY;  Surgeon:  Enid Aviles MD;  Location: [facility] OR;  Service: Gastroenterology;  Laterality: N/A;   • INTERSTIM REMOVAL N/A 10/23/2019    Procedure: INTERSTIM REMOVAL;  Surgeon: Jose Adams MD;  Location: [facility] OR;  Service: Urology   • SEPTOPLASTY     • SKIN BIOPSY     • TONSILLECTOMY AND ADENOIDECTOMY     • TUBAL ABDOMINAL LIGATION         Family History   Problem Relation Age of Onset   • Diabetes Mother    • Arthritis Mother    • Cancer Mother         renal cell carconma   • Diabetes Father    • Hypertension Father    • Asthma Son    • Breast cancer Neg Hx        Social History     Socioeconomic History   • Marital status:    Tobacco Use   • Smoking status: Former     Packs/day: 0.50     Years: 30.00     Pack years: 15.00     Types: Cigarettes     Quit date: 2022     Years since quittin.0   • Smokeless tobacco: Never   Vaping Use   • Vaping Use: Every day   • Substances: Nicotine, Flavoring   • Devices: Refillable tank   Substance and Sexual Activity   • Alcohol use: No   • Drug use: No   • Sexual activity: Defer           Objective   Physical Exam  Vitals and nursing note reviewed.   Constitutional:       General: She is not in acute distress.     Appearance: Normal appearance. She is well-developed. She is not toxic-appearing or diaphoretic.   HENT:      Head: Normocephalic and atraumatic.      Right Ear: External ear normal.      Left Ear: External ear normal.      Nose: Nose normal.      Mouth/Throat:      Pharynx: No oropharyngeal exudate.      Tonsils: No tonsillar exudate.   Eyes:      General: Lids are normal.      Conjunctiva/sclera: Conjunctivae normal.      Pupils: Pupils are equal, round, and reactive to light.   Neck:      Thyroid: No thyromegaly.   Cardiovascular:      Rate and Rhythm: Normal rate and regular rhythm.      Pulses: Normal pulses.      Heart sounds: Normal heart sounds, S1 normal and S2 normal.   Pulmonary:      Effort: Pulmonary effort is normal. No  tachypnea or respiratory distress.      Breath sounds: Normal breath sounds. No decreased breath sounds, wheezing or rales.   Chest:      Chest wall: No tenderness.   Abdominal:      General: Bowel sounds are normal. There is no distension.      Palpations: Abdomen is soft.      Tenderness: There is no abdominal tenderness. There is no guarding or rebound.   Musculoskeletal:         General: No tenderness or deformity. Normal range of motion.      Cervical back: Full passive range of motion without pain, normal range of motion and neck supple.   Lymphadenopathy:      Cervical: No cervical adenopathy.   Skin:     General: Skin is warm and dry.      Coloration: Skin is not pale.      Findings: No erythema or rash.   Neurological:      Mental Status: She is alert and oriented to person, place, and time.      GCS: GCS eye subscore is 4. GCS verbal subscore is 5. GCS motor subscore is 6.      Cranial Nerves: No cranial nerve deficit.      Sensory: No sensory deficit.   Psychiatric:         Speech: Speech normal.         Behavior: Behavior normal.         Thought Content: Thought content normal.         Judgment: Judgment normal.       Procedures           ED Course  ED Course as of 09/18/23 1552   Thu Sep 14, 2023   1011 XR Chest 1 View  IMPRESSION:  No radiographic evidence of acute cardiac or pulmonary disease.   [ES]   1335 ECG 12 Lead Chest Pain  Vent. Rate :  80 BPM     Atrial Rate :  80 BPM     P-R Int : 146 ms          QRS Dur :  82 ms      QT Int : 384 ms       P-R-T Axes :  44  40  44 degrees     QTc Int : 442 ms     Normal sinus rhythm  Normal ECG  When compared with ECG of 24-JUN-2023 05:28,  No significant change was found   [ES]   Mon Sep 18, 2023   1552 CT Angiogram Chest Pulmonary Embolism    IMPRESSION:  No evidence of a pulmonary embolus.   [ES]   1552 CT Abdomen Pelvis Without Contrast  IMPRESSION:     1. Fatty infiltration of the liver     2.Moderate to large stool burden     3. Hiatal hernia   [ES]    1552 Heart Score: 3 [ES]      ED Course User Index  [ES] Michael Alberts MD                                           Medical Decision Making    History provided by:  Patient   used: No    Chest Pain  Pain location:  L chest and R chest  Pain quality: aching and dull    Pain radiates to:  Does not radiate  Pain severity:  Mild  Onset quality:  Gradual  Duration:  1 day  Timing:  Sporadic  Progression:  Waxing and waning  Chronicity:  New  Context: not breathing, not drug use, not eating, not intercourse, not lifting, not movement, not raising an arm, not at rest, not stress and not trauma    Relieved by:  Nothing  Worsened by:  Nothing  Ineffective treatments:  None tried  Associated symptoms: nausea and vomiting    Associated symptoms: no abdominal pain, no AICD problem, no altered mental status, no anorexia, no anxiety, no back pain, no claudication, no cough, no diaphoresis, no dizziness, no dysphagia, no fatigue, no fever, no headache, no heartburn, no lower extremity edema, no near-syncope, no numbness, no orthopnea, no palpitations, no PND, no shortness of breath, no syncope and no weakness    Risk factors: diabetes mellitus and obesity    Risk factors: no aortic disease, no birth control, no coronary artery disease, no Fidel-Danlos syndrome, no high cholesterol, no hypertension, no immobilization, not male, no Marfan's syndrome, not pregnant, no prior DVT/PE, no smoking and no surgery      Problems Addressed:  Nonspecific chest pain: complicated acute illness or injury    Amount and/or Complexity of Data Reviewed  External Data Reviewed: labs, radiology, ECG and notes.  Labs: ordered. Decision-making details documented in ED Course.  Radiology: ordered and independent interpretation performed. Decision-making details documented in ED Course.  ECG/medicine tests: ordered and independent interpretation performed. Decision-making details documented in ED Course.    Risk  OTC  drugs.  Prescription drug management.        Final diagnoses:   Nonspecific chest pain   Hiatal hernia   Constipation, unspecified constipation type       ED Disposition  ED Disposition       ED Disposition   Discharge    Condition   Stable    Comment   --               Celine Chacon PA-C  [address]  [address]  [facility] [id number] [phone]    Schedule an appointment as soon as possible for a visit in 1 day  EVALUATE         Medication List        New Prescriptions      sucralfate 1 GM/10ML suspension  Commonly known as: CARAFATE  Take 10 mL by mouth 2 (Two) Times a Day for 20 days.               Where to Get Your Medications        These medications were sent to [facility], [place] - [address] - [phone] [facility] [phone] FX  [address], [facility] [address]      Phone: [phone]   pantoprazole 40 MG EC tablet  sucralfate 1 GM/10ML suspension            Michael Alberts MD  09/18/23 0372

## 2024-11-26 ENCOUNTER — HOSPITAL ENCOUNTER (OUTPATIENT)
Dept: GENERAL RADIOLOGY | Facility: HOSPITAL | Age: 54
Discharge: HOME | End: 2024-11-26
Admitting: PHYSICIAN ASSISTANT
Payer: COMMERCIAL

## 2024-11-26 ENCOUNTER — OFFICE VISIT (OUTPATIENT)
Dept: ORTHOPEDIC SURGERY | Facility: CLINIC | Age: 54
End: 2024-11-26
Payer: COMMERCIAL

## 2024-11-26 VITALS
HEIGHT: 67 IN | HEART RATE: 105 BPM | BODY MASS INDEX: 38.24 KG/M2 | SYSTOLIC BLOOD PRESSURE: 142 MMHG | DIASTOLIC BLOOD PRESSURE: 84 MMHG | WEIGHT: 243.6 LBS

## 2024-11-26 DIAGNOSIS — M54.40 BACK PAIN OF LUMBAR REGION WITH SCIATICA: ICD-10-CM

## 2024-11-26 DIAGNOSIS — M25.561 PAIN IN BOTH KNEES, UNSPECIFIED CHRONICITY: ICD-10-CM

## 2024-11-26 DIAGNOSIS — M25.561 PAIN IN BOTH KNEES, UNSPECIFIED CHRONICITY: Primary | ICD-10-CM

## 2024-11-26 DIAGNOSIS — M25.562 PAIN IN BOTH KNEES, UNSPECIFIED CHRONICITY: Primary | ICD-10-CM

## 2024-11-26 DIAGNOSIS — M25.562 PAIN IN BOTH KNEES, UNSPECIFIED CHRONICITY: ICD-10-CM

## 2024-11-26 PROCEDURE — 99203 OFFICE O/P NEW LOW 30 MIN: CPT | Performed by: PHYSICIAN ASSISTANT

## 2024-11-26 PROCEDURE — 20610 DRAIN/INJ JOINT/BURSA W/O US: CPT | Performed by: PHYSICIAN ASSISTANT

## 2024-11-26 PROCEDURE — 73562 X-RAY EXAM OF KNEE 3: CPT

## 2024-11-26 RX ADMIN — LIDOCAINE HYDROCHLORIDE 5 ML: 10 INJECTION, SOLUTION EPIDURAL; INFILTRATION; INTRACAUDAL; PERINEURAL at 20:08

## 2024-11-26 RX ADMIN — METHYLPREDNISOLONE ACETATE 40 MG: 80 INJECTION, SUSPENSION INTRA-ARTICULAR; INTRALESIONAL; INTRAMUSCULAR; SOFT TISSUE at 20:08

## 2024-11-27 ENCOUNTER — TELEPHONE (OUTPATIENT)
Dept: ORTHOPEDIC SURGERY | Facility: CLINIC | Age: 54
End: 2024-11-27
Payer: COMMERCIAL

## 2024-11-27 NOTE — TELEPHONE ENCOUNTER
Patient called complaining of her knees, hips, and low back hurting her. Stating she could hardly walk. Advised patient that if she showed signs of infection (drainage from injection sit,swollen, red, warm to touch, fever, and chills) to go to the hospital. But if it is just painful to take ibuprofen and use heat to help with the pain. Also advised patient that it may take a couple days before she will see change from the cortisone and it was normal to have some pain following the injection.     Called Iban made him aware.

## 2024-12-10 ENCOUNTER — OFFICE VISIT (OUTPATIENT)
Dept: ORTHOPEDIC SURGERY | Facility: CLINIC | Age: 54
End: 2024-12-10
Payer: COMMERCIAL

## 2024-12-10 VITALS — HEIGHT: 67 IN | BODY MASS INDEX: 38.14 KG/M2 | WEIGHT: 243 LBS

## 2024-12-10 DIAGNOSIS — M25.562 PAIN IN BOTH KNEES, UNSPECIFIED CHRONICITY: ICD-10-CM

## 2024-12-10 DIAGNOSIS — M25.561 PAIN IN BOTH KNEES, UNSPECIFIED CHRONICITY: ICD-10-CM

## 2024-12-10 DIAGNOSIS — M54.16 LUMBAR RADICULOPATHY: Primary | ICD-10-CM

## 2024-12-10 PROCEDURE — 99213 OFFICE O/P EST LOW 20 MIN: CPT | Performed by: PHYSICIAN ASSISTANT

## 2024-12-10 RX ORDER — LIDOCAINE HYDROCHLORIDE 10 MG/ML
5 INJECTION, SOLUTION EPIDURAL; INFILTRATION; INTRACAUDAL; PERINEURAL
Status: COMPLETED | OUTPATIENT
Start: 2024-11-26 | End: 2024-11-26

## 2024-12-10 RX ORDER — METHYLPREDNISOLONE ACETATE 80 MG/ML
40 INJECTION, SUSPENSION INTRA-ARTICULAR; INTRALESIONAL; INTRAMUSCULAR; SOFT TISSUE
Status: COMPLETED | OUTPATIENT
Start: 2024-11-26 | End: 2024-11-26

## 2024-12-10 RX ORDER — METHYLPREDNISOLONE 4 MG/1
TABLET ORAL
Qty: 21 TABLET | Refills: 0 | Status: SHIPPED | OUTPATIENT
Start: 2024-12-10

## 2024-12-10 RX ORDER — NAPROXEN 500 MG/1
500 TABLET ORAL 2 TIMES DAILY WITH MEALS
Qty: 60 TABLET | Refills: 2 | Status: SHIPPED | OUTPATIENT
Start: 2024-12-10

## 2024-12-11 NOTE — PROGRESS NOTES
[facility] Orthopaedic Surgery New Patient Visit          Patient: Briseyda Edouard  YOB: 1970  Date of Encounter: 11/26/2024  PCP: Gabby Fishman APRN      Subjective     Chief Complaint   Patient presents with    Left Knee - Pain, Initial Evaluation    Right Knee - Pain, Initial Evaluation           History of Present Illness:     Briseyda Edouard is a 54 y.o. female presents today as result of bilateral knee pain left worse than right.  The patient states that she had seen another orthopedist several years ago that she received injections in the knees that were minimally efficacious.  She has had progressive and pain from the posterior lower lumbar spine into the posterior hip and pelvis progressing down the thighs to the level of the knees bilaterally.  She continues to report pain and weakness with ambulation        Patient Active Problem List   Diagnosis    Defecation urgency    Vitamin D deficiency    Anxiety and depression    Acute left ankle pain    Microhematuria    Gross hematuria    Family history of renal cell carcinoma    Pelvic pain in female    Urge incontinence of urine    Left shoulder pain    Detrusor instability    Urinary tract infection with hematuria    Detrusor hyperreflexia    Right ureteral stone    Right flank pain, chronic    Acute cystitis without hematuria    Hematuria    DDD (degenerative disc disease), lumbar    Chronic bilateral low back pain without sciatica    Urine frequency    Frequency of micturition    Incomplete emptying of bladder    Interstitial cystitis (chronic) with hematuria    Chronic idiopathic constipation    Gastroesophageal reflux disease without esophagitis    Encounter for screening for malignant neoplasm of colon    Epigastric pain    Nausea and vomiting    Polyp of colon     Past Medical History:   Diagnosis Date    Anxiety and depression     Arthritis     Back pain     Body piercing     both ears    Cancer     MELANOMA-right breast, back     Diabetes mellitus     Diverticulitis     GERD (gastroesophageal reflux disease)     H/O bladder infections     Hiatal hernia     History of being tatooed     Hot flashes     Hypertension     Kidney stones     Menstrual changes     Pelvic pain     Pneumonia     PONV (postoperative nausea and vomiting)     Urinary incontinence     Vitamin D deficiency     Wears glasses     for reading     Past Surgical History:   Procedure Laterality Date    ABDOMINAL SURGERY      BLADDER SURGERY      sling x 2     SECTION      x1    CHOLECYSTECTOMY      COLONOSCOPY      COLONOSCOPY N/A 2023    Procedure: COLONOSCOPY FOR SCREENING;  Surgeon: Enid Aviles MD;  Location: [facility];  Service: Gastroenterology;  Laterality: N/A;    CYSTOSCOPY BOTOX INJECTION OF BLADDER N/A 3/13/2023    Procedure: Cystoscopy Botox injection of bladder;  Surgeon: Steven Wayne MD;  Location: [facility];  Service: Urology;  Laterality: N/A;    CYSTOSCOPY URETEROSCOPY LASER LITHOTRIPSY Right 2021    Procedure: CYSTOSCOPY URETEROSCOPY RETROGRADE RIGHT, URETEROGRAM;  Surgeon: Steven Wayne MD;  Location: [facility];  Service: Urology;  Laterality: Right;    ENDOSCOPY      ENDOSCOPY N/A 2023    Procedure: ESOPHAGOGASTRODUODENOSCOPY WITH BIOPSY;  Surgeon: Enid Aviles MD;  Location: [facility] OR;  Service: Gastroenterology;  Laterality: N/A;    INTERSTIM REMOVAL N/A 10/23/2019    Procedure: INTERSTIM REMOVAL;  Surgeon: Jose Adams MD;  Location: [facility];  Service: Urology    SEPTOPLASTY      SKIN BIOPSY      TONSILLECTOMY AND ADENOIDECTOMY      TUBAL ABDOMINAL LIGATION       Social History     Occupational History    Not on file   Tobacco Use    Smoking status: Former     Current packs/day: 0.00     Average packs/day: 0.5 packs/day for 30.0 years (15.0 ttl pk-yrs)     Types: Cigarettes     Start date: 1992     Quit date: 2022     Years since quittin.2    Smokeless tobacco:  Never   Vaping Use    Vaping status: Every Day    Substances: Nicotine, Flavoring    Devices: Refillable tank   Substance and Sexual Activity    Alcohol use: No    Drug use: No    Sexual activity: Defer    Briseyda Edouard  reports that she quit smoking about 2 years ago. Her smoking use included cigarettes. She started smoking about 32 years ago. She has a 15 pack-year smoking history. She has never used smokeless tobacco. I have educated her on the risk of diseases from using tobacco products such as cancer, COPD, and heart disease.           Social History     Social History Narrative    Lives at home     Family History   Problem Relation Age of Onset    Diabetes Mother     Arthritis Mother     Cancer Mother         renal cell carconma    Diabetes Father     Hypertension Father     Asthma Son     Breast cancer Neg Hx      Current Outpatient Medications   Medication Sig Dispense Refill    ascorbic acid (VITAMIN C) 500 MG tablet Take 1 tablet by mouth every night at bedtime.      busPIRone (BUSPAR) 10 MG tablet Take 1 tablet by mouth every night at bedtime.      dicyclomine (BENTYL) 20 MG tablet Take 1 tablet by mouth Every 8 (Eight) Hours As Needed for Abdominal Cramping. 30 tablet 0    estrogens, conjugated, (PREMARIN) 0.3 MG tablet Take 1 tablet by mouth Daily.      FLUoxetine (PROzac) 20 MG capsule Take 3 capsules by mouth Daily.      HYDROcodone-acetaminophen (NORCO) 5-325 MG per tablet Take 1 tablet by mouth Every 6 (Six) Hours As Needed.      lisinopril (PRINIVIL,ZESTRIL) 10 MG tablet Take 1 tablet by mouth Daily.      metFORMIN ER (GLUCOPHAGE-XR) 500 MG 24 hr tablet Take 1 tablet by mouth Daily With Breakfast.      ondansetron (ZOFRAN) 4 MG tablet Take 1 tablet by mouth Every 6 (Six) Hours. 20 tablet 0    ondansetron (Zofran) 4 MG tablet Take 1 tablet by mouth Every 12 (Twelve) Hours As Needed for Nausea. 20 tablet 0    pantoprazole (PROTONIX) 40 MG EC tablet Take 1 tablet by mouth Daily. 30 tablet 0     "polyethylene glycol (MIRALAX) 17 GM/SCOOP powder Take 17 g by mouth Daily. 507 g 0    sulfamethoxazole-trimethoprim (BACTRIM DS,SEPTRA DS) 800-160 MG per tablet Take 1 tablet by mouth 2 (Two) Times a Day.      vitamin B-12 (CYANOCOBALAMIN) 500 MCG tablet Take 1 tablet by mouth Daily.      vitamin D (ERGOCALCIFEROL) 45353 units capsule capsule Take 1 capsule by mouth Every 7 (Seven) Days. Taking on wednesdays      methylPREDNISolone (MEDROL) 4 MG dose pack Use as directed by package instructions 21 tablet 0    naproxen (NAPROSYN) 500 MG tablet Take 1 tablet by mouth 2 (Two) Times a Day With Meals. 60 tablet 2     No current facility-administered medications for this visit.     Allergies   Allergen Reactions    Penicillins Anaphylaxis            Review of Systems   Constitutional: Negative.   HENT: Negative.     Eyes: Negative.    Cardiovascular: Negative.    Respiratory: Negative.     Endocrine: Negative.    Hematologic/Lymphatic: Negative.    Skin: Negative.    Musculoskeletal:         Pertinent positives listed in HPI   Gastrointestinal: Negative.    Genitourinary: Negative.    Neurological: Negative.    Psychiatric/Behavioral: Negative.     Allergic/Immunologic: Negative.          Objective      Vitals:    11/26/24 1450   BP: 142/84   Pulse: 105   Weight: 110 kg (243 lb 9.6 oz)   Height: 170.2 cm (67.01\")             Physical Exam  Vitals and nursing note reviewed.   Constitutional:       General: She is not in acute distress.     Appearance: She is not ill-appearing.   HENT:      Head: Normocephalic and atraumatic.      Right Ear: External ear normal.      Left Ear: External ear normal.      Nose: Nose normal. No congestion or rhinorrhea.   Eyes:      Extraocular Movements: Extraocular movements intact.      Conjunctiva/sclera: Conjunctivae normal.      Pupils: Pupils are equal, round, and reactive to light.   Cardiovascular:      Rate and Rhythm: Normal rate.      Pulses: Normal pulses.   Pulmonary:      Effort: " Pulmonary effort is normal. No respiratory distress.      Breath sounds: No stridor.   Abdominal:      General: There is no distension.   Musculoskeletal:      Cervical back: Normal range of motion.      Lumbar back: Tenderness and bony tenderness present. No spasms. Decreased range of motion. Positive right straight leg raise test and positive left straight leg raise test.      Right knee: Bony tenderness present. No swelling, deformity, effusion, erythema, ecchymosis or crepitus. Normal range of motion. Tenderness present over the medial joint line and lateral joint line. No MCL, LCL, ACL, PCL or patellar tendon tenderness. No LCL laxity, MCL laxity, ACL laxity or PCL laxity. Normal alignment, normal meniscus and normal patellar mobility. Normal pulse.      Instability Tests: Anterior drawer test negative. Posterior drawer test negative. Anterior Lachman test negative. Medial Pamela test negative and lateral Pamela test negative.      Left knee: Bony tenderness and crepitus present. No swelling, deformity, effusion, erythema or ecchymosis. Normal range of motion. Tenderness present over the medial joint line and lateral joint line. No MCL, LCL, ACL, PCL or patellar tendon tenderness. No LCL laxity, MCL laxity, ACL laxity or PCL laxity.Normal alignment, normal meniscus and normal patellar mobility. Normal pulse.      Instability Tests: Anterior drawer test negative. Posterior drawer test negative. Anterior Lachman test negative. Medial Pamela test negative and lateral Pamela test negative.   Skin:     General: Skin is warm and dry.      Capillary Refill: Capillary refill takes less than 2 seconds.   Neurological:      General: No focal deficit present.      Mental Status: She is alert and oriented to person, place, and time.   Psychiatric:         Mood and Affect: Mood normal.         Behavior: Behavior normal.         Thought Content: Thought content normal.         Judgment: Judgment normal.                Radiology:      XR Knee 3 View Bilateral    Result Date: 11/27/2024    Unremarkable bilateral knee x-rays.  This report was finalized on 11/27/2024 8:20 AM by Dr. Pablo Del Castillo MD.             Assessment/Plan        ICD-10-CM ICD-9-CM   1. Back pain of lumbar region with sciatica  M54.40 724.2     724.3   2. Pain in both knees, unspecified chronicity  M25.561 719.46    M25.562        54-year-old female with bilateral knee pain and lower lumbar back pain concerning for radiculopathy.  Patient has questionable historical and exam findings concerning for bilateral knee pain thought to be representative of chronic lower lumbar back pain and radiculopathy.  As a diagnostic and therapeutic approach the patient was provided today with a right knee injection 80 mg Depo-Medrol with lidocaine block injected into the intra-articular space of the right knee.  The patient tolerated this well.  She will forego left knee injections as she just received these from the PCP in the past.  The patient will return back for further evaluation in reference to the chronic lower lumbar back pain and thought grade be representative of L3 radiculopathy.      Large Joint Arthrocentesis: R knee  Date/Time: 11/26/2024 8:08 PM  Consent given by: patient  Site marked: site marked  Supporting Documentation  Indications: pain and diagnostic evaluation   Procedure Details  Location: knee - R knee  Needle size: 25 G  Approach: anterolateral  Medications administered: 40 mg methylPREDNISolone acetate 80 MG/ML; 5 mL lidocaine PF 1% 1 %  Patient tolerance: patient tolerated the procedure well with no immediate complications                    This document was signed by Iban Botello PA-C November 26, 2024    CC: Gabby Fishman APRN      Dictated Utilizing Dragon Dictation:   Please note that portions of this note were completed with a voice recognition program.   Part of this note may be an electronic transcription/translation of  spoken language to printed text using the Dragon Dictation System.

## 2024-12-23 NOTE — PROGRESS NOTES
[facility] Orthopaedic Surgery New Patient Visit          Patient: Briseyda Edouard  YOB: 1970  Date of Encounter: 12/10/2024  PCP: Gabby Fishman APRN      Subjective     Chief Complaint   Patient presents with    Right Knee - Follow-up, Pain    Left Knee - Follow-up, Pain           History of Present Illness:     Briseyda Edouard is a 54 y.o. female presents today as result of lower lumbar back pain with bilateral lower extremity pain right worse than left.  The patient states that she had seen another orthopedist several years ago that she received injections in the knees that were minimally efficacious.  She has had progressive and pain from the posterior lower lumbar spine into the posterior hip and pelvis progressing down the thighs to the level of the knees bilaterally.  She continues to report pain and weakness with ambulation.  She continues to have ongoing worsening pain absent and recalcitrant to conservative treatment options thus far.        Patient Active Problem List   Diagnosis    Defecation urgency    Vitamin D deficiency    Anxiety and depression    Acute left ankle pain    Microhematuria    Gross hematuria    Family history of renal cell carcinoma    Pelvic pain in female    Urge incontinence of urine    Left shoulder pain    Detrusor instability    Urinary tract infection with hematuria    Detrusor hyperreflexia    Right ureteral stone    Right flank pain, chronic    Acute cystitis without hematuria    Hematuria    DDD (degenerative disc disease), lumbar    Chronic bilateral low back pain without sciatica    Urine frequency    Frequency of micturition    Incomplete emptying of bladder    Interstitial cystitis (chronic) with hematuria    Chronic idiopathic constipation    Gastroesophageal reflux disease without esophagitis    Encounter for screening for malignant neoplasm of colon    Epigastric pain    Nausea and vomiting    Polyp of colon     Past Medical History:   Diagnosis Date     Anxiety and depression     Arthritis     Back pain     Body piercing     both ears    Cancer     MELANOMA-right breast, back    Diabetes mellitus     Diverticulitis     GERD (gastroesophageal reflux disease)     H/O bladder infections     Hiatal hernia     History of being tatooed     Hot flashes     Hypertension     Kidney stones     Menstrual changes     Pelvic pain     Pneumonia     PONV (postoperative nausea and vomiting)     Urinary incontinence     Vitamin D deficiency     Wears glasses     for reading     Past Surgical History:   Procedure Laterality Date    ABDOMINAL SURGERY      BLADDER SURGERY      sling x 2     SECTION      x1    CHOLECYSTECTOMY      COLONOSCOPY      COLONOSCOPY N/A 2023    Procedure: COLONOSCOPY FOR SCREENING;  Surgeon: Enid Aviles MD;  Location: [facility];  Service: Gastroenterology;  Laterality: N/A;    CYSTOSCOPY BOTOX INJECTION OF BLADDER N/A 3/13/2023    Procedure: Cystoscopy Botox injection of bladder;  Surgeon: Steven Wayne MD;  Location: [facility];  Service: Urology;  Laterality: N/A;    CYSTOSCOPY URETEROSCOPY LASER LITHOTRIPSY Right 2021    Procedure: CYSTOSCOPY URETEROSCOPY RETROGRADE RIGHT, URETEROGRAM;  Surgeon: Steven Wayne MD;  Location: [facility];  Service: Urology;  Laterality: Right;    ENDOSCOPY      ENDOSCOPY N/A 2023    Procedure: ESOPHAGOGASTRODUODENOSCOPY WITH BIOPSY;  Surgeon: Enid Aviles MD;  Location: [facility];  Service: Gastroenterology;  Laterality: N/A;    INTERSTIM REMOVAL N/A 10/23/2019    Procedure: INTERSTIM REMOVAL;  Surgeon: Jose Adams MD;  Location: [facility];  Service: Urology    SEPTOPLASTY      SKIN BIOPSY      TONSILLECTOMY AND ADENOIDECTOMY      TUBAL ABDOMINAL LIGATION       Social History     Occupational History    Not on file   Tobacco Use    Smoking status: Former     Current packs/day: 0.00     Average packs/day: 0.5 packs/day for 30.0 years (15.0 ttl  pk-yrs)     Types: Cigarettes     Start date: 1992     Quit date: 2022     Years since quittin.3    Smokeless tobacco: Never   Vaping Use    Vaping status: Every Day    Substances: Nicotine, Flavoring    Devices: Refillable tank   Substance and Sexual Activity    Alcohol use: No    Drug use: No    Sexual activity: Defer    Briseyda Edouard  reports that she quit smoking about 2 years ago. Her smoking use included cigarettes. She started smoking about 32 years ago. She has a 15 pack-year smoking history. She has never used smokeless tobacco. I have educated her on the risk of diseases from using tobacco products such as cancer, COPD, and heart disease.           Social History     Social History Narrative    Lives at home     Family History   Problem Relation Age of Onset    Diabetes Mother     Arthritis Mother     Cancer Mother         renal cell carconma    Diabetes Father     Hypertension Father     Asthma Son     Breast cancer Neg Hx      Current Outpatient Medications   Medication Sig Dispense Refill    ascorbic acid (VITAMIN C) 500 MG tablet Take 1 tablet by mouth every night at bedtime.      busPIRone (BUSPAR) 10 MG tablet Take 1 tablet by mouth every night at bedtime.      dicyclomine (BENTYL) 20 MG tablet Take 1 tablet by mouth Every 8 (Eight) Hours As Needed for Abdominal Cramping. 30 tablet 0    estrogens, conjugated, (PREMARIN) 0.3 MG tablet Take 1 tablet by mouth Daily.      FLUoxetine (PROzac) 20 MG capsule Take 3 capsules by mouth Daily.      HYDROcodone-acetaminophen (NORCO) 5-325 MG per tablet Take 1 tablet by mouth Every 6 (Six) Hours As Needed.      lisinopril (PRINIVIL,ZESTRIL) 10 MG tablet Take 1 tablet by mouth Daily.      metFORMIN ER (GLUCOPHAGE-XR) 500 MG 24 hr tablet Take 1 tablet by mouth Daily With Breakfast.      ondansetron (ZOFRAN) 4 MG tablet Take 1 tablet by mouth Every 6 (Six) Hours. 20 tablet 0    ondansetron (Zofran) 4 MG tablet Take 1 tablet by mouth Every 12 (Twelve)  "Hours As Needed for Nausea. 20 tablet 0    pantoprazole (PROTONIX) 40 MG EC tablet Take 1 tablet by mouth Daily. 30 tablet 0    polyethylene glycol (MIRALAX) 17 GM/SCOOP powder Take 17 g by mouth Daily. 507 g 0    sulfamethoxazole-trimethoprim (BACTRIM DS,SEPTRA DS) 800-160 MG per tablet Take 1 tablet by mouth 2 (Two) Times a Day.      vitamin B-12 (CYANOCOBALAMIN) 500 MCG tablet Take 1 tablet by mouth Daily.      vitamin D (ERGOCALCIFEROL) 72736 units capsule capsule Take 1 capsule by mouth Every 7 (Seven) Days. Taking on wednesdays      methylPREDNISolone (MEDROL) 4 MG dose pack Use as directed by package instructions 21 tablet 0    naproxen (NAPROSYN) 500 MG tablet Take 1 tablet by mouth 2 (Two) Times a Day With Meals. 60 tablet 2     No current facility-administered medications for this visit.     Allergies   Allergen Reactions    Penicillins Anaphylaxis            Review of Systems   Constitutional: Negative.   HENT: Negative.     Eyes: Negative.    Cardiovascular: Negative.    Respiratory: Negative.     Endocrine: Negative.    Hematologic/Lymphatic: Negative.    Skin: Negative.    Musculoskeletal:         Pertinent positives listed in HPI   Gastrointestinal: Negative.    Genitourinary: Negative.    Neurological: Negative.    Psychiatric/Behavioral: Negative.     Allergic/Immunologic: Negative.          Objective      Vitals:    12/10/24 1537   Weight: 110 kg (243 lb)   Height: 170.2 cm (67\")             Physical Exam  Vitals and nursing note reviewed.   Constitutional:       General: She is not in acute distress.     Appearance: She is not ill-appearing.   HENT:      Head: Normocephalic and atraumatic.      Right Ear: External ear normal.      Left Ear: External ear normal.      Nose: Nose normal. No congestion or rhinorrhea.   Eyes:      Extraocular Movements: Extraocular movements intact.      Conjunctiva/sclera: Conjunctivae normal.      Pupils: Pupils are equal, round, and reactive to light. "   Cardiovascular:      Rate and Rhythm: Normal rate.      Pulses: Normal pulses.   Pulmonary:      Effort: Pulmonary effort is normal. No respiratory distress.      Breath sounds: No stridor.   Abdominal:      General: There is no distension.   Musculoskeletal:      Cervical back: Normal range of motion.      Lumbar back: Tenderness and bony tenderness present. No spasms. Decreased range of motion. Positive right straight leg raise test and positive left straight leg raise test.      Right knee: Bony tenderness present. No swelling, deformity, effusion, erythema, ecchymosis or crepitus. Normal range of motion. Tenderness present. No medial joint line, lateral joint line, MCL, LCL, ACL, PCL or patellar tendon tenderness. No LCL laxity, MCL laxity, ACL laxity or PCL laxity. Normal alignment, normal meniscus and normal patellar mobility. Normal pulse.      Instability Tests: Anterior drawer test negative. Posterior drawer test negative. Anterior Lachman test negative. Medial Pamela test negative and lateral Pamela test negative.      Left knee: Bony tenderness and crepitus present. No swelling, deformity, effusion, erythema or ecchymosis. Normal range of motion. Tenderness present. No medial joint line, lateral joint line, MCL, LCL, ACL, PCL or patellar tendon tenderness. No LCL laxity, MCL laxity, ACL laxity or PCL laxity.Normal alignment, normal meniscus and normal patellar mobility. Normal pulse.      Instability Tests: Anterior drawer test negative. Posterior drawer test negative. Anterior Lachman test negative. Medial Pamela test negative and lateral Pamela test negative.   Skin:     General: Skin is warm and dry.      Capillary Refill: Capillary refill takes less than 2 seconds.   Neurological:      General: No focal deficit present.      Mental Status: She is alert and oriented to person, place, and time.   Psychiatric:         Mood and Affect: Mood normal.         Behavior: Behavior normal.          Thought Content: Thought content normal.         Judgment: Judgment normal.                 Radiology:          XR Knee 3 View Bilateral    Result Date: 11/27/2024    Unremarkable bilateral knee x-rays.  This report was finalized on 11/27/2024 8:20 AM by Dr. Pablo Del Castillo MD.       Study Result    Narrative & Impression   EXAMINATION: CT LUMBAR SPINE WO CONTRAST-      CLINICAL INDICATION: severe left lower back pain        COMPARISON: 03/15/2019 procedure:  Axial images through the lumbar spine were acquired without any IV  contrast. Reformatted images were created.     FINDINGS:  There is preservation of the vertebral body heights     Disc space narrowing at L5-S1 with vacuum phenomena.     Mild facet arthropathy.     IMPRESSION:  1. Mild arthritic change, overall very similar today compared to the  previous study.  2. No acute fracture     This report was finalized on 3/16/2022 6:17 PM by Dr. Hossein Shine MD.         Assessment/Plan        ICD-10-CM ICD-9-CM   1. Lumbar radiculopathy  M54.16 724.4   2. Pain in both knees, unspecified chronicity  M25.561 719.46    M25.562      54-year-old female with notable lower lumbar back pain radiculopathy with bilateral lower extremity pain and symptoms.  There is minimal concern for bilateral knee osteoarthritis being source behind her pain and symptoms specifically given the radicular component pain and symptoms.  Today, the patient was provided with an order for Medrol Dosepak to be taken as directed as well as Naprosyn 500 mg 1 p.o. twice daily dispense #60 with 2 refills.  Patient was also given an order for formal outpatient physical therapy.  Patient will undergo further diagnostic imaging MRI lumbar spine.  Patient will return back upon completion of diagnostic imaging                  This document was signed by Iban Botello PA-C December 10, 2024    CC: Gabby Fishman APRN      Dictated Utilizing Dragon Dictation:   Please note that portions of this note were  completed with a voice recognition program.   Part of this note may be an electronic transcription/translation of spoken language to printed text using the Dragon Dictation System.

## 2025-01-14 ENCOUNTER — HOSPITAL ENCOUNTER (OUTPATIENT)
Dept: MRI IMAGING | Facility: HOSPITAL | Age: 55
Discharge: HOME | End: 2025-01-14
Admitting: PHYSICIAN ASSISTANT
Payer: COMMERCIAL

## 2025-01-14 DIAGNOSIS — M54.16 LUMBAR RADICULOPATHY: ICD-10-CM

## 2025-01-14 PROCEDURE — 72148 MRI LUMBAR SPINE W/O DYE: CPT

## 2025-01-14 PROCEDURE — 72148 MRI LUMBAR SPINE W/O DYE: CPT | Performed by: RADIOLOGY

## 2025-01-20 ENCOUNTER — OFFICE VISIT (OUTPATIENT)
Dept: ORTHOPEDIC SURGERY | Facility: CLINIC | Age: 55
End: 2025-01-20
Payer: COMMERCIAL

## 2025-01-20 VITALS — WEIGHT: 243 LBS | BODY MASS INDEX: 38.14 KG/M2 | HEIGHT: 67 IN

## 2025-01-20 DIAGNOSIS — M54.16 LUMBAR RADICULOPATHY: Primary | ICD-10-CM

## 2025-01-20 PROCEDURE — 99213 OFFICE O/P EST LOW 20 MIN: CPT | Performed by: PHYSICIAN ASSISTANT

## 2025-01-20 RX ORDER — PROGESTERONE 200 MG/1
200 CAPSULE ORAL DAILY
COMMUNITY

## 2025-01-20 RX ORDER — HYDROXYZINE HYDROCHLORIDE 25 MG/1
25 TABLET, FILM COATED ORAL 3 TIMES DAILY PRN
COMMUNITY

## 2025-01-20 RX ORDER — ESCITALOPRAM OXALATE 10 MG/1
10 TABLET ORAL DAILY
COMMUNITY

## 2025-01-20 RX ORDER — TRAZODONE HYDROCHLORIDE 50 MG/1
50 TABLET, FILM COATED ORAL NIGHTLY
COMMUNITY

## 2025-01-20 RX ORDER — DOCUSATE SODIUM 100 MG/1
100 CAPSULE, LIQUID FILLED ORAL 2 TIMES DAILY
COMMUNITY

## 2025-01-20 NOTE — PROGRESS NOTES
[facility] Orthopaedic Surgery Established Patient Visit          Patient: Briseyda Edouard  YOB: 1970  Date of Encounter: 1/20/2025  PCP: Gabby Fishman APRN      Subjective     Chief Complaint   Patient presents with    Lumbar Spine - Follow-up, Pain           History of Present Illness:     Briseyda Edouard is a 54 y.o. female presents today as result of lower lumbar back pain with bilateral lower extremity pain right worse than left.  The patient states that she had seen another orthopedist several years ago that she received injections in the knees that were minimally efficacious.  She has had progressive and pain from th her lumbar e posterior lower lumbar spine into the posterior hip and pelvis progressing down the thighs to the level of the knees bilaterally.  She continues to report pain and weakness with ambulation.  She continues to have ongoing worsening pain absent and recalcitrant to conservative treatment options thus far.  She has implemented physician directed home therapy exercises as previously provided to which she has noticed no benefit.  She presents today for MRI results review lumbar spine.        Patient Active Problem List   Diagnosis    Defecation urgency    Vitamin D deficiency    Anxiety and depression    Acute left ankle pain    Microhematuria    Gross hematuria    Family history of renal cell carcinoma    Pelvic pain in female    Urge incontinence of urine    Left shoulder pain    Detrusor instability    Urinary tract infection with hematuria    Detrusor hyperreflexia    Right ureteral stone    Right flank pain, chronic    Acute cystitis without hematuria    Hematuria    DDD (degenerative disc disease), lumbar    Chronic bilateral low back pain without sciatica    Urine frequency    Frequency of micturition    Incomplete emptying of bladder    Interstitial cystitis (chronic) with hematuria    Chronic idiopathic constipation    Gastroesophageal reflux disease without  esophagitis    Encounter for screening for malignant neoplasm of colon    Epigastric pain    Nausea and vomiting    Polyp of colon     Past Medical History:   Diagnosis Date    Ankle sprain     Anxiety and depression     Arthritis     Arthritis of back 1996    Arthritis of neck     Back pain     Body piercing     both ears    Cancer     MELANOMA-right breast, back    Diabetes mellitus     Diverticulitis     Fracture of ankle     GERD (gastroesophageal reflux disease)     H/O bladder infections     Hiatal hernia     Hip arthrosis     History of being tatooed     Hot flashes     Hypertension     Kidney stones     Knee sprain     Knee swelling     Low back strain     Lumbosacral disc disease     Menstrual changes     Pelvic pain     Pneumonia     PONV (postoperative nausea and vomiting)     Urinary incontinence     Vitamin D deficiency     Wears glasses     for reading     Past Surgical History:   Procedure Laterality Date    ABDOMINAL SURGERY      BLADDER SURGERY      sling x 2     SECTION      x1    CHOLECYSTECTOMY      COLONOSCOPY      COLONOSCOPY N/A 2023    Procedure: COLONOSCOPY FOR SCREENING;  Surgeon: Enid Aviles MD;  Location: [facility];  Service: Gastroenterology;  Laterality: N/A;    CYSTOSCOPY BOTOX INJECTION OF BLADDER N/A 3/13/2023    Procedure: Cystoscopy Botox injection of bladder;  Surgeon: Steven Wayne MD;  Location: [facility];  Service: Urology;  Laterality: N/A;    CYSTOSCOPY URETEROSCOPY LASER LITHOTRIPSY Right 2021    Procedure: CYSTOSCOPY URETEROSCOPY RETROGRADE RIGHT, URETEROGRAM;  Surgeon: Steven Wayne MD;  Location: [facility];  Service: Urology;  Laterality: Right;    ENDOSCOPY      ENDOSCOPY N/A 2023    Procedure: ESOPHAGOGASTRODUODENOSCOPY WITH BIOPSY;  Surgeon: Enid Aviles MD;  Location: [facility];  Service: Gastroenterology;  Laterality: N/A;    INTERSTIM REMOVAL N/A 10/23/2019    Procedure: INTERSTIM REMOVAL;   Surgeon: Jose Adams MD;  Location: [facility];  Service: Urology    SEPTOPLASTY      SKIN BIOPSY      TONSILLECTOMY AND ADENOIDECTOMY      TUBAL ABDOMINAL LIGATION       Social History     Occupational History    Not on file   Tobacco Use    Smoking status: Former     Current packs/day: 0.00     Average packs/day: 0.5 packs/day for 30.0 years (15.0 ttl pk-yrs)     Types: Cigarettes     Start date: 1992     Quit date: 2022     Years since quittin.4    Smokeless tobacco: Never   Vaping Use    Vaping status: Every Day    Substances: Nicotine, Flavoring    Devices: Refillable tank   Substance and Sexual Activity    Alcohol use: No    Drug use: No    Sexual activity: Yes     Partners: Male     Birth control/protection: Tubal ligation    Briseyda Edouard  reports that she quit smoking about 2 years ago. Her smoking use included cigarettes. She started smoking about 32 years ago. She has a 15 pack-year smoking history. She has never used smokeless tobacco. I have educated her on the risk of diseases from using tobacco products such as cancer, COPD, and heart disease.           Social History     Social History Narrative    Lives at home     Family History   Problem Relation Age of Onset    Diabetes Mother     Arthritis Mother     Cancer Mother         renal cell carconma    Diabetes Father     Hypertension Father     Asthma Son     Breast cancer Neg Hx      Current Outpatient Medications   Medication Sig Dispense Refill    docusate sodium (COLACE) 100 MG capsule Take 1 capsule by mouth 2 (Two) Times a Day.      escitalopram (LEXAPRO) 10 MG tablet Take 1 tablet by mouth Daily.      hydrOXYzine (ATARAX) 25 MG tablet Take 1 tablet by mouth 3 (Three) Times a Day As Needed for Itching.      naproxen (NAPROSYN) 500 MG tablet Take 1 tablet by mouth 2 (Two) Times a Day With Meals. 60 tablet 2    pantoprazole (PROTONIX) 40 MG EC tablet Take 1 tablet by mouth Daily. 30 tablet 0    Progesterone (PROMETRIUM) 200  "MG capsule Take 1 capsule by mouth Daily.      traZODone (DESYREL) 50 MG tablet Take 1 tablet by mouth Every Night.      vitamin D (ERGOCALCIFEROL) 36909 units capsule capsule Take 1 capsule by mouth Every 7 (Seven) Days. Taking on wednesdays      estrogens, conjugated, (PREMARIN) 0.3 MG tablet Take 1 tablet by mouth Daily.       No current facility-administered medications for this visit.     Allergies   Allergen Reactions    Penicillins Anaphylaxis            Review of Systems   Constitutional: Negative.   HENT: Negative.     Eyes: Negative.    Cardiovascular: Negative.    Respiratory: Negative.     Endocrine: Negative.    Hematologic/Lymphatic: Negative.    Skin: Negative.    Musculoskeletal:         Pertinent positives listed in HPI   Gastrointestinal: Negative.    Genitourinary: Negative.    Neurological: Negative.    Psychiatric/Behavioral: Negative.     Allergic/Immunologic: Negative.          Objective      Vitals:    01/20/25 1439   Weight: 110 kg (243 lb)   Height: 170.2 cm (67\")             Physical Exam  Vitals and nursing note reviewed.   Constitutional:       General: She is not in acute distress.     Appearance: She is not ill-appearing.   HENT:      Head: Normocephalic and atraumatic.      Right Ear: External ear normal.      Left Ear: External ear normal.      Nose: Nose normal. No congestion or rhinorrhea.   Eyes:      Extraocular Movements: Extraocular movements intact.      Conjunctiva/sclera: Conjunctivae normal.      Pupils: Pupils are equal, round, and reactive to light.   Cardiovascular:      Rate and Rhythm: Normal rate.      Pulses: Normal pulses.   Pulmonary:      Effort: Pulmonary effort is normal. No respiratory distress.      Breath sounds: No stridor.   Abdominal:      General: There is no distension.   Musculoskeletal:      Cervical back: Normal range of motion.      Lumbar back: Tenderness and bony tenderness present. No spasms. Decreased range of motion. Positive right straight leg " raise test and positive left straight leg raise test.      Right knee: Bony tenderness present. No swelling, deformity, effusion, erythema, ecchymosis or crepitus. Normal range of motion. Tenderness present. No medial joint line, lateral joint line, MCL, LCL, ACL, PCL or patellar tendon tenderness. No LCL laxity, MCL laxity, ACL laxity or PCL laxity. Normal alignment, normal meniscus and normal patellar mobility. Normal pulse.      Instability Tests: Anterior drawer test negative. Posterior drawer test negative. Anterior Lachman test negative. Medial Pamela test negative and lateral Pamela test negative.      Left knee: Bony tenderness and crepitus present. No swelling, deformity, effusion, erythema or ecchymosis. Normal range of motion. Tenderness present. No medial joint line, lateral joint line, MCL, LCL, ACL, PCL or patellar tendon tenderness. No LCL laxity, MCL laxity, ACL laxity or PCL laxity.Normal alignment, normal meniscus and normal patellar mobility. Normal pulse.      Instability Tests: Anterior drawer test negative. Posterior drawer test negative. Anterior Lachman test negative. Medial Pamela test negative and lateral Pamela test negative.   Skin:     General: Skin is warm and dry.      Capillary Refill: Capillary refill takes less than 2 seconds.   Neurological:      General: No focal deficit present.      Mental Status: She is alert and oriented to person, place, and time.   Psychiatric:         Mood and Affect: Mood normal.         Behavior: Behavior normal.         Thought Content: Thought content normal.         Judgment: Judgment normal.                 Radiology:          MRI Lumbar Spine Without Contrast    Result Date: 1/14/2025  1. Degenerative disc disease and facet arthropathy with stenosis noted at the L3/4 through L5/S1 levels as detailed above. 2. No fracture identified. No acute appearing disc herniations.  This report was finalized on 1/14/2025 2:40 PM by Dr. Harshal Huddleston MD.        Study Result    Narrative & Impression   EXAMINATION: CT LUMBAR SPINE WO CONTRAST-      CLINICAL INDICATION: severe left lower back pain        COMPARISON: 03/15/2019 procedure:  Axial images through the lumbar spine were acquired without any IV  contrast. Reformatted images were created.     FINDINGS:  There is preservation of the vertebral body heights     Disc space narrowing at L5-S1 with vacuum phenomena.     Mild facet arthropathy.     IMPRESSION:  1. Mild arthritic change, overall very similar today compared to the  previous study.  2. No acute fracture     This report was finalized on 3/16/2022 6:17 PM by Dr. Hossein Shine MD.         Assessment/Plan        ICD-10-CM ICD-9-CM   1. Lumbar radiculopathy  M54.16 724.4       54-year-old female with notable lower lumbar back pain radiculopathy with bilateral lower extremity pain and symptoms.  There is minimal concern for bilateral knee osteoarthritis being source behind her pain and symptoms specifically given the radicular component pain and symptoms.  She has failed conservative treatment options as previously referenced in relation to the lumbar spine.  She remains recalcitrant to conservative treatment options with continued ongoing lumbar radicular component pain and symptoms and facet joint arthropathy.  As result of this the patient was given a referral to neurosurgery for further consultation.  Patient will follow-up as needed               This document was signed by Iban Botello PA-C January 20, 2025    CC: Gabby Fishman APRN      Dictated Utilizing Dragon Dictation:   Please note that portions of this note were completed with a voice recognition program.   Part of this note may be an electronic transcription/translation of spoken language to printed text using the Dragon Dictation System.

## 2025-02-19 ENCOUNTER — OFFICE VISIT (OUTPATIENT)
Dept: NEUROSURGERY | Facility: CLINIC | Age: 55
End: 2025-02-19
Payer: COMMERCIAL

## 2025-02-19 ENCOUNTER — HOSPITAL ENCOUNTER (OUTPATIENT)
Dept: GENERAL RADIOLOGY | Facility: HOSPITAL | Age: 55
Discharge: HOME | End: 2025-02-19
Admitting: NEUROLOGICAL SURGERY
Payer: COMMERCIAL

## 2025-02-19 VITALS — HEIGHT: 67 IN | BODY MASS INDEX: 38.58 KG/M2 | TEMPERATURE: 96.6 F | WEIGHT: 245.8 LBS

## 2025-02-19 DIAGNOSIS — Z72.0 TOBACCO ABUSE: ICD-10-CM

## 2025-02-19 DIAGNOSIS — M51.362 DEGENERATION OF INTERVERTEBRAL DISC OF LUMBAR REGION WITH DISCOGENIC BACK PAIN AND LOWER EXTREMITY PAIN: ICD-10-CM

## 2025-02-19 DIAGNOSIS — M54.9 MECHANICAL BACK PAIN: ICD-10-CM

## 2025-02-19 DIAGNOSIS — M54.9 MECHANICAL BACK PAIN: Primary | ICD-10-CM

## 2025-02-19 PROCEDURE — 1159F MED LIST DOCD IN RCRD: CPT | Performed by: NEUROLOGICAL SURGERY

## 2025-02-19 PROCEDURE — 72120 X-RAY BEND ONLY L-S SPINE: CPT

## 2025-02-19 PROCEDURE — 1160F RVW MEDS BY RX/DR IN RCRD: CPT | Performed by: NEUROLOGICAL SURGERY

## 2025-02-19 PROCEDURE — 99213 OFFICE O/P EST LOW 20 MIN: CPT | Performed by: NEUROLOGICAL SURGERY

## 2025-02-19 RX ORDER — LISINOPRIL 5 MG/1
5 TABLET ORAL DAILY
COMMUNITY
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
## 2025-03-10 ENCOUNTER — TELEPHONE (OUTPATIENT)
Dept: ORTHOPEDIC SURGERY | Facility: CLINIC | Age: 55
End: 2025-03-10
Payer: COMMERCIAL

## 2025-03-10 NOTE — TELEPHONE ENCOUNTER
Received a fax saying the patient picked up the naproxen and meloxicam. Called patient and made sure she was not taking both at the same time per Iban. Patient stated she is not taking the naproxen only the meloxicam.

## 2025-04-19 ENCOUNTER — HOSPITAL ENCOUNTER (EMERGENCY)
Facility: HOSPITAL | Age: 55
Discharge: HOME | End: 2025-04-19
Attending: EMERGENCY MEDICINE
Payer: COMMERCIAL

## 2025-04-19 VITALS
RESPIRATION RATE: 15 BRPM | SYSTOLIC BLOOD PRESSURE: 130 MMHG | BODY MASS INDEX: 39.87 KG/M2 | TEMPERATURE: 98.6 F | HEIGHT: 67 IN | WEIGHT: 254 LBS | OXYGEN SATURATION: 95 % | DIASTOLIC BLOOD PRESSURE: 79 MMHG | HEART RATE: 85 BPM

## 2025-04-19 DIAGNOSIS — N39.0 ACUTE LOWER UTI: Primary | ICD-10-CM

## 2025-04-19 LAB
ALBUMIN SERPL-MCNC: 4.4 G/DL (ref 3.5–5.2)
ALBUMIN/GLOB SERPL: 1.3 G/DL
ALP SERPL-CCNC: 107 U/L (ref 39–117)
ALT SERPL W P-5'-P-CCNC: 6 U/L (ref 1–33)
ANION GAP SERPL CALCULATED.3IONS-SCNC: 8.6 MMOL/L (ref 5–15)
AST SERPL-CCNC: 17 U/L (ref 1–32)
BACTERIA UR QL AUTO: ABNORMAL /HPF
BASOPHILS # BLD AUTO: 0.05 10*3/MM3 (ref 0–0.2)
BASOPHILS NFR BLD AUTO: 0.5 % (ref 0–1.5)
BILIRUB SERPL-MCNC: 0.7 MG/DL (ref 0–1.2)
BILIRUB UR QL STRIP: NEGATIVE
BUN SERPL-MCNC: 16 MG/DL (ref 6–20)
BUN/CREAT SERPL: 13.3 (ref 7–25)
CALCIUM SPEC-SCNC: 9.7 MG/DL (ref 8.6–10.5)
CHLORIDE SERPL-SCNC: 107 MMOL/L (ref 98–107)
CLARITY UR: CLEAR
CO2 SERPL-SCNC: 24.4 MMOL/L (ref 22–29)
COLOR UR: YELLOW
CREAT SERPL-MCNC: 1.2 MG/DL (ref 0.57–1)
DEPRECATED RDW RBC AUTO: 40.7 FL (ref 37–54)
EGFRCR SERPLBLD CKD-EPI 2021: 54.6 ML/MIN/1.73
EOSINOPHIL # BLD AUTO: 0.17 10*3/MM3 (ref 0–0.4)
EOSINOPHIL NFR BLD AUTO: 1.6 % (ref 0.3–6.2)
ERYTHROCYTE [DISTWIDTH] IN BLOOD BY AUTOMATED COUNT: 12.5 % (ref 12.3–15.4)
GLOBULIN UR ELPH-MCNC: 3.4 GM/DL
GLUCOSE SERPL-MCNC: 100 MG/DL (ref 65–99)
GLUCOSE UR STRIP-MCNC: NEGATIVE MG/DL
HCT VFR BLD AUTO: 48.7 % (ref 34–46.6)
HGB BLD-MCNC: 15.6 G/DL (ref 12–15.9)
HGB UR QL STRIP.AUTO: ABNORMAL
HYALINE CASTS UR QL AUTO: ABNORMAL /LPF
IMM GRANULOCYTES # BLD AUTO: 0.04 10*3/MM3 (ref 0–0.05)
IMM GRANULOCYTES NFR BLD AUTO: 0.4 % (ref 0–0.5)
KETONES UR QL STRIP: NEGATIVE
LEUKOCYTE ESTERASE UR QL STRIP.AUTO: ABNORMAL
LYMPHOCYTES # BLD AUTO: 0.98 10*3/MM3 (ref 0.7–3.1)
LYMPHOCYTES NFR BLD AUTO: 9.3 % (ref 19.6–45.3)
MCH RBC QN AUTO: 28.2 PG (ref 26.6–33)
MCHC RBC AUTO-ENTMCNC: 32 G/DL (ref 31.5–35.7)
MCV RBC AUTO: 88.1 FL (ref 79–97)
MONOCYTES # BLD AUTO: 0.71 10*3/MM3 (ref 0.1–0.9)
MONOCYTES NFR BLD AUTO: 6.7 % (ref 5–12)
NEUTROPHILS NFR BLD AUTO: 8.57 10*3/MM3 (ref 1.7–7)
NEUTROPHILS NFR BLD AUTO: 81.5 % (ref 42.7–76)
NITRITE UR QL STRIP: POSITIVE
NRBC BLD AUTO-RTO: 0 /100 WBC (ref 0–0.2)
PH UR STRIP.AUTO: 5.5 [PH] (ref 5–8)
PLATELET # BLD AUTO: 276 10*3/MM3 (ref 140–450)
PMV BLD AUTO: 9.7 FL (ref 6–12)
POTASSIUM SERPL-SCNC: 3.9 MMOL/L (ref 3.5–5.2)
PROT SERPL-MCNC: 7.8 G/DL (ref 6–8.5)
PROT UR QL STRIP: NEGATIVE
RBC # BLD AUTO: 5.53 10*6/MM3 (ref 3.77–5.28)
RBC # UR STRIP: ABNORMAL /HPF
REF LAB TEST METHOD: ABNORMAL
SODIUM SERPL-SCNC: 140 MMOL/L (ref 136–145)
SP GR UR STRIP: 1.01 (ref 1–1.03)
SQUAMOUS #/AREA URNS HPF: ABNORMAL /HPF
UROBILINOGEN UR QL STRIP: ABNORMAL
WBC # UR STRIP: ABNORMAL /HPF
WBC NRBC COR # BLD AUTO: 10.52 10*3/MM3 (ref 3.4–10.8)

## 2025-04-19 PROCEDURE — 81001 URINALYSIS AUTO W/SCOPE: CPT | Performed by: EMERGENCY MEDICINE

## 2025-04-19 PROCEDURE — 25010000002 CEFTRIAXONE PER 250 MG: Performed by: EMERGENCY MEDICINE

## 2025-04-19 PROCEDURE — 96375 TX/PRO/DX INJ NEW DRUG ADDON: CPT

## 2025-04-19 PROCEDURE — 80053 COMPREHEN METABOLIC PANEL: CPT | Performed by: EMERGENCY MEDICINE

## 2025-04-19 PROCEDURE — 25810000003 SODIUM CHLORIDE 0.9 % SOLUTION: Performed by: EMERGENCY MEDICINE

## 2025-04-19 PROCEDURE — 99283 EMERGENCY DEPT VISIT LOW MDM: CPT

## 2025-04-19 PROCEDURE — 25010000002 ONDANSETRON PER 1 MG: Performed by: EMERGENCY MEDICINE

## 2025-04-19 PROCEDURE — 96365 THER/PROPH/DIAG IV INF INIT: CPT

## 2025-04-19 PROCEDURE — 85025 COMPLETE CBC W/AUTO DIFF WBC: CPT | Performed by: EMERGENCY MEDICINE

## 2025-04-19 PROCEDURE — 96376 TX/PRO/DX INJ SAME DRUG ADON: CPT

## 2025-04-19 RX ORDER — ONDANSETRON 2 MG/ML
4 INJECTION INTRAMUSCULAR; INTRAVENOUS ONCE
Status: COMPLETED | OUTPATIENT
Start: 2025-04-19 | End: 2025-04-19

## 2025-04-19 RX ORDER — CEFDINIR 300 MG/1
300 CAPSULE ORAL 2 TIMES DAILY
Qty: 12 CAPSULE | Refills: 0 | Status: SHIPPED | OUTPATIENT
Start: 2025-04-19

## 2025-04-19 RX ORDER — ONDANSETRON 4 MG/1
4 TABLET, ORALLY DISINTEGRATING ORAL EVERY 8 HOURS PRN
Qty: 14 TABLET | Refills: 0 | Status: SHIPPED | OUTPATIENT
Start: 2025-04-19

## 2025-04-19 RX ADMIN — ONDANSETRON 4 MG: 2 INJECTION INTRAMUSCULAR; INTRAVENOUS at 19:20

## 2025-04-19 RX ADMIN — ONDANSETRON 4 MG: 2 INJECTION INTRAMUSCULAR; INTRAVENOUS at 14:50

## 2025-04-19 RX ADMIN — CEFTRIAXONE 2000 MG: 2 INJECTION, POWDER, FOR SOLUTION INTRAMUSCULAR; INTRAVENOUS at 15:55

## 2025-04-19 RX ADMIN — SODIUM CHLORIDE 1000 ML: 9 INJECTION, SOLUTION INTRAVENOUS at 14:50

## 2025-04-19 NOTE — ED NOTES
2 RN skin note    Skin checked with ANN Cotto. Right shoulder abrasion and left fem cath site. Cath site with no hematoma, gauze is saturated. No other skin issues.    Belongings include black shoes, black leggings, black and blue striped socks and gray sweater.    Patient has taken home supply of pain medications at this time. Patient is waiting for family to arrive to ED to pick her up for transportation home.

## 2025-04-19 NOTE — ED PROVIDER NOTES
Subjective   History of Present Illness  Presents to ER with vomiting, recently dx urinary tract infection        Review of Systems   Constitutional:  Positive for activity change, appetite change and fatigue.   HENT: Negative.     Eyes: Negative.    Respiratory: Negative.     Cardiovascular: Negative.    Gastrointestinal:  Positive for nausea and vomiting.   Endocrine: Negative.    Musculoskeletal:  Positive for back pain.   Allergic/Immunologic: Negative.    Neurological: Negative.    Hematological: Negative.    Psychiatric/Behavioral: Negative.         Past Medical History:   Diagnosis Date    Ankle sprain     Anxiety and depression     Arthritis     Arthritis of back 1996    Arthritis of neck     Back pain     Body piercing     both ears    Cancer     MELANOMA-right breast, back    Diabetes mellitus     Diverticulitis     Fracture of ankle     GERD (gastroesophageal reflux disease)     H/O bladder infections     Hiatal hernia     Hip arthrosis     History of being tatooed     Hot flashes     Hypertension     Kidney stones     Knee sprain     Knee swelling     Low back strain     Lumbosacral disc disease     Menstrual changes     Pelvic pain     Pneumonia     PONV (postoperative nausea and vomiting)     Urinary incontinence     Vitamin D deficiency     Wears glasses     for reading       Allergies   Allergen Reactions    Penicillins Anaphylaxis       Past Surgical History:   Procedure Laterality Date    ABDOMINAL SURGERY      BLADDER SURGERY      sling x 2     SECTION      x1    CHOLECYSTECTOMY      COLONOSCOPY      COLONOSCOPY N/A 2023    Procedure: COLONOSCOPY FOR SCREENING;  Surgeon: Enid Aviles MD;  Location: [facility];  Service: Gastroenterology;  Laterality: N/A;    CYSTOSCOPY BOTOX INJECTION OF BLADDER N/A 3/13/2023    Procedure: Cystoscopy Botox injection of bladder;  Surgeon: Steven Wayne MD;  Location: [facility];  Service: Urology;  Laterality: N/A;    CYSTOSCOPY  URETEROSCOPY LASER LITHOTRIPSY Right 2021    Procedure: CYSTOSCOPY URETEROSCOPY RETROGRADE RIGHT, URETEROGRAM;  Surgeon: Steven Wayne MD;  Location:  [facility];  Service: Urology;  Laterality: Right;    ENDOSCOPY      ENDOSCOPY N/A 2023    Procedure: ESOPHAGOGASTRODUODENOSCOPY WITH BIOPSY;  Surgeon: Enid Aviles MD;  Location:  [facility];  Service: Gastroenterology;  Laterality: N/A;    INTERSTIM REMOVAL N/A 10/23/2019    Procedure: INTERSTIM REMOVAL;  Surgeon: Jose Adams MD;  Location:  [facility];  Service: Urology    SEPTOPLASTY      SKIN BIOPSY      TONSILLECTOMY AND ADENOIDECTOMY      TUBAL ABDOMINAL LIGATION         Family History   Problem Relation Age of Onset    Diabetes Mother     Arthritis Mother     Cancer Mother         renal cell carconma    Diabetes Father     Hypertension Father     Asthma Son     Breast cancer Neg Hx        Social History     Socioeconomic History    Marital status:    Tobacco Use    Smoking status: Former     Current packs/day: 0.00     Average packs/day: 0.5 packs/day for 30.0 years (15.0 ttl pk-yrs)     Types: Cigarettes     Start date: 1992     Quit date: 2022     Years since quittin.6    Smokeless tobacco: Never   Vaping Use    Vaping status: Every Day    Substances: Nicotine, Flavoring    Devices: Refillable tank    Passive vaping exposure: Yes   Substance and Sexual Activity    Alcohol use: No    Drug use: No    Sexual activity: Yes     Partners: Male     Birth control/protection: Tubal ligation           Objective   Physical Exam  Vitals and nursing note reviewed.   Constitutional:       Appearance: Normal appearance.   HENT:      Head: Normocephalic.      Nose: Nose normal.      Mouth/Throat:      Mouth: Mucous membranes are moist.   Eyes:      Pupils: Pupils are equal, round, and reactive to light.   Cardiovascular:      Rate and Rhythm: Normal rate.   Pulmonary:      Effort: Pulmonary effort is normal.    Abdominal:      General: Abdomen is flat.   Musculoskeletal:         General: Swelling present.      Cervical back: Normal range of motion.   Skin:     General: Skin is dry.   Neurological:      General: No focal deficit present.      Mental Status: She is alert.         Procedures           ED Course                                                       Medical Decision Making  Problems Addressed:  Acute lower UTI: complicated acute illness or injury    Amount and/or Complexity of Data Reviewed  Labs: ordered.    Risk  Prescription drug management.        Final diagnoses:   Acute lower UTI       ED Disposition  ED Disposition       ED Disposition   Discharge    Condition   Stable    Comment   --               Gabby Fishman R, APRN  [address] [facility] [address]  [place] [address]  [phone]    Schedule an appointment as soon as possible for a visit   If symptoms worsen         Medication List        New Prescriptions      cefdinir 300 MG capsule  Commonly known as: OMNICEF  Take 1 capsule by mouth 2 (Two) Times a Day.     ondansetron ODT 4 MG disintegrating tablet  Commonly known as: ZOFRAN-ODT  Place 1 tablet on the tongue Every 8 (Eight) Hours As Needed for Nausea or Vomiting.               Where to Get Your Medications        These medications were sent to [address], [address] [phone] [place] [phone] [address], [place] [id number]      Phone: [phone]   cefdinir 300 MG capsule  ondansetron ODT 4 MG disintegrating tablet            Angel Ramos MD  04/19/25 8600

## 2025-05-16 ENCOUNTER — OFFICE VISIT (OUTPATIENT)
Dept: NEUROSURGERY | Facility: CLINIC | Age: 55
End: 2025-05-16
Payer: COMMERCIAL

## 2025-05-16 VITALS — HEIGHT: 67 IN | BODY MASS INDEX: 39.16 KG/M2 | WEIGHT: 249.5 LBS | TEMPERATURE: 97.7 F

## 2025-05-16 DIAGNOSIS — M43.10 SPONDYLOLISTHESIS, ACQUIRED: Primary | ICD-10-CM

## 2025-05-16 DIAGNOSIS — M54.9 MECHANICAL BACK PAIN: ICD-10-CM

## 2025-05-16 DIAGNOSIS — M51.362 DEGENERATION OF INTERVERTEBRAL DISC OF LUMBAR REGION WITH DISCOGENIC BACK PAIN AND LOWER EXTREMITY PAIN: ICD-10-CM

## 2025-05-16 RX ORDER — NITROFURANTOIN 25; 75 MG/1; MG/1
CAPSULE ORAL
COMMUNITY
Start: 2025-05-08

## 2025-05-16 RX ORDER — CHLORHEXIDINE GLUCONATE 40 MG/ML
SOLUTION TOPICAL
Qty: 120 ML | Refills: 0 | Status: SHIPPED | OUTPATIENT
Start: 2025-05-16

## 2025-05-16 RX ORDER — ONDANSETRON 4 MG/1
TABLET, FILM COATED ORAL
COMMUNITY
Start: 2025-02-25

## 2025-05-16 RX ORDER — SULFAMETHOXAZOLE AND TRIMETHOPRIM 800; 160 MG/1; MG/1
TABLET ORAL
COMMUNITY
Start: 2025-05-15

## 2025-05-16 RX ORDER — FAMOTIDINE 10 MG
20 TABLET ORAL
OUTPATIENT
Start: 2025-05-16

## 2025-05-16 RX ORDER — FOLIC ACID 1 MG/1
TABLET ORAL
COMMUNITY

## 2025-05-16 RX ORDER — HYDROCODONE BITARTRATE AND ACETAMINOPHEN 7.5; 325 MG/1; MG/1
TABLET ORAL
COMMUNITY
Start: 2025-03-27

## 2025-05-16 RX ORDER — PHENAZOPYRIDINE HYDROCHLORIDE 100 MG/1
TABLET, FILM COATED ORAL
COMMUNITY
Start: 2025-03-20

## 2025-05-16 RX ORDER — HYDROXYZINE PAMOATE 25 MG/1
CAPSULE ORAL
COMMUNITY

## 2025-05-16 RX ORDER — GABAPENTIN 300 MG/1
CAPSULE ORAL
COMMUNITY
Start: 2025-04-24

## 2025-05-16 RX ORDER — POLYETHYLENE GLYCOL 3350 17 G/17G
POWDER ORAL
COMMUNITY
Start: 2025-04-30
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
## 2025-06-30 ENCOUNTER — TELEPHONE (OUTPATIENT)
Dept: NEUROSURGERY | Facility: CLINIC | Age: 55
End: 2025-06-30
Payer: COMMERCIAL

## 2025-06-30 NOTE — TELEPHONE ENCOUNTER
Caller: IGOR    Relationship: SELF    Best call back number: [phone]    What is the best time to reach you: ANYTIME    Who are you requesting to speak with (clinical staff, provider,  specific staff member): CLINICAL    Do you know the name of the person who called:     What was the call regarding: PATIENT CALLED WANTING TO KNOW WHEN HER SX W DR ASCENCIO IS GOING TO BE SCHEDULED    PLEASE ADVISE  THANK YOU

## 2025-07-01 PROBLEM — M43.10 SPONDYLOLISTHESIS, ACQUIRED: Status: ACTIVE | Noted: 2025-05-16

## 2025-07-28 ENCOUNTER — PRE-ADMISSION TESTING (OUTPATIENT)
Dept: PREADMISSION TESTING | Facility: HOSPITAL | Age: 55
End: 2025-07-28
Payer: COMMERCIAL

## 2025-07-28 VITALS — WEIGHT: 262.46 LBS | HEIGHT: 67 IN | BODY MASS INDEX: 41.19 KG/M2

## 2025-07-28 DIAGNOSIS — M43.10 SPONDYLOLISTHESIS, ACQUIRED: ICD-10-CM

## 2025-07-28 LAB
ANION GAP SERPL CALCULATED.3IONS-SCNC: 8.9 MMOL/L (ref 5–15)
BASOPHILS # BLD AUTO: 0.1 10*3/MM3 (ref 0–0.2)
BASOPHILS NFR BLD AUTO: 1 % (ref 0–1.5)
BUN SERPL-MCNC: 9.6 MG/DL (ref 6–20)
BUN/CREAT SERPL: 7.9 (ref 7–25)
CALCIUM SPEC-SCNC: 9.1 MG/DL (ref 8.6–10.5)
CHLORIDE SERPL-SCNC: 109 MMOL/L (ref 98–107)
CO2 SERPL-SCNC: 23.1 MMOL/L (ref 22–29)
CREAT SERPL-MCNC: 1.22 MG/DL (ref 0.57–1)
DEPRECATED RDW RBC AUTO: 43 FL (ref 37–54)
EGFRCR SERPLBLD CKD-EPI 2021: 53.5 ML/MIN/1.73
EOSINOPHIL # BLD AUTO: 0.4 10*3/MM3 (ref 0–0.4)
EOSINOPHIL NFR BLD AUTO: 3.8 % (ref 0.3–6.2)
ERYTHROCYTE [DISTWIDTH] IN BLOOD BY AUTOMATED COUNT: 13.3 % (ref 12.3–15.4)
GLUCOSE SERPL-MCNC: 104 MG/DL (ref 65–99)
HBA1C MFR BLD: 5.66 % (ref 4.8–5.6)
HCT VFR BLD AUTO: 43 % (ref 34–46.6)
HGB BLD-MCNC: 13.8 G/DL (ref 12–15.9)
IMM GRANULOCYTES # BLD AUTO: 0.09 10*3/MM3 (ref 0–0.05)
IMM GRANULOCYTES NFR BLD AUTO: 0.9 % (ref 0–0.5)
LYMPHOCYTES # BLD AUTO: 3.76 10*3/MM3 (ref 0.7–3.1)
LYMPHOCYTES NFR BLD AUTO: 35.8 % (ref 19.6–45.3)
MCH RBC QN AUTO: 28.3 PG (ref 26.6–33)
MCHC RBC AUTO-ENTMCNC: 32.1 G/DL (ref 31.5–35.7)
MCV RBC AUTO: 88.3 FL (ref 79–97)
MONOCYTES # BLD AUTO: 1.06 10*3/MM3 (ref 0.1–0.9)
MONOCYTES NFR BLD AUTO: 10.1 % (ref 5–12)
NEUTROPHILS NFR BLD AUTO: 48.4 % (ref 42.7–76)
NEUTROPHILS NFR BLD AUTO: 5.08 10*3/MM3 (ref 1.7–7)
NRBC BLD AUTO-RTO: 0 /100 WBC (ref 0–0.2)
PLATELET # BLD AUTO: 265 10*3/MM3 (ref 140–450)
PMV BLD AUTO: 10.7 FL (ref 6–12)
POTASSIUM SERPL-SCNC: 4 MMOL/L (ref 3.5–5.2)
QT INTERVAL: 410 MS
QTC INTERVAL: 439 MS
RBC # BLD AUTO: 4.87 10*6/MM3 (ref 3.77–5.28)
SODIUM SERPL-SCNC: 141 MMOL/L (ref 136–145)
WBC NRBC COR # BLD AUTO: 10.49 10*3/MM3 (ref 3.4–10.8)

## 2025-07-28 PROCEDURE — 36415 COLL VENOUS BLD VENIPUNCTURE: CPT

## 2025-07-28 PROCEDURE — 83036 HEMOGLOBIN GLYCOSYLATED A1C: CPT

## 2025-07-28 PROCEDURE — 85025 COMPLETE CBC W/AUTO DIFF WBC: CPT

## 2025-07-28 PROCEDURE — 87081 CULTURE SCREEN ONLY: CPT

## 2025-07-28 PROCEDURE — 80048 BASIC METABOLIC PNL TOTAL CA: CPT

## 2025-07-28 PROCEDURE — 93005 ELECTROCARDIOGRAM TRACING: CPT

## 2025-07-28 RX ORDER — LORATADINE 10 MG/1
TABLET ORAL EVERY 24 HOURS
Status: ON HOLD | COMMUNITY
Start: 2025-07-14 | End: 2025-08-13

## 2025-07-28 RX ORDER — FLUTICASONE PROPIONATE 50 MCG
SPRAY, SUSPENSION (ML) NASAL EVERY 12 HOURS SCHEDULED
Status: ON HOLD | COMMUNITY
Start: 2025-07-14

## 2025-07-28 RX ORDER — KETOCONAZOLE 20 MG/G
CREAM TOPICAL
Status: ON HOLD | COMMUNITY
Start: 2025-06-30

## 2025-07-28 RX ORDER — ESCITALOPRAM OXALATE 20 MG/1
TABLET ORAL EVERY 24 HOURS
Status: ON HOLD | COMMUNITY

## 2025-07-28 RX ORDER — TRAZODONE HYDROCHLORIDE 100 MG/1
TABLET ORAL EVERY 24 HOURS
Status: ON HOLD | COMMUNITY

## 2025-07-28 NOTE — PAT
An arrival time for procedure was not provided during PAT visit. If patient had any questions or concerns about their arrival time, they were instructed to contact their surgeon/physician.  Additionally, if the patient referred to an arrival time that was acquired from their my chart account, patient was encouraged to verify that time with their surgeon/physician. Arrival times are NOT provided in Pre Admission Testing Department.    Patient viewed general PAT education video as instructed in their preoperative information received from their surgeon.  Patient stated the general PAT education video was viewed in its entirety and survey completed.  Copies of PAT general education handouts (Incentive Spirometry, Meds to Beds Program, Patient Belongings, Pre-op skin preparation instructions, Blood Glucose testing, Visitor policy, Surgery FAQ, Code H) distributed to patient if not printed. Education related to the PAT pass and skin preparation for surgery (if applicable) completed in PAT as a reinforcement to PAT education video. Patient instructed to return PAT pass provided today as well as completed skin preparation sheet (if applicable) on the day of procedure.     Additionally if patient had not viewed video yet but intended to view it at home or in our waiting area, then referred them to the handout with QR code/link provided during PAT visit.  Encouraged patient/family to read PAT general education handouts thoroughly and notify PAT staff with any questions or concerns. Patient verbalized understanding of all information and priority content.    Patient denies any current skin issues.     Bactroban (if prescribed) and Chlorhexidine Prescription prescribed by physician before PAT visit.  Verified with patient that medication(s) were picked up from their pharmacy.  Written instructions given to patient during PAT visit.  Patient/family also instructed to complete skin prep checklist and return the checklist on the  day of surgery to preoperative staff.  Patient/family verbalized understanding.    Patient to apply Chlorhexadine wipes  to surgical area (as instructed) the night before procedure and the AM of procedure. Wipes provided.    Patient instructed to drink 20 ounces of Gatorade or Gatorlyte (if diabetic) and it needs to be completed 1 hour (for Main OR patients) or 2 hours (scheduled  section & [facility] patients) before given arrival time for procedure (NO RED Gatorade and NO Gatorade Zero).    Patient verbalized understanding.    EKG IN CHART.    PATIENT DIRECTED TO 7TH FLOOR LAB FOR RECOLLECT- HARD STICK

## 2025-07-29 LAB — MRSA SPEC QL CULT: NORMAL

## 2025-07-31 ENCOUNTER — OFFICE VISIT (OUTPATIENT)
Dept: UROLOGY | Facility: CLINIC | Age: 55
End: 2025-07-31
Payer: COMMERCIAL

## 2025-07-31 VITALS
DIASTOLIC BLOOD PRESSURE: 83 MMHG | HEIGHT: 67 IN | BODY MASS INDEX: 41.12 KG/M2 | WEIGHT: 262 LBS | SYSTOLIC BLOOD PRESSURE: 132 MMHG | HEART RATE: 79 BPM

## 2025-07-31 DIAGNOSIS — N39.0 E-COLI UTI: ICD-10-CM

## 2025-07-31 DIAGNOSIS — Z80.51 FAMILY HISTORY OF RENAL CELL CARCINOMA: ICD-10-CM

## 2025-07-31 DIAGNOSIS — N30.01 ACUTE CYSTITIS WITH HEMATURIA: Primary | ICD-10-CM

## 2025-07-31 DIAGNOSIS — B96.20 E-COLI UTI: ICD-10-CM

## 2025-07-31 DIAGNOSIS — R31.29 OTHER MICROSCOPIC HEMATURIA: ICD-10-CM

## 2025-07-31 RX ORDER — L. RHAMNOSUS GG/INULIN 20B-200 MG
1 CAPSULE ORAL DAILY
Qty: 90 CAPSULE | Refills: 3 | Status: ON HOLD | OUTPATIENT
Start: 2025-07-31

## 2025-07-31 NOTE — PROGRESS NOTES
"Chief Complaint:    Chief Complaint   Patient presents with    Urinary Tract Infection       Vital Signs:   /83 (BP Location: Left arm, Patient Position: Sitting)   Pulse 79   Ht 170.2 cm (67.01\")   Wt 119 kg (262 lb)   BMI 41.03 kg/m²   Body mass index is 41.03 kg/m².      HPI:  Briseyda Edouard is a 54 y.o. female who presents today for follow up    History of Present Illness  Ms. Wilson returns to the clinic today for follow-up for urinary tract infection and microscopic hematuria.  She has been seen previously in office several times for nephrolithiasis by Dr. Wayne as well as detrusor instability.  She was last seen in 2023 and had Botox injections at that time that improved her lower urinary tract symptoms.  Unfortunately due to lapse in insurance she has not been seen since.  She returns today with concerns of recurrent urinary tract infections.  She reports having roughly 4-5 infections over the past 2 months.  I do not have any cultures on file.  Patient does state 1 of these was E. coli.  She does endorse a family history of renal cell carcinoma and however has had a negative workup including a CT scan of the abdomen pelvis and cystoscopy previously.  Urine analysis in office today did showed 1+ microscopic blood which she reports has been present since she was young.  She denies any current dysuria, fever, chills, inability to urinate, or frequency/urgency.  She is scheduled undergo a L4/5 surgical infusion in August      Past Medical History:  Past Medical History:   Diagnosis Date    Ankle sprain     Anxiety and depression     Arthritis     Arthritis of back 1996    Arthritis of neck     Back pain     Body piercing     both ears    Cancer     MELANOMA-right breast, back    Cervical disc disorder     Diabetes mellitus     PREDIABETIC    Diverticulitis     Fracture of ankle     GERD (gastroesophageal reflux disease)     H/O bladder infections     Hiatal hernia     Hip arthrosis     " History of being tatooed     Hot flashes     Hypertension     Kidney stones     Knee sprain     Knee swelling     Low back pain     Low back strain     Lumbosacral disc disease     Menstrual changes     Pelvic pain     Pneumonia     PONV (postoperative nausea and vomiting)     Urinary incontinence     Vitamin D deficiency     Wears glasses     for reading       Current Meds:  Current Outpatient Medications   Medication Sig Dispense Refill    Chlorhexidine Gluconate 4 % solution Shower each day with solution for 5 days beginning 5 days before surgery. 120 mL 0    escitalopram (LEXAPRO) 20 MG tablet Daily.      fluticasone (FLONASE) 50 MCG/ACT nasal spray Every 12 (Twelve) Hours.      gabapentin (NEURONTIN) 300 MG capsule Take 1 capsule twice a day by oral route as directed for 30 days.      HYDROcodone-acetaminophen (NORCO) 7.5-325 MG per tablet Take 1 tablet every 8 hours by oral route as directed for 30 days, for pain.      hydrOXYzine pamoate (VISTARIL) 25 MG capsule       ketoconazole (NIZORAL) 2 % cream       lisinopril (PRINIVIL,ZESTRIL) 5 MG tablet Take 1 tablet by mouth Daily.      loratadine (CLARITIN) 10 MG tablet Daily.      mupirocin (BACTROBAN) 2 % nasal ointment Apply to the inside of each nostril with a cotton swab two times daily, morning and evening, for 5 days before surgery. 10 each 0    pantoprazole (PROTONIX) 40 MG EC tablet Take 1 tablet by mouth Daily. 30 tablet 0    Progesterone (PROMETRIUM) 200 MG capsule Take 1 capsule by mouth Daily.      traZODone (DESYREL) 100 MG tablet Daily.      vitamin D (ERGOCALCIFEROL) 38976 units capsule capsule Take 1 capsule by mouth Every 7 (Seven) Days. Taking on wednesdays      Lactobacillus-Inulin (Probiotic Digestive Support) capsule Take 1 capsule by mouth Daily. 90 capsule 3     No current facility-administered medications for this visit.        Allergies:   Allergies   Allergen Reactions    Penicillins Unknown - Low Severity     CHILDHOOD REACTION.           Past Surgical History:  Past Surgical History:   Procedure Laterality Date    BLADDER SURGERY      sling x 2     SECTION      x1    CHOLECYSTECTOMY      COLONOSCOPY      COLONOSCOPY N/A 2023    Procedure: COLONOSCOPY FOR SCREENING;  Surgeon: Enid Aviles MD;  Location: [facility];  Service: Gastroenterology;  Laterality: N/A;    CYSTOSCOPY BOTOX INJECTION OF BLADDER N/A 2023    Procedure: Cystoscopy Botox injection of bladder;  Surgeon: Steven Wayne MD;  Location: [facility];  Service: Urology;  Laterality: N/A;    CYSTOSCOPY URETEROSCOPY LASER LITHOTRIPSY Right 2021    Procedure: CYSTOSCOPY URETEROSCOPY RETROGRADE RIGHT, URETEROGRAM;  Surgeon: Steven Wayne MD;  Location: [facility];  Service: Urology;  Laterality: Right;    ENDOSCOPY      ENDOSCOPY N/A 2023    Procedure: ESOPHAGOGASTRODUODENOSCOPY WITH BIOPSY;  Surgeon: Enid Aviles MD;  Location: [facility];  Service: Gastroenterology;  Laterality: N/A;    INTERSTIM REMOVAL N/A 10/23/2019    Procedure: INTERSTIM REMOVAL;  Surgeon: Jose Adams MD;  Location: [facility];  Service: Urology    SEPTOPLASTY      SKIN BIOPSY      TONSILLECTOMY AND ADENOIDECTOMY      TUBAL ABDOMINAL LIGATION         Social History:  Social History     Socioeconomic History    Marital status:    Tobacco Use    Smoking status: Former     Average packs/day: 0.5 packs/day for 30.0 years (15.0 ttl pk-yrs)     Types: Cigarettes     Start date: 1992     Quit date: 2022     Years since quittin.9    Smokeless tobacco: Never   Vaping Use    Vaping status: Former    Substances: Nicotine, Flavoring    Devices: Refillable tank    Passive vaping exposure: Yes   Substance and Sexual Activity    Alcohol use: No    Drug use: No    Sexual activity: Defer     Partners: Male     Birth control/protection: Tubal ligation       Family History:  Family History   Problem Relation Age of Onset    Diabetes  Mother     Arthritis Mother          from cancer    Cancer Mother         renal cell carconma    Diabetes Father     Hypertension Father     Hearing loss Father     Hyperlipidemia Father     Asthma Son     Learning disabilities Brother     Breast cancer Neg Hx        Review of Systems:  Review of Systems   Constitutional:  Negative for chills, fatigue and fever.   Respiratory:  Negative for cough, shortness of breath and wheezing.    Cardiovascular:  Negative for leg swelling.   Gastrointestinal:  Negative for abdominal pain, nausea and vomiting.   Genitourinary:  Negative for difficulty urinating, dysuria, flank pain, frequency, pelvic pain and urgency.   Musculoskeletal:  Positive for back pain. Negative for joint swelling.   Allergic/Immunologic: Negative.    Neurological:  Positive for headaches. Negative for dizziness.   Hematological:  Negative for adenopathy.   Psychiatric/Behavioral:  Negative for confusion.        Physical Exam:  Physical Exam  Constitutional:       General: She is not in acute distress.     Appearance: Normal appearance.   HENT:      Head: Normocephalic and atraumatic.      Nose: Nose normal.      Mouth/Throat:      Mouth: Mucous membranes are moist.   Eyes:      Conjunctiva/sclera: Conjunctivae normal.   Cardiovascular:      Rate and Rhythm: Normal rate.      Pulses: Normal pulses.   Pulmonary:      Effort: Pulmonary effort is normal.   Abdominal:      Palpations: Abdomen is soft.   Musculoskeletal:         General: Normal range of motion.      Cervical back: Normal range of motion.   Skin:     General: Skin is warm.   Neurological:      General: No focal deficit present.      Mental Status: She is alert and oriented to person, place, and time.   Psychiatric:         Mood and Affect: Mood normal.         Behavior: Behavior normal.         Thought Content: Thought content normal.         Judgment: Judgment normal.         Recent Image (CT and/or KUB):   CT Abdomen and Pelvis: Results  for orders placed during the hospital encounter of 06/13/17    CT Abdomen Pelvis With & Without Contrast    Narrative  CT ABDOMEN AND PELVIS WITH AND WITHOUT CONTRAST-    CLINICAL INDICATION: Gross hematuria; N39.0-Urinary tract infection,  site not specified; R31.0-Gross hematuria.      DOSE: 3182.06 mGy.cm  COMPARISON: 12/28/2016.    Radiation dose reduction techniques were utilized per ALARA protocol.  Automated exposure control was initiated through either or [employer] or  GOQ[employer] software packages by  protocol.      PROCEDURE: Axial images were acquired from the lung bases through the  pubic symphysis before and after IV contrast. No oral contrast was  administered.    FINDINGS: Lung bases show no pleural effusions.    There is a moderate sized hiatal hernia.    The liver is homogeneous but slightly decreased in attenuation  compatible with fatty infiltration of the liver.    Spleen is homogeneous.    There is no adrenal enlargement.    Kidneys show no hydronephrosis or hydroureter. No evidence of a solid  renal mass.    Cysts are seen on the ovaries bilaterally. The uterus is heterogeneous.    The bladder is somewhat decompressed.    Impression  Heterogeneity of the uterus.  Bilateral adnexal cysts.  Other findings as above.    This report was finalized on 6/13/2017 9:49 AM by Dr. Hossein Shine MD.     CT Stone Protocol: Results for orders placed during the hospital encounter of 08/16/19    CT Abdomen Pelvis Stone Protocol    Narrative  CT ABDOMEN PELVIS STONE PROTOCOL-    REASON FOR EXAM: Hematuria; R31.29-Other microscopic hematuria    COMPARISON: Today's CT is compared with an earlier CT scan done in  06/2017.    FINDINGS: Spiral scans were obtained through the kidneys, ureters, and  bladder. The kidneys are normal in size and shape. There was no  hydronephrosis. There were no stones in the intrarenal collecting  systems. There were no stones noted along the course of the ureters.  Urinary  bladder is smooth in contour.    Impression  No evidence of stone or obstruction was seen involving the  collecting system of either kidney.    846.43 mGy.cm  The radiation dose reduction device was utilized for each scan per the  ALARA (as low as reasonably achievable) protocol.    This report was finalized on 8/16/2019 2:07 PM by Dr. Chapo Rosales II, MD.     KUB: Results for orders placed during the hospital encounter of 08/02/23    XR Abdomen KUB    Narrative  EXAM:  XR Abdomen, 1 View    EXAM DATE:  8/2/2023 8:50 AM    CLINICAL HISTORY:  evaluate stool pattern; K59.04-Chronic idiopathic constipation;  R19.7-Diarrhea, unspecified    TECHNIQUE:  Frontal supine view of the abdomen/pelvis.    COMPARISON:  No relevant prior studies available.    FINDINGS:  GASTROINTESTINAL TRACT:  Scattered to moderate stool throughout the  colon.  No dilation.  BONES/JOINTS:  Unremarkable.    Impression  Scattered to moderate stool throughout the colon.    This report was finalized on 8/2/2023 9:13 AM by Dr. Pablo Del Castillo MD.       Labs:  Brief Urine Lab Results  (Last result in the past 365 days)        Color   Clarity   Blood   Leuk Est   Nitrite   Protein   CREAT   Urine HCG        07/31/25 1300 Yellow   Clear   1+   Negative   Negative   Negative                 Office Visit on 07/31/2025   Component Date Value Ref Range Status    Color 07/31/2025 Yellow  Yellow, Straw, Dark Yellow, Jennifer Final    Clarity, UA 07/31/2025 Clear  Clear Final    Specific Gravity  07/31/2025 1.015  1.005 - 1.030 Final    pH, Urine 07/31/2025 6.0  5.0 - 8.0 Final    Leukocytes 07/31/2025 Negative  Negative Final    Nitrite, UA 07/31/2025 Negative  Negative Final    Protein, POC 07/31/2025 Negative  Negative mg/dL Final    Glucose, UA 07/31/2025 Negative  Negative mg/dL Final    Ketones, UA 07/31/2025 Negative  Negative Final    Urobilinogen, UA 07/31/2025 Normal  Normal, 0.2 E.U./dL Final    Bilirubin 07/31/2025 Negative  Negative Final    Blood,  UA 07/31/2025 1+ (A)  Negative Final    Lot Number 07/31/2025 [id number]   Final    Expiration Date 07/31/2025 52,026   Final   Pre-Admission Testing on 07/28/2025   Component Date Value Ref Range Status    MRSA Screen Cx 07/28/2025 No Methicillin Resistant Staphylococcus aureus isolated   Final    Glucose 07/28/2025 104 (H)  65 - 99 mg/dL Final    BUN 07/28/2025 9.6  6.0 - 20.0 mg/dL Final    Creatinine 07/28/2025 1.22 (H)  0.57 - 1.00 mg/dL Final    Sodium 07/28/2025 141  136 - 145 mmol/L Final    Potassium 07/28/2025 4.0  3.5 - 5.2 mmol/L Final    Chloride 07/28/2025 109 (H)  98 - 107 mmol/L Final    CO2 07/28/2025 23.1  22.0 - 29.0 mmol/L Final    Calcium 07/28/2025 9.1  8.6 - 10.5 mg/dL Final    BUN/Creatinine Ratio 07/28/2025 7.9  7.0 - 25.0 Final    Anion Gap 07/28/2025 8.9  5.0 - 15.0 mmol/L Final    eGFR 07/28/2025 53.5 (L)  >60.0 mL/min/1.73 Final    WBC 07/28/2025 10.49  3.40 - 10.80 10*3/mm3 Final    RBC 07/28/2025 4.87  3.77 - 5.28 10*6/mm3 Final    Hemoglobin 07/28/2025 13.8  12.0 - 15.9 g/dL Final    Hematocrit 07/28/2025 43.0  34.0 - 46.6 % Final    MCV 07/28/2025 88.3  79.0 - 97.0 fL Final    MCH 07/28/2025 28.3  26.6 - 33.0 pg Final    MCHC 07/28/2025 32.1  31.5 - 35.7 g/dL Final    RDW 07/28/2025 13.3  12.3 - 15.4 % Final    RDW-SD 07/28/2025 43.0  37.0 - 54.0 fl Final    MPV 07/28/2025 10.7  6.0 - 12.0 fL Final    Platelets 07/28/2025 265  140 - 450 10*3/mm3 Final    Neutrophil % 07/28/2025 48.4  42.7 - 76.0 % Final    Lymphocyte % 07/28/2025 35.8  19.6 - 45.3 % Final    Monocyte % 07/28/2025 10.1  5.0 - 12.0 % Final    Eosinophil % 07/28/2025 3.8  0.3 - 6.2 % Final    Basophil % 07/28/2025 1.0  0.0 - 1.5 % Final    Immature Grans % 07/28/2025 0.9 (H)  0.0 - 0.5 % Final    Neutrophils, Absolute 07/28/2025 5.08  1.70 - 7.00 10*3/mm3 Final    Lymphocytes, Absolute 07/28/2025 3.76 (H)  0.70 - 3.10 10*3/mm3 Final    Monocytes, Absolute 07/28/2025 1.06 (H)  0.10 - 0.90 10*3/mm3 Final     Eosinophils, Absolute 07/28/2025 0.40  0.00 - 0.40 10*3/mm3 Final    Basophils, Absolute 07/28/2025 0.10  0.00 - 0.20 10*3/mm3 Final    Immature Grans, Absolute 07/28/2025 0.09 (H)  0.00 - 0.05 10*3/mm3 Final    nRBC 07/28/2025 0.0  0.0 - 0.2 /100 WBC Final    QT Interval 07/28/2025 410  ms Final    QTC Interval 07/28/2025 439  ms Final    Hemoglobin A1C 07/28/2025 5.66 (H)  4.80 - 5.60 % Final        Procedure: None  Procedures     I have reviewed and agree with the above PMH, PSH, FMH, social history, medications, allergies, and labs.     Assessment/Plan:   Problem List Items Addressed This Visit       Gross hematuria    Family history of renal cell carcinoma    Urinary tract infection with hematuria - Primary    Relevant Orders    POC Urinalysis Dipstick, Automated (Completed)    NON-GYN CYTOLOGY, [facility] ([facility] only)     Other Visit Diagnoses         E-coli UTI        Relevant Medications    Lactobacillus-Inulin (Probiotic Digestive Support) capsule            Health Maintenance:   Health Maintenance Due   Topic Date Due    DIABETIC FOOT EXAM  Never done    DIABETIC EYE EXAM  Never done    URINE MICROALBUMIN-CREATININE RATIO (uACR)  Never done    Hepatitis B (1 of 3 - 19+ 3-dose series) Never done    Pneumococcal Vaccine 50+ (1 of 2 - PCV) Never done    TDAP/TD VACCINES (1 - Tdap) Never done    PAP SMEAR  Never done    HEPATITIS C SCREENING  Never done    ANNUAL PHYSICAL  Never done    ZOSTER VACCINE (1 of 2) Never done    COVID-19 Vaccine (3 - 2024-25 season) 09/01/2024    MAMMOGRAM  10/14/2024        Smoking Counseling: Former smoker.  Never used smokeless tobacco.  Counseling given.    Urine Incontinence: Patient reports that she is having some mild urinary incontinence.    Patient was given instructions and counseling regarding her condition or for health maintenance advice. Please see specific information pulled into the AVS if appropriate.    Patient Education:   Microscopic hematuria/acute  cystitis -discussed with the patient the pathophysiology of this condition in detail.  Did advise patient appropriate workup for microscopic hematuria which can include a CT scan abdomen pelvis with and without contrast.  Last CT scan in July 2023 was unremarkable.  Lower tract investigation with cystoscopy was unremarkable as well.  Given family history of renal cell carcinoma we will proceed for with a cytology.  If any abnormalities are noted we will follow-up with repeat workup.  She verbalized understanding  E. coli UTI -no recent urine cultures on file but patient does report previous E. coli.  This is consistent with urinary tract infection in July 2023.  And recommend he start a probiotic to help with growth control with normal urogenital jose angel.  No concerns of infection on urine analysis today.  Did advise patient to drop off a urine culture at any time if she has changes in lower urinary tract symptoms.  Otherwise we will place her back in 3 months    Visit Diagnoses:    ICD-10-CM ICD-9-CM   1. Acute cystitis with hematuria  N30.01 595.0   2. E-coli UTI  N39.0 599.0    B96.20 041.49   3. Other microscopic hematuria  R31.29 599.72   4. Family history of renal cell carcinoma  Z80.51 V16.51     A total of 25 minutes were spent coordinating this patient’s care in clinic today; 15 minutes of which were face-to-face with the patient, reviewing medical history and counseling on the current treatment and followup plan.  All questions were answered to patient's satisfaction.    Meds Ordered During Visit:  New Medications Ordered This Visit   Medications    Lactobacillus-Inulin (Probiotic Digestive Support) capsule     Sig: Take 1 capsule by mouth Daily.     Dispense:  90 capsule     Refill:  3       Follow Up Appointment: 3 months  No follow-ups on file.      This document has been electronically signed by Eddie Gomez PA-C   July 31, 2025 16:20 EDT    Part of this note may be an electronic  transcription/translation of spoken language to printed text using the Dragon Dictation System.

## 2025-08-01 ENCOUNTER — RESULTS FOLLOW-UP (OUTPATIENT)
Dept: UROLOGY | Facility: CLINIC | Age: 55
End: 2025-08-01
Payer: COMMERCIAL

## 2025-08-01 LAB — REF LAB TEST METHOD: NORMAL

## 2025-08-01 NOTE — TELEPHONE ENCOUNTER
Call patient advise her cytology showed no concerns of any urothelial carcinomas.  There was some normal squamous, urothelial, and inflammatory cells present.  Recommend to keep follow-up.  She verbalized understanding.

## 2025-08-04 ENCOUNTER — APPOINTMENT (OUTPATIENT)
Dept: GENERAL RADIOLOGY | Facility: HOSPITAL | Age: 55
End: 2025-08-04
Payer: COMMERCIAL

## 2025-08-04 ENCOUNTER — HOSPITAL ENCOUNTER (OUTPATIENT)
Facility: HOSPITAL | Age: 55
Discharge: HOME | End: 2025-08-07
Attending: NEUROLOGICAL SURGERY | Admitting: NEUROLOGICAL SURGERY
Payer: COMMERCIAL

## 2025-08-04 ENCOUNTER — ANESTHESIA (OUTPATIENT)
Dept: PERIOP | Facility: HOSPITAL | Age: 55
End: 2025-08-04
Payer: COMMERCIAL

## 2025-08-04 ENCOUNTER — ANESTHESIA EVENT (OUTPATIENT)
Dept: PERIOP | Facility: HOSPITAL | Age: 55
End: 2025-08-04
Payer: COMMERCIAL

## 2025-08-04 DIAGNOSIS — R10.2 PELVIC PAIN IN FEMALE: ICD-10-CM

## 2025-08-04 DIAGNOSIS — G89.29 CHRONIC BILATERAL LOW BACK PAIN WITHOUT SCIATICA: ICD-10-CM

## 2025-08-04 DIAGNOSIS — M43.10 SPONDYLOLISTHESIS, ACQUIRED: ICD-10-CM

## 2025-08-04 DIAGNOSIS — M48.062 LUMBAR STENOSIS WITH NEUROGENIC CLAUDICATION: ICD-10-CM

## 2025-08-04 DIAGNOSIS — M54.50 CHRONIC BILATERAL LOW BACK PAIN WITHOUT SCIATICA: ICD-10-CM

## 2025-08-04 DIAGNOSIS — M25.572 ACUTE LEFT ANKLE PAIN: Primary | ICD-10-CM

## 2025-08-04 LAB
B-HCG UR QL: NEGATIVE
EXPIRATION DATE: NORMAL
GLUCOSE BLDC GLUCOMTR-MCNC: 83 MG/DL (ref 70–130)
INTERNAL NEGATIVE CONTROL: NORMAL
INTERNAL POSITIVE CONTROL: NORMAL
Lab: NORMAL

## 2025-08-04 PROCEDURE — 25010000002 CEFAZOLIN PER 500 MG: Performed by: NEUROLOGICAL SURGERY

## 2025-08-04 PROCEDURE — 25010000002 DEXAMETHASONE PER 1 MG

## 2025-08-04 PROCEDURE — 25010000002 VANCOMYCIN 1 G RECONSTITUTED SOLUTION: Performed by: NEUROLOGICAL SURGERY

## 2025-08-04 PROCEDURE — 25010000002 LIDOCAINE PF 1% 1 % SOLUTION: Performed by: ANESTHESIOLOGY

## 2025-08-04 PROCEDURE — 25010000002 FENTANYL CITRATE (PF) 50 MCG/ML SOLUTION

## 2025-08-04 PROCEDURE — 25810000003 LACTATED RINGERS PER 1000 ML: Performed by: NEUROLOGICAL SURGERY

## 2025-08-04 PROCEDURE — 22630 ARTHRD PST TQ 1NTRSPC LUM: CPT | Performed by: NEUROLOGICAL SURGERY

## 2025-08-04 PROCEDURE — C1713 ANCHOR/SCREW BN/BN,TIS/BN: HCPCS | Performed by: NEUROLOGICAL SURGERY

## 2025-08-04 PROCEDURE — 25010000002 FENTANYL CITRATE (PF) 50 MCG/ML SOLUTION: Performed by: ANESTHESIOLOGY

## 2025-08-04 PROCEDURE — 25010000002 FENTANYL CITRATE (PF) 100 MCG/2ML SOLUTION

## 2025-08-04 PROCEDURE — 22840 INSERT SPINE FIXATION DEVICE: CPT | Performed by: NEUROLOGICAL SURGERY

## 2025-08-04 PROCEDURE — 76000 FLUOROSCOPY <1 HR PHYS/QHP: CPT

## 2025-08-04 PROCEDURE — 25010000002 ONDANSETRON PER 1 MG

## 2025-08-04 PROCEDURE — 63052 LAM FACETC/FRMT ARTHRD LUM 1: CPT | Performed by: NEUROLOGICAL SURGERY

## 2025-08-04 PROCEDURE — 25010000002 LIDOCAINE PF 1% 1 % SOLUTION

## 2025-08-04 PROCEDURE — 61783 SCAN PROC SPINAL: CPT | Performed by: NEUROLOGICAL SURGERY

## 2025-08-04 PROCEDURE — 81025 URINE PREGNANCY TEST: CPT | Performed by: NEUROLOGICAL SURGERY

## 2025-08-04 PROCEDURE — 25810000003 LACTATED RINGERS PER 1000 ML: Performed by: ANESTHESIOLOGY

## 2025-08-04 PROCEDURE — 25010000002 SUGAMMADEX 200 MG/2ML SOLUTION

## 2025-08-04 PROCEDURE — 25010000002 HYDROMORPHONE PER 4 MG: Performed by: ANESTHESIOLOGY

## 2025-08-04 PROCEDURE — 25010000002 HYDROMORPHONE 1 MG/ML SOLUTION

## 2025-08-04 PROCEDURE — 22853 INSJ BIOMECHANICAL DEVICE: CPT | Performed by: NEUROLOGICAL SURGERY

## 2025-08-04 PROCEDURE — 82948 REAGENT STRIP/BLOOD GLUCOSE: CPT

## 2025-08-04 PROCEDURE — 25010000002 PROPOFOL 10 MG/ML EMULSION

## 2025-08-04 PROCEDURE — 25810000003 SODIUM CHLORIDE PER 500 ML: Performed by: NEUROLOGICAL SURGERY

## 2025-08-04 DEVICE — SET SCREW 5540030 5.5 TI NS BRK OFF
Type: IMPLANTABLE DEVICE | Site: BACK | Status: FUNCTIONAL
Brand: CD HORIZON® SPINAL SYSTEM

## 2025-08-04 DEVICE — MAS 55840026545T 5.5/6.0 FENSTRTD 6.5X45
Type: IMPLANTABLE DEVICE | Site: BACK | Status: FUNCTIONAL
Brand: CD HORIZON® FENESTRATED SCREW SET

## 2025-08-04 DEVICE — ROD 1555501030 5.5 CCM NS CURV 30MM
Type: IMPLANTABLE DEVICE | Site: BACK | Status: FUNCTIONAL
Brand: CD HORIZON® SPINAL SYSTEM

## 2025-08-04 DEVICE — IMPLANTABLE DEVICE
Type: IMPLANTABLE DEVICE | Site: BACK | Status: FUNCTIONAL
Brand: SURGIFOAM® ABSORBABLE GELATIN SPONGE, U.S.P.

## 2025-08-04 DEVICE — MAGNETOS FLEX MATRIX  2.35CC 0.25-1MM SMALL
Type: IMPLANTABLE DEVICE | Site: BACK | Status: FUNCTIONAL
Brand: MAGNETOS

## 2025-08-04 DEVICE — ROD 1555501035 5.5 CCM NS CURV 35MM
Type: IMPLANTABLE DEVICE | Site: BACK | Status: FUNCTIONAL
Brand: CD HORIZON® SPINAL SYSTEM

## 2025-08-04 DEVICE — SPACR TLIF/DLIF ADAPTIX 24X12MM: Type: IMPLANTABLE DEVICE | Site: BACK | Status: FUNCTIONAL

## 2025-08-04 DEVICE — SSC BONE WAX
Type: IMPLANTABLE DEVICE | Site: BACK | Status: FUNCTIONAL
Brand: SSC BONE WAX

## 2025-08-04 DEVICE — FLOSEAL WITH RECOTHROM - 10ML.
Type: IMPLANTABLE DEVICE | Site: BACK | Status: FUNCTIONAL
Brand: FLOSEAL HEMOSTATIC MATRIX

## 2025-08-04 RX ORDER — BISACODYL 5 MG/1
5 TABLET, DELAYED RELEASE ORAL DAILY PRN
Status: DISCONTINUED | OUTPATIENT
Start: 2025-08-04 | End: 2025-08-07 | Stop reason: HOSPADM

## 2025-08-04 RX ORDER — ACETAMINOPHEN 160 MG/5ML
650 SOLUTION ORAL EVERY 4 HOURS PRN
Status: DISCONTINUED | OUTPATIENT
Start: 2025-08-04 | End: 2025-08-07 | Stop reason: HOSPADM

## 2025-08-04 RX ORDER — POLYETHYLENE GLYCOL 3350 17 G/17G
17 POWDER, FOR SOLUTION ORAL DAILY PRN
Status: DISCONTINUED | OUTPATIENT
Start: 2025-08-04 | End: 2025-08-07 | Stop reason: HOSPADM

## 2025-08-04 RX ORDER — PROGESTERONE 100 MG/1
200 CAPSULE ORAL DAILY
Status: DISCONTINUED | OUTPATIENT
Start: 2025-08-04 | End: 2025-08-07 | Stop reason: HOSPADM

## 2025-08-04 RX ORDER — FENTANYL CITRATE 50 UG/ML
50 INJECTION, SOLUTION INTRAMUSCULAR; INTRAVENOUS
Status: DISCONTINUED | OUTPATIENT
Start: 2025-08-04 | End: 2025-08-04 | Stop reason: HOSPADM

## 2025-08-04 RX ORDER — GABAPENTIN 300 MG/1
300 CAPSULE ORAL EVERY 12 HOURS SCHEDULED
Status: DISCONTINUED | OUTPATIENT
Start: 2025-08-04 | End: 2025-08-07 | Stop reason: HOSPADM

## 2025-08-04 RX ORDER — BISACODYL 10 MG
10 SUPPOSITORY, RECTAL RECTAL DAILY PRN
Status: DISCONTINUED | OUTPATIENT
Start: 2025-08-04 | End: 2025-08-07 | Stop reason: HOSPADM

## 2025-08-04 RX ORDER — ONDANSETRON 2 MG/ML
4 INJECTION INTRAMUSCULAR; INTRAVENOUS ONCE AS NEEDED
Status: COMPLETED | OUTPATIENT
Start: 2025-08-04 | End: 2025-08-04

## 2025-08-04 RX ORDER — ONDANSETRON 4 MG/1
4 TABLET, ORALLY DISINTEGRATING ORAL EVERY 6 HOURS PRN
Status: DISCONTINUED | OUTPATIENT
Start: 2025-08-04 | End: 2025-08-07 | Stop reason: HOSPADM

## 2025-08-04 RX ORDER — MORPHINE SULFATE 4 MG/ML
4 INJECTION, SOLUTION INTRAMUSCULAR; INTRAVENOUS EVERY 4 HOURS PRN
Status: DISCONTINUED | OUTPATIENT
Start: 2025-08-04 | End: 2025-08-07 | Stop reason: HOSPADM

## 2025-08-04 RX ORDER — PANTOPRAZOLE SODIUM 40 MG/1
40 TABLET, DELAYED RELEASE ORAL DAILY
Status: DISCONTINUED | OUTPATIENT
Start: 2025-08-05 | End: 2025-08-07 | Stop reason: HOSPADM

## 2025-08-04 RX ORDER — LIDOCAINE HYDROCHLORIDE 10 MG/ML
0.5 INJECTION, SOLUTION EPIDURAL; INFILTRATION; INTRACAUDAL; PERINEURAL ONCE AS NEEDED
Status: COMPLETED | OUTPATIENT
Start: 2025-08-04 | End: 2025-08-04

## 2025-08-04 RX ORDER — FENTANYL CITRATE 50 UG/ML
INJECTION, SOLUTION INTRAMUSCULAR; INTRAVENOUS
Status: COMPLETED
Start: 2025-08-04 | End: 2025-08-04

## 2025-08-04 RX ORDER — ONDANSETRON 2 MG/ML
INJECTION INTRAMUSCULAR; INTRAVENOUS
Status: COMPLETED
Start: 2025-08-04 | End: 2025-08-04

## 2025-08-04 RX ORDER — SODIUM CHLORIDE 0.9 % (FLUSH) 0.9 %
10 SYRINGE (ML) INJECTION EVERY 12 HOURS SCHEDULED
Status: DISCONTINUED | OUTPATIENT
Start: 2025-08-04 | End: 2025-08-07 | Stop reason: HOSPADM

## 2025-08-04 RX ORDER — FENTANYL CITRATE 50 UG/ML
INJECTION, SOLUTION INTRAMUSCULAR; INTRAVENOUS AS NEEDED
Status: DISCONTINUED | OUTPATIENT
Start: 2025-08-04 | End: 2025-08-04 | Stop reason: SURG

## 2025-08-04 RX ORDER — PROMETHAZINE HYDROCHLORIDE 25 MG/1
25 SUPPOSITORY RECTAL ONCE AS NEEDED
Status: DISCONTINUED | OUTPATIENT
Start: 2025-08-04 | End: 2025-08-04 | Stop reason: HOSPADM

## 2025-08-04 RX ORDER — HYDROMORPHONE HYDROCHLORIDE 1 MG/ML
0.5 INJECTION, SOLUTION INTRAMUSCULAR; INTRAVENOUS; SUBCUTANEOUS
Status: DISCONTINUED | OUTPATIENT
Start: 2025-08-04 | End: 2025-08-04 | Stop reason: HOSPADM

## 2025-08-04 RX ORDER — SODIUM CHLORIDE, SODIUM LACTATE, POTASSIUM CHLORIDE, CALCIUM CHLORIDE 600; 310; 30; 20 MG/100ML; MG/100ML; MG/100ML; MG/100ML
9 INJECTION, SOLUTION INTRAVENOUS CONTINUOUS
Status: ACTIVE | OUTPATIENT
Start: 2025-08-04 | End: 2025-08-05

## 2025-08-04 RX ORDER — SODIUM CHLORIDE 0.9 % (FLUSH) 0.9 %
3-10 SYRINGE (ML) INJECTION AS NEEDED
Status: DISCONTINUED | OUTPATIENT
Start: 2025-08-04 | End: 2025-08-04 | Stop reason: HOSPADM

## 2025-08-04 RX ORDER — MAGNESIUM HYDROXIDE 1200 MG/15ML
LIQUID ORAL AS NEEDED
Status: DISCONTINUED | OUTPATIENT
Start: 2025-08-04 | End: 2025-08-04 | Stop reason: HOSPADM

## 2025-08-04 RX ORDER — IPRATROPIUM BROMIDE AND ALBUTEROL SULFATE 2.5; .5 MG/3ML; MG/3ML
3 SOLUTION RESPIRATORY (INHALATION) ONCE AS NEEDED
Status: DISCONTINUED | OUTPATIENT
Start: 2025-08-04 | End: 2025-08-04 | Stop reason: HOSPADM

## 2025-08-04 RX ORDER — ONDANSETRON 2 MG/ML
4 INJECTION INTRAMUSCULAR; INTRAVENOUS EVERY 6 HOURS PRN
Status: DISCONTINUED | OUTPATIENT
Start: 2025-08-04 | End: 2025-08-07 | Stop reason: HOSPADM

## 2025-08-04 RX ORDER — ROCURONIUM BROMIDE 10 MG/ML
INJECTION, SOLUTION INTRAVENOUS AS NEEDED
Status: DISCONTINUED | OUTPATIENT
Start: 2025-08-04 | End: 2025-08-04 | Stop reason: SURG

## 2025-08-04 RX ORDER — ACETAMINOPHEN 325 MG/1
650 TABLET ORAL EVERY 4 HOURS PRN
Status: DISCONTINUED | OUTPATIENT
Start: 2025-08-04 | End: 2025-08-07 | Stop reason: HOSPADM

## 2025-08-04 RX ORDER — DEXAMETHASONE SODIUM PHOSPHATE 4 MG/ML
INJECTION, SOLUTION INTRA-ARTICULAR; INTRALESIONAL; INTRAMUSCULAR; INTRAVENOUS; SOFT TISSUE AS NEEDED
Status: DISCONTINUED | OUTPATIENT
Start: 2025-08-04 | End: 2025-08-04 | Stop reason: SURG

## 2025-08-04 RX ORDER — SCOPOLAMINE 1 MG/3D
1 PATCH, EXTENDED RELEASE TRANSDERMAL ONCE
Status: COMPLETED | OUTPATIENT
Start: 2025-08-04 | End: 2025-08-07

## 2025-08-04 RX ORDER — MIDAZOLAM HYDROCHLORIDE 1 MG/ML
1 INJECTION, SOLUTION INTRAMUSCULAR; INTRAVENOUS
Status: DISCONTINUED | OUTPATIENT
Start: 2025-08-04 | End: 2025-08-04 | Stop reason: HOSPADM

## 2025-08-04 RX ORDER — SODIUM CHLORIDE 0.9 % (FLUSH) 0.9 %
10 SYRINGE (ML) INJECTION AS NEEDED
Status: DISCONTINUED | OUTPATIENT
Start: 2025-08-04 | End: 2025-08-04 | Stop reason: HOSPADM

## 2025-08-04 RX ORDER — VANCOMYCIN HYDROCHLORIDE 1 G/20ML
INJECTION, POWDER, LYOPHILIZED, FOR SOLUTION INTRAVENOUS AS NEEDED
Status: DISCONTINUED | OUTPATIENT
Start: 2025-08-04 | End: 2025-08-04 | Stop reason: HOSPADM

## 2025-08-04 RX ORDER — HYDROCODONE BITARTRATE AND ACETAMINOPHEN 7.5; 325 MG/1; MG/1
TABLET ORAL
Status: COMPLETED
Start: 2025-08-04 | End: 2025-08-04

## 2025-08-04 RX ORDER — HYDRALAZINE HYDROCHLORIDE 20 MG/ML
5 INJECTION INTRAMUSCULAR; INTRAVENOUS
Status: DISCONTINUED | OUTPATIENT
Start: 2025-08-04 | End: 2025-08-04 | Stop reason: HOSPADM

## 2025-08-04 RX ORDER — EPHEDRINE SULFATE 50 MG/ML
INJECTION INTRAVENOUS AS NEEDED
Status: DISCONTINUED | OUTPATIENT
Start: 2025-08-04 | End: 2025-08-04 | Stop reason: SURG

## 2025-08-04 RX ORDER — SODIUM CHLORIDE 9 MG/ML
INJECTION, SOLUTION INTRAVENOUS AS NEEDED
Status: DISCONTINUED | OUTPATIENT
Start: 2025-08-04 | End: 2025-08-04 | Stop reason: HOSPADM

## 2025-08-04 RX ORDER — NALOXONE HCL 0.4 MG/ML
0.4 VIAL (ML) INJECTION AS NEEDED
Status: DISCONTINUED | OUTPATIENT
Start: 2025-08-04 | End: 2025-08-04 | Stop reason: HOSPADM

## 2025-08-04 RX ORDER — LISINOPRIL 5 MG/1
5 TABLET ORAL DAILY
Status: DISCONTINUED | OUTPATIENT
Start: 2025-08-04 | End: 2025-08-07 | Stop reason: HOSPADM

## 2025-08-04 RX ORDER — DEXMEDETOMIDINE HYDROCHLORIDE 4 UG/ML
INJECTION, SOLUTION INTRAVENOUS AS NEEDED
Status: DISCONTINUED | OUTPATIENT
Start: 2025-08-04 | End: 2025-08-04 | Stop reason: SURG

## 2025-08-04 RX ORDER — SODIUM CHLORIDE, SODIUM LACTATE, POTASSIUM CHLORIDE, CALCIUM CHLORIDE 600; 310; 30; 20 MG/100ML; MG/100ML; MG/100ML; MG/100ML
90 INJECTION, SOLUTION INTRAVENOUS CONTINUOUS
Status: ACTIVE | OUTPATIENT
Start: 2025-08-04 | End: 2025-08-05

## 2025-08-04 RX ORDER — LABETALOL HYDROCHLORIDE 5 MG/ML
5 INJECTION, SOLUTION INTRAVENOUS
Status: DISCONTINUED | OUTPATIENT
Start: 2025-08-04 | End: 2025-08-04 | Stop reason: HOSPADM

## 2025-08-04 RX ORDER — SODIUM CHLORIDE 9 MG/ML
9 INJECTION, SOLUTION INTRAVENOUS AS NEEDED
Status: DISCONTINUED | OUTPATIENT
Start: 2025-08-04 | End: 2025-08-04 | Stop reason: HOSPADM

## 2025-08-04 RX ORDER — LIDOCAINE HYDROCHLORIDE 10 MG/ML
INJECTION, SOLUTION EPIDURAL; INFILTRATION; INTRACAUDAL; PERINEURAL AS NEEDED
Status: DISCONTINUED | OUTPATIENT
Start: 2025-08-04 | End: 2025-08-04 | Stop reason: SURG

## 2025-08-04 RX ORDER — SODIUM CHLORIDE 0.9 % (FLUSH) 0.9 %
10 SYRINGE (ML) INJECTION AS NEEDED
Status: DISCONTINUED | OUTPATIENT
Start: 2025-08-04 | End: 2025-08-07 | Stop reason: HOSPADM

## 2025-08-04 RX ORDER — NALOXONE HCL 0.4 MG/ML
0.4 VIAL (ML) INJECTION
Status: DISCONTINUED | OUTPATIENT
Start: 2025-08-04 | End: 2025-08-07 | Stop reason: HOSPADM

## 2025-08-04 RX ORDER — HYDROCODONE BITARTRATE AND ACETAMINOPHEN 5; 325 MG/1; MG/1
1 TABLET ORAL ONCE AS NEEDED
Status: DISCONTINUED | OUTPATIENT
Start: 2025-08-04 | End: 2025-08-04 | Stop reason: HOSPADM

## 2025-08-04 RX ORDER — BUPIVACAINE HCL/0.9 % NACL/PF 0.125 %
PLASTIC BAG, INJECTION (ML) EPIDURAL AS NEEDED
Status: DISCONTINUED | OUTPATIENT
Start: 2025-08-04 | End: 2025-08-04 | Stop reason: SURG

## 2025-08-04 RX ORDER — BUPIVACAINE HYDROCHLORIDE AND EPINEPHRINE 2.5; 5 MG/ML; UG/ML
INJECTION, SOLUTION EPIDURAL; INFILTRATION; INTRACAUDAL; PERINEURAL AS NEEDED
Status: DISCONTINUED | OUTPATIENT
Start: 2025-08-04 | End: 2025-08-04 | Stop reason: HOSPADM

## 2025-08-04 RX ORDER — AMOXICILLIN 250 MG
2 CAPSULE ORAL 2 TIMES DAILY
Status: DISCONTINUED | OUTPATIENT
Start: 2025-08-04 | End: 2025-08-07 | Stop reason: HOSPADM

## 2025-08-04 RX ORDER — FLUTICASONE PROPIONATE 50 MCG
1 SPRAY, SUSPENSION (ML) NASAL 2 TIMES DAILY
Status: DISCONTINUED | OUTPATIENT
Start: 2025-08-04 | End: 2025-08-07 | Stop reason: HOSPADM

## 2025-08-04 RX ORDER — OXYCODONE AND ACETAMINOPHEN 7.5; 325 MG/1; MG/1
1 TABLET ORAL EVERY 4 HOURS PRN
Refills: 0 | Status: DISCONTINUED | OUTPATIENT
Start: 2025-08-04 | End: 2025-08-07 | Stop reason: HOSPADM

## 2025-08-04 RX ORDER — PROMETHAZINE HYDROCHLORIDE 25 MG/1
25 TABLET ORAL ONCE AS NEEDED
Status: DISCONTINUED | OUTPATIENT
Start: 2025-08-04 | End: 2025-08-04 | Stop reason: HOSPADM

## 2025-08-04 RX ORDER — DIPHENHYDRAMINE HCL 25 MG
25 CAPSULE ORAL NIGHTLY PRN
Status: DISCONTINUED | OUTPATIENT
Start: 2025-08-04 | End: 2025-08-07 | Stop reason: HOSPADM

## 2025-08-04 RX ORDER — PROMETHAZINE HYDROCHLORIDE 12.5 MG/1
12.5 SUPPOSITORY RECTAL EVERY 6 HOURS PRN
Status: DISCONTINUED | OUTPATIENT
Start: 2025-08-04 | End: 2025-08-07 | Stop reason: HOSPADM

## 2025-08-04 RX ORDER — FAMOTIDINE 10 MG/ML
20 INJECTION, SOLUTION INTRAVENOUS ONCE
Status: CANCELLED | OUTPATIENT
Start: 2025-08-04 | End: 2025-08-04

## 2025-08-04 RX ORDER — SODIUM CHLORIDE 0.9 % (FLUSH) 0.9 %
3 SYRINGE (ML) INJECTION EVERY 12 HOURS SCHEDULED
Status: DISCONTINUED | OUTPATIENT
Start: 2025-08-04 | End: 2025-08-04 | Stop reason: HOSPADM

## 2025-08-04 RX ORDER — DROPERIDOL 2.5 MG/ML
0.62 INJECTION, SOLUTION INTRAMUSCULAR; INTRAVENOUS
Status: DISCONTINUED | OUTPATIENT
Start: 2025-08-04 | End: 2025-08-04 | Stop reason: HOSPADM

## 2025-08-04 RX ORDER — HYDROCODONE BITARTRATE AND ACETAMINOPHEN 5; 325 MG/1; MG/1
1 TABLET ORAL EVERY 4 HOURS PRN
Refills: 0 | Status: DISCONTINUED | OUTPATIENT
Start: 2025-08-04 | End: 2025-08-07 | Stop reason: HOSPADM

## 2025-08-04 RX ORDER — SODIUM CHLORIDE 9 MG/ML
40 INJECTION, SOLUTION INTRAVENOUS AS NEEDED
Status: DISCONTINUED | OUTPATIENT
Start: 2025-08-04 | End: 2025-08-07 | Stop reason: HOSPADM

## 2025-08-04 RX ORDER — SODIUM CHLORIDE 0.9 % (FLUSH) 0.9 %
10 SYRINGE (ML) INJECTION EVERY 12 HOURS SCHEDULED
Status: CANCELLED | OUTPATIENT
Start: 2025-08-04

## 2025-08-04 RX ORDER — HYDROCODONE BITARTRATE AND ACETAMINOPHEN 7.5; 325 MG/1; MG/1
1 TABLET ORAL EVERY 4 HOURS PRN
Status: DISCONTINUED | OUTPATIENT
Start: 2025-08-04 | End: 2025-08-04 | Stop reason: HOSPADM

## 2025-08-04 RX ORDER — PROMETHAZINE HYDROCHLORIDE 12.5 MG/1
12.5 TABLET ORAL EVERY 6 HOURS PRN
Status: DISCONTINUED | OUTPATIENT
Start: 2025-08-04 | End: 2025-08-07 | Stop reason: HOSPADM

## 2025-08-04 RX ORDER — FAMOTIDINE 20 MG/1
20 TABLET, FILM COATED ORAL ONCE
Status: COMPLETED | OUTPATIENT
Start: 2025-08-04 | End: 2025-08-04

## 2025-08-04 RX ORDER — PROPOFOL 10 MG/ML
VIAL (ML) INTRAVENOUS AS NEEDED
Status: DISCONTINUED | OUTPATIENT
Start: 2025-08-04 | End: 2025-08-04 | Stop reason: SURG

## 2025-08-04 RX ORDER — ESCITALOPRAM OXALATE 20 MG/1
20 TABLET ORAL EVERY 24 HOURS
Status: DISCONTINUED | OUTPATIENT
Start: 2025-08-05 | End: 2025-08-07 | Stop reason: HOSPADM

## 2025-08-04 RX ORDER — DROPERIDOL 2.5 MG/ML
0.62 INJECTION, SOLUTION INTRAMUSCULAR; INTRAVENOUS ONCE AS NEEDED
Status: DISCONTINUED | OUTPATIENT
Start: 2025-08-04 | End: 2025-08-04 | Stop reason: HOSPADM

## 2025-08-04 RX ADMIN — HYDROMORPHONE HYDROCHLORIDE 0.5 MG: 1 INJECTION, SOLUTION INTRAMUSCULAR; INTRAVENOUS; SUBCUTANEOUS at 16:18

## 2025-08-04 RX ADMIN — OXYCODONE HYDROCHLORIDE AND ACETAMINOPHEN 1 TABLET: 7.5; 325 TABLET ORAL at 23:25

## 2025-08-04 RX ADMIN — DEXMEDETOMIDINE HYDROCHLORIDE IN 0.9% SODIUM CHLORIDE 24 MCG: 4 INJECTION INTRAVENOUS at 15:14

## 2025-08-04 RX ADMIN — FENTANYL CITRATE 50 MCG: 50 INJECTION, SOLUTION INTRAMUSCULAR; INTRAVENOUS at 15:44

## 2025-08-04 RX ADMIN — LIDOCAINE HYDROCHLORIDE 50 MG: 10 INJECTION, SOLUTION EPIDURAL; INFILTRATION; INTRACAUDAL; PERINEURAL at 12:24

## 2025-08-04 RX ADMIN — HYDROMORPHONE HYDROCHLORIDE 0.5 MG: 1 INJECTION, SOLUTION INTRAMUSCULAR; INTRAVENOUS; SUBCUTANEOUS at 15:58

## 2025-08-04 RX ADMIN — SENNOSIDES, DOCUSATE SODIUM 2 TABLET: 50; 8.6 TABLET, FILM COATED ORAL at 20:03

## 2025-08-04 RX ADMIN — DEXAMETHASONE SODIUM PHOSPHATE 8 MG: 4 INJECTION, SOLUTION INTRAMUSCULAR; INTRAVENOUS at 12:42

## 2025-08-04 RX ADMIN — HYDROMORPHONE HYDROCHLORIDE 0.5 MG: 1 INJECTION, SOLUTION INTRAMUSCULAR; INTRAVENOUS; SUBCUTANEOUS at 15:40

## 2025-08-04 RX ADMIN — Medication 200 MCG: at 12:49

## 2025-08-04 RX ADMIN — SODIUM CHLORIDE 2000 MG: 900 INJECTION INTRAVENOUS at 12:24

## 2025-08-04 RX ADMIN — FENTANYL CITRATE 50 MCG: 50 INJECTION, SOLUTION INTRAMUSCULAR; INTRAVENOUS at 15:52

## 2025-08-04 RX ADMIN — FENTANYL CITRATE 50 MCG: 50 INJECTION, SOLUTION INTRAMUSCULAR; INTRAVENOUS at 15:37

## 2025-08-04 RX ADMIN — HYDROMORPHONE HYDROCHLORIDE 0.5 MG: 1 INJECTION, SOLUTION INTRAMUSCULAR; INTRAVENOUS; SUBCUTANEOUS at 16:08

## 2025-08-04 RX ADMIN — OXYCODONE HYDROCHLORIDE AND ACETAMINOPHEN 1 TABLET: 7.5; 325 TABLET ORAL at 17:59

## 2025-08-04 RX ADMIN — ONDANSETRON 4 MG: 2 INJECTION INTRAMUSCULAR; INTRAVENOUS at 16:14

## 2025-08-04 RX ADMIN — GABAPENTIN 300 MG: 300 CAPSULE ORAL at 20:03

## 2025-08-04 RX ADMIN — FENTANYL CITRATE 50 MCG: 50 INJECTION, SOLUTION INTRAMUSCULAR; INTRAVENOUS at 16:02

## 2025-08-04 RX ADMIN — FAMOTIDINE 20 MG: 20 TABLET, FILM COATED ORAL at 10:44

## 2025-08-04 RX ADMIN — SUGAMMADEX 200 MG: 100 INJECTION, SOLUTION INTRAVENOUS at 15:10

## 2025-08-04 RX ADMIN — ROCURONIUM BROMIDE 60 MG: 10 INJECTION INTRAVENOUS at 12:24

## 2025-08-04 RX ADMIN — LIDOCAINE HYDROCHLORIDE 0.5 ML: 10 INJECTION, SOLUTION EPIDURAL; INFILTRATION; INTRACAUDAL; PERINEURAL at 10:44

## 2025-08-04 RX ADMIN — FLUTICASONE PROPIONATE 1 SPRAY: 50 SPRAY, METERED NASAL at 20:03

## 2025-08-04 RX ADMIN — HYDROCODONE BITARTRATE AND ACETAMINOPHEN 1 TABLET: 7.5; 325 TABLET ORAL at 16:13

## 2025-08-04 RX ADMIN — FENTANYL CITRATE 100 MCG: 0.05 INJECTION, SOLUTION INTRAMUSCULAR; INTRAVENOUS at 12:24

## 2025-08-04 RX ADMIN — EPHEDRINE SULFATE 10 MG: 50 INJECTION INTRAVENOUS at 13:06

## 2025-08-04 RX ADMIN — PROPOFOL 200 MG: 10 INJECTION, EMULSION INTRAVENOUS at 12:24

## 2025-08-04 RX ADMIN — SODIUM CHLORIDE, POTASSIUM CHLORIDE, SODIUM LACTATE AND CALCIUM CHLORIDE 9 ML/HR: 600; 310; 30; 20 INJECTION, SOLUTION INTRAVENOUS at 10:43

## 2025-08-04 RX ADMIN — HYDROMORPHONE HYDROCHLORIDE 0.5 MG: 1 INJECTION, SOLUTION INTRAMUSCULAR; INTRAVENOUS; SUBCUTANEOUS at 16:28

## 2025-08-04 RX ADMIN — Medication 100 MCG: at 13:10

## 2025-08-04 RX ADMIN — SCOPOLAMINE 1 PATCH: 1.5 PATCH, EXTENDED RELEASE TRANSDERMAL at 11:20

## 2025-08-04 RX ADMIN — SODIUM CHLORIDE, POTASSIUM CHLORIDE, SODIUM LACTATE AND CALCIUM CHLORIDE 90 ML/HR: 600; 310; 30; 20 INJECTION, SOLUTION INTRAVENOUS at 17:10

## 2025-08-04 RX ADMIN — HYDROMORPHONE HYDROCHLORIDE 0.5 MG: 1 INJECTION, SOLUTION INTRAMUSCULAR; INTRAVENOUS; SUBCUTANEOUS at 16:38

## 2025-08-04 RX ADMIN — CEFAZOLIN 2000 MG: 2 INJECTION, POWDER, FOR SOLUTION INTRAMUSCULAR; INTRAVENOUS at 20:02

## 2025-08-05 LAB
HCT VFR BLD AUTO: 39.9 % (ref 34–46.6)
HGB BLD-MCNC: 12.9 G/DL (ref 12–15.9)

## 2025-08-05 PROCEDURE — 85018 HEMOGLOBIN: CPT | Performed by: NEUROLOGICAL SURGERY

## 2025-08-05 PROCEDURE — 25010000002 CEFAZOLIN PER 500 MG: Performed by: NEUROLOGICAL SURGERY

## 2025-08-05 PROCEDURE — 97165 OT EVAL LOW COMPLEX 30 MIN: CPT | Performed by: OCCUPATIONAL THERAPIST

## 2025-08-05 PROCEDURE — 99024 POSTOP FOLLOW-UP VISIT: CPT | Performed by: NEUROLOGICAL SURGERY

## 2025-08-05 PROCEDURE — 97162 PT EVAL MOD COMPLEX 30 MIN: CPT

## 2025-08-05 PROCEDURE — 97535 SELF CARE MNGMENT TRAINING: CPT | Performed by: OCCUPATIONAL THERAPIST

## 2025-08-05 PROCEDURE — 85014 HEMATOCRIT: CPT | Performed by: NEUROLOGICAL SURGERY

## 2025-08-05 RX ADMIN — PANTOPRAZOLE SODIUM 40 MG: 40 TABLET, DELAYED RELEASE ORAL at 08:04

## 2025-08-05 RX ADMIN — ESCITALOPRAM OXALATE 20 MG: 20 TABLET ORAL at 08:04

## 2025-08-05 RX ADMIN — FLUTICASONE PROPIONATE 1 SPRAY: 50 SPRAY, METERED NASAL at 08:07

## 2025-08-05 RX ADMIN — SENNOSIDES, DOCUSATE SODIUM 2 TABLET: 50; 8.6 TABLET, FILM COATED ORAL at 08:04

## 2025-08-05 RX ADMIN — OXYCODONE HYDROCHLORIDE AND ACETAMINOPHEN 1 TABLET: 7.5; 325 TABLET ORAL at 11:26

## 2025-08-05 RX ADMIN — GABAPENTIN 300 MG: 300 CAPSULE ORAL at 19:37

## 2025-08-05 RX ADMIN — Medication 10 ML: at 19:38

## 2025-08-05 RX ADMIN — HYDROCODONE BITARTRATE AND ACETAMINOPHEN 1 TABLET: 5; 325 TABLET ORAL at 15:32

## 2025-08-05 RX ADMIN — CEFAZOLIN 2000 MG: 2 INJECTION, POWDER, FOR SOLUTION INTRAMUSCULAR; INTRAVENOUS at 04:43

## 2025-08-05 RX ADMIN — FLUTICASONE PROPIONATE 1 SPRAY: 50 SPRAY, METERED NASAL at 19:38

## 2025-08-05 RX ADMIN — LISINOPRIL 5 MG: 5 TABLET ORAL at 08:04

## 2025-08-05 RX ADMIN — OXYCODONE HYDROCHLORIDE AND ACETAMINOPHEN 1 TABLET: 7.5; 325 TABLET ORAL at 19:37

## 2025-08-05 RX ADMIN — OXYCODONE HYDROCHLORIDE AND ACETAMINOPHEN 1 TABLET: 7.5; 325 TABLET ORAL at 04:43

## 2025-08-05 RX ADMIN — PROGESTERONE 200 MG: 100 CAPSULE ORAL at 08:04

## 2025-08-05 RX ADMIN — SENNOSIDES, DOCUSATE SODIUM 2 TABLET: 50; 8.6 TABLET, FILM COATED ORAL at 19:37

## 2025-08-05 RX ADMIN — HYDROCODONE BITARTRATE AND ACETAMINOPHEN 1 TABLET: 5; 325 TABLET ORAL at 08:04

## 2025-08-05 RX ADMIN — GABAPENTIN 300 MG: 300 CAPSULE ORAL at 08:05

## 2025-08-06 PROCEDURE — 99024 POSTOP FOLLOW-UP VISIT: CPT | Performed by: NEUROLOGICAL SURGERY

## 2025-08-06 PROCEDURE — 25010000002 MORPHINE PER 10 MG: Performed by: NEUROLOGICAL SURGERY

## 2025-08-06 PROCEDURE — 97116 GAIT TRAINING THERAPY: CPT

## 2025-08-06 PROCEDURE — 97110 THERAPEUTIC EXERCISES: CPT

## 2025-08-06 RX ADMIN — SENNOSIDES, DOCUSATE SODIUM 2 TABLET: 50; 8.6 TABLET, FILM COATED ORAL at 09:31

## 2025-08-06 RX ADMIN — FLUTICASONE PROPIONATE 1 SPRAY: 50 SPRAY, METERED NASAL at 09:31

## 2025-08-06 RX ADMIN — POLYETHYLENE GLYCOL 3350 17 G: 17 POWDER, FOR SOLUTION ORAL at 17:51

## 2025-08-06 RX ADMIN — LISINOPRIL 5 MG: 5 TABLET ORAL at 09:31

## 2025-08-06 RX ADMIN — OXYCODONE HYDROCHLORIDE AND ACETAMINOPHEN 1 TABLET: 7.5; 325 TABLET ORAL at 05:18

## 2025-08-06 RX ADMIN — OXYCODONE HYDROCHLORIDE AND ACETAMINOPHEN 1 TABLET: 7.5; 325 TABLET ORAL at 09:29

## 2025-08-06 RX ADMIN — ACETAMINOPHEN 650 MG: 325 TABLET ORAL at 22:32

## 2025-08-06 RX ADMIN — ESCITALOPRAM OXALATE 20 MG: 20 TABLET ORAL at 09:32

## 2025-08-06 RX ADMIN — PROGESTERONE 200 MG: 100 CAPSULE ORAL at 09:32

## 2025-08-06 RX ADMIN — Medication 10 ML: at 09:34

## 2025-08-06 RX ADMIN — GABAPENTIN 300 MG: 300 CAPSULE ORAL at 09:32

## 2025-08-06 RX ADMIN — OXYCODONE HYDROCHLORIDE AND ACETAMINOPHEN 1 TABLET: 7.5; 325 TABLET ORAL at 22:32

## 2025-08-06 RX ADMIN — GABAPENTIN 300 MG: 300 CAPSULE ORAL at 20:08

## 2025-08-06 RX ADMIN — OXYCODONE HYDROCHLORIDE AND ACETAMINOPHEN 1 TABLET: 7.5; 325 TABLET ORAL at 01:24

## 2025-08-06 RX ADMIN — MORPHINE SULFATE 4 MG: 4 INJECTION, SOLUTION INTRAMUSCULAR; INTRAVENOUS at 06:00

## 2025-08-06 RX ADMIN — PANTOPRAZOLE SODIUM 40 MG: 40 TABLET, DELAYED RELEASE ORAL at 09:31

## 2025-08-06 RX ADMIN — FLUTICASONE PROPIONATE 1 SPRAY: 50 SPRAY, METERED NASAL at 20:08

## 2025-08-06 RX ADMIN — SENNOSIDES, DOCUSATE SODIUM 2 TABLET: 50; 8.6 TABLET, FILM COATED ORAL at 20:08

## 2025-08-06 RX ADMIN — HYDROCODONE BITARTRATE AND ACETAMINOPHEN 1 TABLET: 5; 325 TABLET ORAL at 17:51

## 2025-08-06 RX ADMIN — Medication 10 ML: at 20:08

## 2025-08-06 RX ADMIN — OXYCODONE HYDROCHLORIDE AND ACETAMINOPHEN 1 TABLET: 7.5; 325 TABLET ORAL at 13:40

## 2025-08-07 ENCOUNTER — TELEPHONE (OUTPATIENT)
Dept: NEUROSURGERY | Facility: CLINIC | Age: 55
End: 2025-08-07
Payer: COMMERCIAL

## 2025-08-07 VITALS
DIASTOLIC BLOOD PRESSURE: 83 MMHG | TEMPERATURE: 98.7 F | RESPIRATION RATE: 18 BRPM | BODY MASS INDEX: 40.83 KG/M2 | SYSTOLIC BLOOD PRESSURE: 132 MMHG | OXYGEN SATURATION: 95 % | WEIGHT: 260.14 LBS | HEIGHT: 67 IN | HEART RATE: 105 BPM

## 2025-08-07 DIAGNOSIS — Z98.1 S/P LUMBAR FUSION: Primary | ICD-10-CM

## 2025-08-07 PROCEDURE — 97116 GAIT TRAINING THERAPY: CPT

## 2025-08-07 PROCEDURE — 97530 THERAPEUTIC ACTIVITIES: CPT

## 2025-08-07 PROCEDURE — 99024 POSTOP FOLLOW-UP VISIT: CPT | Performed by: NEUROLOGICAL SURGERY

## 2025-08-07 RX ORDER — HYDROCODONE BITARTRATE AND ACETAMINOPHEN 7.5; 325 MG/1; MG/1
1 TABLET ORAL 3 TIMES DAILY PRN
Qty: 12 TABLET | Refills: 0 | Status: SHIPPED | OUTPATIENT
Start: 2025-08-07

## 2025-08-07 RX ADMIN — PROGESTERONE 200 MG: 100 CAPSULE ORAL at 08:11

## 2025-08-07 RX ADMIN — OXYCODONE HYDROCHLORIDE AND ACETAMINOPHEN 1 TABLET: 7.5; 325 TABLET ORAL at 06:23

## 2025-08-07 RX ADMIN — HYDROCODONE BITARTRATE AND ACETAMINOPHEN 1 TABLET: 5; 325 TABLET ORAL at 08:22

## 2025-08-07 RX ADMIN — SENNOSIDES, DOCUSATE SODIUM 2 TABLET: 50; 8.6 TABLET, FILM COATED ORAL at 08:10

## 2025-08-07 RX ADMIN — ESCITALOPRAM OXALATE 20 MG: 20 TABLET ORAL at 08:11

## 2025-08-07 RX ADMIN — Medication 10 ML: at 08:10

## 2025-08-07 RX ADMIN — PANTOPRAZOLE SODIUM 40 MG: 40 TABLET, DELAYED RELEASE ORAL at 08:11

## 2025-08-07 RX ADMIN — FLUTICASONE PROPIONATE 1 SPRAY: 50 SPRAY, METERED NASAL at 08:12

## 2025-08-07 RX ADMIN — LISINOPRIL 5 MG: 5 TABLET ORAL at 08:10

## 2025-08-07 RX ADMIN — GABAPENTIN 300 MG: 300 CAPSULE ORAL at 08:22

## 2025-08-27 ENCOUNTER — HOSPITAL ENCOUNTER (OUTPATIENT)
Dept: GENERAL RADIOLOGY | Facility: HOSPITAL | Age: 55
Discharge: HOME | End: 2025-08-27
Admitting: NEUROLOGICAL SURGERY
Payer: COMMERCIAL

## 2025-08-27 ENCOUNTER — OFFICE VISIT (OUTPATIENT)
Dept: NEUROSURGERY | Facility: CLINIC | Age: 55
End: 2025-08-27
Payer: COMMERCIAL

## 2025-08-27 VITALS — BODY MASS INDEX: 25.54 KG/M2 | WEIGHT: 162.7 LBS | HEIGHT: 67 IN | TEMPERATURE: 98.5 F

## 2025-08-27 DIAGNOSIS — M51.362 DEGENERATION OF INTERVERTEBRAL DISC OF LUMBAR REGION WITH DISCOGENIC BACK PAIN AND LOWER EXTREMITY PAIN: Primary | ICD-10-CM

## 2025-08-27 DIAGNOSIS — M43.10 SPONDYLOLISTHESIS, ACQUIRED: ICD-10-CM

## 2025-08-27 DIAGNOSIS — Z98.1 S/P LUMBAR FUSION: ICD-10-CM

## 2025-08-27 PROCEDURE — 72100 X-RAY EXAM L-S SPINE 2/3 VWS: CPT

## 2025-08-27 RX ORDER — METHOCARBAMOL 500 MG/1
500 TABLET, FILM COATED ORAL 3 TIMES DAILY
Qty: 60 TABLET | Refills: 2 | Status: SHIPPED | OUTPATIENT
Start: 2025-08-27

## (undated) DEVICE — DISPOSABLE NEEDLE: Brand: DISPOSABLE NEEDLE

## (undated) DEVICE — DRP C/ARMOR

## (undated) DEVICE — BLANKT WARM UPPR/BDY ARM/OUT 57X196CM

## (undated) DEVICE — GLV SURG PREMIERPRO MIC LTX PF SZ6.5 BRN

## (undated) DEVICE — DRSNG SURESITE WNDW 4X4.5

## (undated) DEVICE — 3M™ IOBAN™ 2 ANTIMICROBIAL INCISE DRAPE 6650EZ: Brand: IOBAN™ 2

## (undated) DEVICE — COR CYSTO: Brand: MEDLINE INDUSTRIES, INC.

## (undated) DEVICE — GLV SURG PREMIERPRO MIC LTX PF SZ8 BRN

## (undated) DEVICE — BOWL ASSY BM210 DUAL BLADE DISPOSABLE: Brand: MIDAS REX™

## (undated) DEVICE — TRAP,MUCUS SPECIMEN,40CC: Brand: MEDLINE

## (undated) DEVICE — KT DRN EVAC WND PVC PCH WTROC RND 10F400

## (undated) DEVICE — SNAR POLYP CAPTIFLX MICRO OVL 13MM 240CM

## (undated) DEVICE — GLV SURG PREMIERPRO MIC LTX PF SZ7 BRN

## (undated) DEVICE — 1000 S-DRAPE TOWEL DRAPE 10/BX: Brand: STERI-DRAPE™

## (undated) DEVICE — GAUZE,BORDER,4"X8",2"X6"PAD,STERILE: Brand: MEDLINE

## (undated) DEVICE — SUT VIC 3/0 SH 27IN J416H

## (undated) DEVICE — SYR LUERLOK 30CC

## (undated) DEVICE — RICH MAJOR PROCEDURE: Brand: MEDLINE INDUSTRIES, INC.

## (undated) DEVICE — PACK,UNIVERSAL,NO GOWNS: Brand: MEDLINE

## (undated) DEVICE — Device

## (undated) DEVICE — ANTIBACTERIAL UNDYED BRAIDED (POLYGLACTIN 910), SYNTHETIC ABSORBABLE SUTURE: Brand: COATED VICRYL

## (undated) DEVICE — SNAP KOVER: Brand: UNBRANDED

## (undated) DEVICE — GLV SURG PREMIERPRO MIC LTX PF SZ7.5 BRN

## (undated) DEVICE — SHEET,DRAPE,40X58,STERILE: Brand: MEDLINE

## (undated) DEVICE — SKIN AFFIX SURG ADHESIVE 72/CS 0.55ML: Brand: MEDLINE

## (undated) DEVICE — SUT VIC 0 CTD BR 18IN UNDYE VCP724D

## (undated) DEVICE — SUT SILK 2/0 PS 18IN 1588H

## (undated) DEVICE — GOWN,SIRUS,POLYRNF,BRTHSLV,XL,30/CS: Brand: MEDLINE

## (undated) DEVICE — STRAP POSTN KN/BDY FM 5X72IN DISP

## (undated) DEVICE — SUT VIC PLS CTD ANTIB BR 3/0 8/18IN 45CM

## (undated) DEVICE — GLV SURG SENSICARE W/ALOE PF LF 7 STRL

## (undated) DEVICE — PATIENT RETURN ELECTRODE, SINGLE-USE, CONTACT QUALITY MONITORING, ADULT, WITH 9FT CORD, FOR PATIENTS WEIGING OVER 33LBS. (15KG): Brand: MEGADYNE

## (undated) DEVICE — ENDOGATOR AUXILIARY WATER JET CONNECTOR: Brand: ENDOGATOR

## (undated) DEVICE — FRCP BX RADJAW4 NDL 2.8 240CM LG OG BX40

## (undated) DEVICE — PK NEURO DISC 10

## (undated) DEVICE — NDL INJ CYSTO BOTOX FELX 22G 4MMX70CM

## (undated) DEVICE — Device: Brand: DEFENDO AIR/WATER/SUCTION AND BIOPSY VALVE

## (undated) DEVICE — TOOL MR8-14MH30T MR8 14CM MH SYMTRI 3MM: Brand: MIDAS REX MR8

## (undated) DEVICE — PAD ARMBRD SURG CONVOL 7.5X20X2IN

## (undated) DEVICE — CONN Y IRR DISP 1P/U

## (undated) DEVICE — CLAVICLE STRAP: Brand: DEROYAL

## (undated) DEVICE — GLV SURG SENSICARE PI LF PF 7.5 GRN STRL

## (undated) DEVICE — INTENDED FOR TISSUE SEPARATION, AND OTHER PROCEDURES THAT REQUIRE A SHARP SURGICAL BLADE TO PUNCTURE OR CUT.: Brand: BARD-PARKER ® CARBON RIB-BACK BLADES

## (undated) DEVICE — THE BITE BLOCK MAXI, LATEX FREE STRAP IS USED TO PROTECT THE ENDOSCOPE INSERTION TUBE FROM BEING BITTEN BY THE PATIENT.

## (undated) DEVICE — STRIP,CLOSURE,WOUND,MEDI-STRIP,1/2X4: Brand: MEDLINE

## (undated) DEVICE — SYR LUERLOK 5CC

## (undated) DEVICE — CONN TBG Y 5 IN 1 LF STRL

## (undated) DEVICE — SUT MNCRYL PLS ANTIB UD 4/0 PS2 18IN

## (undated) DEVICE — JACKSON TABLE POSITIONER KIT: Brand: MEDLINE INDUSTRIES, INC.

## (undated) DEVICE — IRRIGATOR BULB ASEPTO 60CC STRL

## (undated) DEVICE — SPHR MARKR STEALTH STATION